# Patient Record
Sex: MALE | Race: WHITE | NOT HISPANIC OR LATINO | URBAN - METROPOLITAN AREA
[De-identification: names, ages, dates, MRNs, and addresses within clinical notes are randomized per-mention and may not be internally consistent; named-entity substitution may affect disease eponyms.]

---

## 2022-07-01 RX ADMIN — OXYCODONE HYDROCHLORIDE 10 MILLIGRAM(S): 5 TABLET ORAL at 22:20

## 2022-07-21 ENCOUNTER — INPATIENT (INPATIENT)
Facility: HOSPITAL | Age: 57
LOS: 10 days | Discharge: HOME HEALTH | DRG: 235 | End: 2022-08-01
Attending: THORACIC SURGERY (CARDIOTHORACIC VASCULAR SURGERY) | Admitting: THORACIC SURGERY (CARDIOTHORACIC VASCULAR SURGERY)
Payer: COMMERCIAL

## 2022-07-21 ENCOUNTER — TRANSCRIPTION ENCOUNTER (OUTPATIENT)
Age: 57
End: 2022-07-21

## 2022-07-21 VITALS — WEIGHT: 245.15 LBS

## 2022-07-21 LAB
A1C WITH ESTIMATED AVERAGE GLUCOSE RESULT: 5.1 % — SIGNIFICANT CHANGE UP (ref 4–5.6)
ALBUMIN SERPL ELPH-MCNC: 4.8 G/DL — SIGNIFICANT CHANGE UP (ref 3.3–5)
ALP SERPL-CCNC: 70 U/L — SIGNIFICANT CHANGE UP (ref 40–120)
ALT FLD-CCNC: 26 U/L — SIGNIFICANT CHANGE UP (ref 10–45)
ANION GAP SERPL CALC-SCNC: 10 MMOL/L — SIGNIFICANT CHANGE UP (ref 5–17)
APTT BLD: 35.8 SEC — HIGH (ref 27.5–35.5)
APTT BLD: 58.7 SEC — HIGH (ref 27.5–35.5)
AST SERPL-CCNC: 42 U/L — HIGH (ref 10–40)
BILIRUB SERPL-MCNC: 0.9 MG/DL — SIGNIFICANT CHANGE UP (ref 0.2–1.2)
BLD GP AB SCN SERPL QL: NEGATIVE — SIGNIFICANT CHANGE UP
BLD GP AB SCN SERPL QL: NEGATIVE — SIGNIFICANT CHANGE UP
BUN SERPL-MCNC: 15 MG/DL — SIGNIFICANT CHANGE UP (ref 7–23)
CALCIUM SERPL-MCNC: 9.3 MG/DL — SIGNIFICANT CHANGE UP (ref 8.4–10.5)
CHLORIDE SERPL-SCNC: 98 MMOL/L — SIGNIFICANT CHANGE UP (ref 96–108)
CHOLEST SERPL-MCNC: 155 MG/DL — SIGNIFICANT CHANGE UP
CO2 SERPL-SCNC: 29 MMOL/L — SIGNIFICANT CHANGE UP (ref 22–31)
CREAT SERPL-MCNC: 1.07 MG/DL — SIGNIFICANT CHANGE UP (ref 0.5–1.3)
EGFR: 82 ML/MIN/1.73M2 — SIGNIFICANT CHANGE UP
ESTIMATED AVERAGE GLUCOSE: 100 MG/DL — SIGNIFICANT CHANGE UP (ref 68–114)
GLUCOSE SERPL-MCNC: 118 MG/DL — HIGH (ref 70–99)
HCT VFR BLD CALC: 41.7 % — SIGNIFICANT CHANGE UP (ref 39–50)
HDLC SERPL-MCNC: 48 MG/DL — SIGNIFICANT CHANGE UP
HGB BLD-MCNC: 14.6 G/DL — SIGNIFICANT CHANGE UP (ref 13–17)
INR BLD: 1.31 — HIGH (ref 0.88–1.16)
INR BLD: 1.33 — HIGH (ref 0.88–1.16)
LIPID PNL WITH DIRECT LDL SERPL: 89 MG/DL — SIGNIFICANT CHANGE UP
MAGNESIUM SERPL-MCNC: 2.2 MG/DL — SIGNIFICANT CHANGE UP (ref 1.6–2.6)
MCHC RBC-ENTMCNC: 28.9 PG — SIGNIFICANT CHANGE UP (ref 27–34)
MCHC RBC-ENTMCNC: 35 GM/DL — SIGNIFICANT CHANGE UP (ref 32–36)
MCV RBC AUTO: 82.6 FL — SIGNIFICANT CHANGE UP (ref 80–100)
NON HDL CHOLESTEROL: 107 MG/DL — SIGNIFICANT CHANGE UP
NRBC # BLD: 0 /100 WBCS — SIGNIFICANT CHANGE UP (ref 0–0)
NT-PROBNP SERPL-SCNC: 1165 PG/ML — HIGH (ref 0–300)
PA ADP PRP-ACNC: 205 PRU — SIGNIFICANT CHANGE UP (ref 194–418)
PLATELET # BLD AUTO: 284 K/UL — SIGNIFICANT CHANGE UP (ref 150–400)
POTASSIUM SERPL-MCNC: 4.3 MMOL/L — SIGNIFICANT CHANGE UP (ref 3.5–5.3)
POTASSIUM SERPL-SCNC: 4.3 MMOL/L — SIGNIFICANT CHANGE UP (ref 3.5–5.3)
PROT SERPL-MCNC: 7.8 G/DL — SIGNIFICANT CHANGE UP (ref 6–8.3)
PROTHROM AB SERPL-ACNC: 15.6 SEC — HIGH (ref 10.5–13.4)
PROTHROM AB SERPL-ACNC: 15.9 SEC — HIGH (ref 10.5–13.4)
RBC # BLD: 5.05 M/UL — SIGNIFICANT CHANGE UP (ref 4.2–5.8)
RBC # FLD: 14.3 % — SIGNIFICANT CHANGE UP (ref 10.3–14.5)
RH IG SCN BLD-IMP: NEGATIVE — SIGNIFICANT CHANGE UP
RH IG SCN BLD-IMP: NEGATIVE — SIGNIFICANT CHANGE UP
SODIUM SERPL-SCNC: 137 MMOL/L — SIGNIFICANT CHANGE UP (ref 135–145)
TRIGL SERPL-MCNC: 91 MG/DL — SIGNIFICANT CHANGE UP
TROPONIN T SERPL-MCNC: 2.3 NG/ML — CRITICAL HIGH (ref 0–0.01)
TSH SERPL-MCNC: 3.67 UIU/ML — SIGNIFICANT CHANGE UP (ref 0.27–4.2)
WBC # BLD: 14.12 K/UL — HIGH (ref 3.8–10.5)
WBC # FLD AUTO: 14.12 K/UL — HIGH (ref 3.8–10.5)

## 2022-07-21 PROCEDURE — 71250 CT THORAX DX C-: CPT | Mod: 26

## 2022-07-21 PROCEDURE — 71045 X-RAY EXAM CHEST 1 VIEW: CPT | Mod: 26

## 2022-07-21 PROCEDURE — 93010 ELECTROCARDIOGRAM REPORT: CPT | Mod: 77

## 2022-07-21 PROCEDURE — 93306 TTE W/DOPPLER COMPLETE: CPT | Mod: 26

## 2022-07-21 PROCEDURE — 93880 EXTRACRANIAL BILAT STUDY: CPT | Mod: 26

## 2022-07-21 PROCEDURE — 93926 LOWER EXTREMITY STUDY: CPT | Mod: 26,RT

## 2022-07-21 PROCEDURE — 99222 1ST HOSP IP/OBS MODERATE 55: CPT

## 2022-07-21 RX ORDER — ATORVASTATIN CALCIUM 80 MG/1
40 TABLET, FILM COATED ORAL AT BEDTIME
Refills: 0 | Status: DISCONTINUED | OUTPATIENT
Start: 2022-07-21 | End: 2022-08-01

## 2022-07-21 RX ORDER — HEPARIN SODIUM 5000 [USP'U]/ML
1100 INJECTION INTRAVENOUS; SUBCUTANEOUS
Qty: 25000 | Refills: 0 | Status: DISCONTINUED | OUTPATIENT
Start: 2022-07-21 | End: 2022-07-21

## 2022-07-21 RX ORDER — ASPIRIN/CALCIUM CARB/MAGNESIUM 324 MG
81 TABLET ORAL DAILY
Refills: 0 | Status: DISCONTINUED | OUTPATIENT
Start: 2022-07-21 | End: 2022-08-01

## 2022-07-21 RX ORDER — POLYETHYLENE GLYCOL 3350 17 G/17G
17 POWDER, FOR SOLUTION ORAL DAILY
Refills: 0 | Status: DISCONTINUED | OUTPATIENT
Start: 2022-07-21 | End: 2022-07-25

## 2022-07-21 RX ORDER — ACETAMINOPHEN 500 MG
650 TABLET ORAL EVERY 6 HOURS
Refills: 0 | Status: DISCONTINUED | OUTPATIENT
Start: 2022-07-21 | End: 2022-07-25

## 2022-07-21 RX ORDER — HEPARIN SODIUM 5000 [USP'U]/ML
5000 INJECTION INTRAVENOUS; SUBCUTANEOUS EVERY 8 HOURS
Refills: 0 | Status: DISCONTINUED | OUTPATIENT
Start: 2022-07-21 | End: 2022-07-21

## 2022-07-21 RX ORDER — PANTOPRAZOLE SODIUM 20 MG/1
40 TABLET, DELAYED RELEASE ORAL
Refills: 0 | Status: DISCONTINUED | OUTPATIENT
Start: 2022-07-21 | End: 2022-07-25

## 2022-07-21 RX ORDER — SODIUM CHLORIDE 9 MG/ML
3 INJECTION INTRAMUSCULAR; INTRAVENOUS; SUBCUTANEOUS EVERY 8 HOURS
Refills: 0 | Status: DISCONTINUED | OUTPATIENT
Start: 2022-07-21 | End: 2022-07-25

## 2022-07-21 RX ORDER — METOPROLOL TARTRATE 50 MG
12.5 TABLET ORAL EVERY 12 HOURS
Refills: 0 | Status: DISCONTINUED | OUTPATIENT
Start: 2022-07-21 | End: 2022-07-25

## 2022-07-21 RX ORDER — ISOSORBIDE MONONITRATE 60 MG/1
30 TABLET, EXTENDED RELEASE ORAL DAILY
Refills: 0 | Status: DISCONTINUED | OUTPATIENT
Start: 2022-07-22 | End: 2022-07-25

## 2022-07-21 RX ORDER — ENOXAPARIN SODIUM 100 MG/ML
110 INJECTION SUBCUTANEOUS EVERY 12 HOURS
Refills: 0 | Status: DISCONTINUED | OUTPATIENT
Start: 2022-07-21 | End: 2022-07-21

## 2022-07-21 RX ADMIN — ENOXAPARIN SODIUM 110 MILLIGRAM(S): 100 INJECTION SUBCUTANEOUS at 17:47

## 2022-07-21 RX ADMIN — SODIUM CHLORIDE 3 MILLILITER(S): 9 INJECTION INTRAMUSCULAR; INTRAVENOUS; SUBCUTANEOUS at 21:50

## 2022-07-21 RX ADMIN — PANTOPRAZOLE SODIUM 40 MILLIGRAM(S): 20 TABLET, DELAYED RELEASE ORAL at 13:19

## 2022-07-21 RX ADMIN — POLYETHYLENE GLYCOL 3350 17 GRAM(S): 17 POWDER, FOR SOLUTION ORAL at 13:19

## 2022-07-21 RX ADMIN — Medication 81 MILLIGRAM(S): at 13:19

## 2022-07-21 RX ADMIN — Medication 12.5 MILLIGRAM(S): at 13:19

## 2022-07-21 RX ADMIN — SODIUM CHLORIDE 3 MILLILITER(S): 9 INJECTION INTRAMUSCULAR; INTRAVENOUS; SUBCUTANEOUS at 13:12

## 2022-07-21 RX ADMIN — HEPARIN SODIUM 11 UNIT(S)/HR: 5000 INJECTION INTRAVENOUS; SUBCUTANEOUS at 13:20

## 2022-07-21 RX ADMIN — ATORVASTATIN CALCIUM 40 MILLIGRAM(S): 80 TABLET, FILM COATED ORAL at 21:48

## 2022-07-21 RX ADMIN — Medication 12.5 MILLIGRAM(S): at 17:47

## 2022-07-21 NOTE — H&P ADULT - NSHPSOCIALHISTORY_GEN_ALL_CORE
Social History  Smoker:   YES / NO       Pack Years:       When Quit:  ETOH Use:   YES / NO   Frequency / Quantity:  Ilicit Drug Use:   YES / NO  Occupation:  Assistant Devices:   None / Walker / Cane  Lives with: Social History  Smoker:   NO  ETOH Use:   occasional, social   Ilicit Drug Use:  NO  Occupation: retired    Assistant Devices:   None  Lives with: wife and kids

## 2022-07-21 NOTE — CONSULT NOTE ADULT - ASSESSMENT
56 y/o M transferred for 3-vessel CABG next week after cardiac cath and IABP placement via R groin now with R groin hematoma and pseudoaneurysm    - Ultrasound guided compression performed at bedside for 30 minutes.  - The pseudoaneurysm was mainly without flow at the conclusion of compression with some small areas of residual flow  - Strict bedrest for until the am, must lay flat  - repeat U/S of R groin first thing in the am  - NPO @ [facility]  - May need thrombin injection tomorrow if residual pseudoaneurysm seen  - CT surgery ok with holding anticoagulation for now 58 y/o M transferred for 3-vessel CABG next week after cardiac cath and IABP placement via R groin now with R groin hematoma and pseudoaneurysm    - Ultrasound guided compression performed at bedside for 30 minutes.  - The pseudoaneurysm was mainly without flow at the conclusion of compression with some small areas of residual flow  - Strict bedrest for until the am, must lay flat  - f/u CT scan  - repeat U/S of R groin first thing in the am  - NPO @ [facility]  - May need thrombin injection tomorrow if residual pseudoaneurysm seen  - CT surgery ok with holding anticoagulation for now

## 2022-07-21 NOTE — PATIENT PROFILE ADULT - FALL HARM RISK - UNIVERSAL INTERVENTIONS
Bed in lowest position, wheels locked, appropriate side rails in place/Call bell, personal items and telephone in reach/Instruct patient to call for assistance before getting out of bed or chair/Non-slip footwear when patient is out of bed/[place] to call system/Physically safe environment - no spills, clutter or unnecessary equipment/Purposeful Proactive Rounding/Room/bathroom lighting operational, light cord in reach

## 2022-07-21 NOTE — H&P ADULT - HISTORY OF PRESENT ILLNESS
57 y.o M with no significant PMHx, recent COVID infection 3 weeks ago, who initially presented to [facility] ED on 7/19 with exertional chest pain X1 week, relieved with rest. In the ED patient noted to have elevated troponin and was ruled in for NSTEMI. He was given aspirin and brilinta and taken to the cath lab. Cath revealed 3VCAD: LAD 90%, D1 80%, LCx 90%, OM1 100% thrombotic occlusion with collaterals from RCA, RPDA 80%. IABP was placed and patient was brought to the ICU post cath. IABP was removed the next day and decision made to transfer to [facility] under the care of Dr. Bran for surgical evaluation.

## 2022-07-21 NOTE — H&P ADULT - NSHPREVIEWOFSYSTEMS_GEN_ALL_CORE
Review of Systems  CONSTITUTIONAL:  Denies Fevers / chills, sweats, fatigue, weight loss, weight gain                                      NEURO:  Denies paresthesias, seizures, syncope, confusion                                                                                EYES:  Denies Blurry vision, discharge, pain, loss of vision                                                                                    ENMT:  Denies Difficulty hearing, vertigo, dysphagia, epistaxis, recent dental work                                       CV:  Denies Chest pain, palpitations, BEYER, orthopnea                                                                                          RESPIRATORY:  Denies Wheezing, SOB, cough / sputum, hemoptysis                                                                GI:  Denies Nausea, vomiting, diarrhea, constipation, melena, difficulty swallowing                                               : Denies Hematuria, dysuria, urgency, incontinence                                                                                         MUSKULOSKELETAL:  Denies arthritis, joint swelling, muscle weakness                                                             SKIN/BREAST:  Denies rash, itching, hair loss, masses                                                                                            PSYCH:  Denies depression, anxiety, suicidal ideation                                                                                               HEME/LYMPH:  Denies bruises easily, enlarged lymph nodes, tender lymph nodes                                        ENDOCRINE:  Denies cold intolerance, heat intolerance, polydipsia

## 2022-07-21 NOTE — CONSULT NOTE ADULT - ATTENDING COMMENTS
Patient with right groin pseudoaneurysm following IABP.  Will proceed with thrombin injection today.  Patient agreeable to proceed.

## 2022-07-21 NOTE — H&P ADULT - NSHPPHYSICALEXAM_GEN_ALL_CORE
Physical Exam  CONSTITUTIONAL:                                                              WNL  NEURO:                                                                       WNL                      EYES:                                                                                WNL  ENMT:                                                                               WNL  CV:                                                                                   WNL  RESPIRATORY:                                                                 WNL  GI:                                                                                     WNL  : MCNAMARA + / -                                                                  WNL  MUSKULOSKELETAL:                                                       WNL  SKIN / BREAST:                                                                  WNL Physical Exam  CONSTITUTIONAL: well appearing, NAD  NEURO: A&OX3, no focal deficits noted                     EYES: PERRLA   ENMT: neck supple    CV: RRR, no m/r/g   RESPIRATORY:  CTA bilateral posterior lung fields, no wheezes, rales, rhonchi   GI: +BS, NT/ND  : No johnson   MUSKULOSKELETAL: No peripheral edema or calf tenderness   SKIN / BREAST: No rashes noted

## 2022-07-22 PROBLEM — Z00.00 ENCOUNTER FOR PREVENTIVE HEALTH EXAMINATION: Status: ACTIVE | Noted: 2022-07-22

## 2022-07-22 LAB
ANION GAP SERPL CALC-SCNC: 11 MMOL/L — SIGNIFICANT CHANGE UP (ref 5–17)
APTT BLD: 30.7 SEC — SIGNIFICANT CHANGE UP (ref 27.5–35.5)
BUN SERPL-MCNC: 15 MG/DL — SIGNIFICANT CHANGE UP (ref 7–23)
CALCIUM SERPL-MCNC: 8.8 MG/DL — SIGNIFICANT CHANGE UP (ref 8.4–10.5)
CHLORIDE SERPL-SCNC: 101 MMOL/L — SIGNIFICANT CHANGE UP (ref 96–108)
CO2 SERPL-SCNC: 26 MMOL/L — SIGNIFICANT CHANGE UP (ref 22–31)
CREAT SERPL-MCNC: 1.1 MG/DL — SIGNIFICANT CHANGE UP (ref 0.5–1.3)
EGFR: 79 ML/MIN/1.73M2 — SIGNIFICANT CHANGE UP
GLUCOSE SERPL-MCNC: 100 MG/DL — HIGH (ref 70–99)
HCT VFR BLD CALC: 39.1 % — SIGNIFICANT CHANGE UP (ref 39–50)
HCT VFR BLD CALC: 39.2 % — SIGNIFICANT CHANGE UP (ref 39–50)
HGB BLD-MCNC: 13.5 G/DL — SIGNIFICANT CHANGE UP (ref 13–17)
HGB BLD-MCNC: 13.5 G/DL — SIGNIFICANT CHANGE UP (ref 13–17)
INR BLD: 1.29 — HIGH (ref 0.88–1.16)
MAGNESIUM SERPL-MCNC: 2.1 MG/DL — SIGNIFICANT CHANGE UP (ref 1.6–2.6)
MCHC RBC-ENTMCNC: 28.5 PG — SIGNIFICANT CHANGE UP (ref 27–34)
MCHC RBC-ENTMCNC: 28.8 PG — SIGNIFICANT CHANGE UP (ref 27–34)
MCHC RBC-ENTMCNC: 34.4 GM/DL — SIGNIFICANT CHANGE UP (ref 32–36)
MCHC RBC-ENTMCNC: 34.5 GM/DL — SIGNIFICANT CHANGE UP (ref 32–36)
MCV RBC AUTO: 82.9 FL — SIGNIFICANT CHANGE UP (ref 80–100)
MCV RBC AUTO: 83.4 FL — SIGNIFICANT CHANGE UP (ref 80–100)
NRBC # BLD: 0 /100 WBCS — SIGNIFICANT CHANGE UP (ref 0–0)
NRBC # BLD: 0 /100 WBCS — SIGNIFICANT CHANGE UP (ref 0–0)
PA ADP PRP-ACNC: 222 PRU — SIGNIFICANT CHANGE UP (ref 194–418)
PLATELET # BLD AUTO: 226 K/UL — SIGNIFICANT CHANGE UP (ref 150–400)
PLATELET # BLD AUTO: 295 K/UL — SIGNIFICANT CHANGE UP (ref 150–400)
POTASSIUM SERPL-MCNC: 4.1 MMOL/L — SIGNIFICANT CHANGE UP (ref 3.5–5.3)
POTASSIUM SERPL-SCNC: 4.1 MMOL/L — SIGNIFICANT CHANGE UP (ref 3.5–5.3)
PROTHROM AB SERPL-ACNC: 15.4 SEC — HIGH (ref 10.5–13.4)
RBC # BLD: 4.69 M/UL — SIGNIFICANT CHANGE UP (ref 4.2–5.8)
RBC # BLD: 4.73 M/UL — SIGNIFICANT CHANGE UP (ref 4.2–5.8)
RBC # FLD: 14 % — SIGNIFICANT CHANGE UP (ref 10.3–14.5)
RBC # FLD: 14.1 % — SIGNIFICANT CHANGE UP (ref 10.3–14.5)
SODIUM SERPL-SCNC: 138 MMOL/L — SIGNIFICANT CHANGE UP (ref 135–145)
WBC # BLD: 11.55 K/UL — HIGH (ref 3.8–10.5)
WBC # BLD: 13.01 K/UL — HIGH (ref 3.8–10.5)
WBC # FLD AUTO: 11.55 K/UL — HIGH (ref 3.8–10.5)
WBC # FLD AUTO: 13.01 K/UL — HIGH (ref 3.8–10.5)

## 2022-07-22 PROCEDURE — 36002 PSEUDOANEURYSM INJECTION TRT: CPT | Mod: GC

## 2022-07-22 PROCEDURE — 94010 BREATHING CAPACITY TEST: CPT | Mod: 26

## 2022-07-22 RX ORDER — ENOXAPARIN SODIUM 100 MG/ML
110 INJECTION SUBCUTANEOUS EVERY 12 HOURS
Refills: 0 | Status: DISCONTINUED | OUTPATIENT
Start: 2022-07-22 | End: 2022-07-23

## 2022-07-22 RX ORDER — POTASSIUM CHLORIDE 20 MEQ
10 PACKET (EA) ORAL ONCE
Refills: 0 | Status: COMPLETED | OUTPATIENT
Start: 2022-07-22 | End: 2022-07-22

## 2022-07-22 RX ADMIN — SODIUM CHLORIDE 3 MILLILITER(S): 9 INJECTION INTRAMUSCULAR; INTRAVENOUS; SUBCUTANEOUS at 22:07

## 2022-07-22 RX ADMIN — SODIUM CHLORIDE 3 MILLILITER(S): 9 INJECTION INTRAMUSCULAR; INTRAVENOUS; SUBCUTANEOUS at 06:20

## 2022-07-22 RX ADMIN — ISOSORBIDE MONONITRATE 30 MILLIGRAM(S): 60 TABLET, EXTENDED RELEASE ORAL at 11:17

## 2022-07-22 RX ADMIN — Medication 81 MILLIGRAM(S): at 11:17

## 2022-07-22 RX ADMIN — ENOXAPARIN SODIUM 110 MILLIGRAM(S): 100 INJECTION SUBCUTANEOUS at 11:44

## 2022-07-22 RX ADMIN — ATORVASTATIN CALCIUM 40 MILLIGRAM(S): 80 TABLET, FILM COATED ORAL at 22:06

## 2022-07-22 RX ADMIN — SODIUM CHLORIDE 3 MILLILITER(S): 9 INJECTION INTRAMUSCULAR; INTRAVENOUS; SUBCUTANEOUS at 13:45

## 2022-07-22 RX ADMIN — POLYETHYLENE GLYCOL 3350 17 GRAM(S): 17 POWDER, FOR SOLUTION ORAL at 11:17

## 2022-07-22 RX ADMIN — Medication 10 MILLIEQUIVALENT(S): at 09:40

## 2022-07-22 RX ADMIN — ENOXAPARIN SODIUM 110 MILLIGRAM(S): 100 INJECTION SUBCUTANEOUS at 22:06

## 2022-07-22 RX ADMIN — PANTOPRAZOLE SODIUM 40 MILLIGRAM(S): 20 TABLET, DELAYED RELEASE ORAL at 06:18

## 2022-07-22 RX ADMIN — Medication 12.5 MILLIGRAM(S): at 06:18

## 2022-07-22 RX ADMIN — Medication 12.5 MILLIGRAM(S): at 17:05

## 2022-07-22 NOTE — PROGRESS NOTE ADULT - ASSESSMENT
57 y.o M with no significant PMHx, recent COVID infection 3 weeks ago, who initially presented to [facility] ED on 7/19 with exertional chest pain X1 week, relieved with rest. In the ED patient noted to have elevated troponin and was ruled in for NSTEMI. He was given aspirin and brilinta and taken to the cath lab. Cath revealed 3VCAD: LAD 90%, D1 80%, LCx 90%, OM1 100% thrombotic occlusion with collaterals from RCA, RPDA 80%. IABP was placed and patient was brought to the ICU post cath. IABP was removed the next day and decision made to transfer to [facility] under the care of Dr. Bran for surgical evaluation.     Plan:  Problem 1: 3VCAD  - Patient transferred for surgical evaluation, pending pre op testing: labs, carotid U/S, TTE, bedside PFTs  - F/u P2Y12, per chart given brillinta at [facility]  - Continue heparin gtt, BB, atorvastatin, ASA     Problem 2: pseudoaneurysm  - vascular surgery consulted   - injected right groin with thrombin today  - repeat formal u/s tomorrow to evaluate changes

## 2022-07-22 NOTE — PROGRESS NOTE ADULT - SUBJECTIVE AND OBJECTIVE BOX
Patient discussed on morning rounds with Dr. Bran    Operation / Date: preop CABG    SUBJECTIVE ASSESSMENT:  57y Male . NAEO. Pseudoaneurysm being injected by vascular surgery this morning. Patient to remain lying flat 4 hours post procedure and then bed rest all day. PAtient denies cp, sob, palpitaitons, n/v/d/c.    Vital Signs Last 24 Hrs  T(C): 36.2 (22 Jul 2022 09:00), Max: 36.6 (21 Jul 2022 20:33)  T(F): 97.1 (22 Jul 2022 09:00), Max: 97.8 (21 Jul 2022 20:33)  HR: 80 (22 Jul 2022 16:00) (64 - 80)  BP: 124/71 (22 Jul 2022 16:00) (111/57 - 131/66)  BP(mean): 89 (22 Jul 2022 16:00) (75 - 94)  RR: 18 (22 Jul 2022 16:00) (16 - 18)  SpO2: 96% (22 Jul 2022 16:00) (95% - 97%)    Parameters below as of 22 Jul 2022 16:00  Patient On (Oxygen Delivery Method): room air      I&O's Detail    21 Jul 2022 07:01  -  22 Jul 2022 07:00  --------------------------------------------------------  IN:    Oral Fluid: 120 mL  Total IN: 120 mL    OUT:    Voided (mL): 400 mL  Total OUT: 400 mL    Total NET: -280 mL    CHEST TUBE:   No.   ZEENAT DRAIN:  No.  EPICARDIAL WIRES: No.  TIE DOWNS: No.  MCNAMARA: No.    PHYSICAL EXAM:  GEN: NAD, looks comfortable  Psych: Mood appropriate  Neuro: A&Ox3.  No focal deficits.  Moving all extremities.   HEENT: No obvious abnormalities  CV: S1S2, regular, no murmurs appreciated.  No carotid bruits.  No JVD  Lungs: Clear B/L.  No wheezing, rales or rhonchi  ABD: Soft, non-tender, non-distended.  +Bowel sounds  EXT: Warm and well perfused.  No peripheral edema noted  Musculoskeletal: Moving all extremities with normal ROM, no joint swelling  PV: Pedal pulses palpable    LABS:                        13.5   11.55 )-----------( 226      ( 22 Jul 2022 05:30 )             39.1       COUMADIN: No.     PT/INR - ( 22 Jul 2022 05:30 )   PT: 15.4 sec;   INR: 1.29          PTT - ( 22 Jul 2022 05:30 )  PTT:30.7 sec    07-22    138  |  101  |  15  ----------------------------<  100<H>  4.1   |  26  |  1.10    Ca    8.8      22 Jul 2022 05:30  Mg     2.1     07-22    TPro  7.8  /  Alb  4.8  /  TBili  0.9  /  DBili  x   /  AST  42<H>  /  ALT  26  /  AlkPhos  70  07-21    MEDICATIONS  (STANDING):  aspirin  chewable 81 milliGRAM(s) Oral daily  atorvastatin 40 milliGRAM(s) Oral at bedtime  enoxaparin Injectable 110 milliGRAM(s) SubCutaneous every 12 hours  isosorbide   mononitrate ER Tablet (IMDUR) 30 milliGRAM(s) Oral daily  metoprolol tartrate 12.5 milliGRAM(s) Oral every 12 hours  pantoprazole    Tablet 40 milliGRAM(s) Oral before breakfast  polyethylene glycol 3350 17 Gram(s) Oral daily  sodium chloride 0.9% lock flush 3 milliLiter(s) IV Push every 8 hours    MEDICATIONS  (PRN):  acetaminophen     Tablet .. 650 milliGRAM(s) Oral every 6 hours PRN Mild Pain (1 - 3)      RADIOLOGY & ADDITIONAL TESTS:  < from: US Duplex Carotid Arteries Complete, Bilateral (07.21.22 @ 18:37) >  Mild partially calcified plaque right proximal internal carotid artery.   Moderate amount of partially calcified plaqueleft proximal internal   carotid artery.    < end of copied text >    < from: VA Duplex Low Ext Arterial, Ltd, Right (07.21.22 @ 17:58) >  FINDINGS:  There is a right common femoral artery pseudoaneurysm with swirling blood   flow measuring 2.9 x 1.7 x 3.0 cm. Surrounding hematoma measures 3.5 x   1.9 x 4.1 cm. The pseudoaneurysm neck measures 1.1 cm in length and 0.4   cm in width. No deep vein thrombosis of the right thigh.    IMPRESSION:  1.  Right groin pseudoaneurysm as detailed above.  2.  No right thigh deep vein thrombosis.    < end of copied text >

## 2022-07-22 NOTE — PROCEDURE NOTE - ADDITIONAL PROCEDURE DETAILS
Please have patient lie flat for 4 hours and remain on bedrest  Will repeat duplex ultrasound tomorrow morning to evaluate pseudoaneurysm

## 2022-07-22 NOTE — PROCEDURE NOTE - GENERAL PROCEDURE DETAILS
5000 u thrombin injected into R femoral pseudoaneurysm using ultrasound guidance. patient tolerated procedure well. 30 minutes of compression held afterwards. There was no flow in the pseudoaneurysm sac on completion of procedure and patient had a palpable dp and pt

## 2022-07-23 LAB
ALBUMIN SERPL ELPH-MCNC: 3.9 G/DL — SIGNIFICANT CHANGE UP (ref 3.3–5)
ALP SERPL-CCNC: 67 U/L — SIGNIFICANT CHANGE UP (ref 40–120)
ALT FLD-CCNC: 19 U/L — SIGNIFICANT CHANGE UP (ref 10–45)
ANION GAP SERPL CALC-SCNC: 11 MMOL/L — SIGNIFICANT CHANGE UP (ref 5–17)
APPEARANCE UR: CLEAR — SIGNIFICANT CHANGE UP
APTT BLD: 35.8 SEC — HIGH (ref 27.5–35.5)
AST SERPL-CCNC: 19 U/L — SIGNIFICANT CHANGE UP (ref 10–40)
BILIRUB SERPL-MCNC: 0.7 MG/DL — SIGNIFICANT CHANGE UP (ref 0.2–1.2)
BILIRUB UR-MCNC: NEGATIVE — SIGNIFICANT CHANGE UP
BUN SERPL-MCNC: 17 MG/DL — SIGNIFICANT CHANGE UP (ref 7–23)
CALCIUM SERPL-MCNC: 9.2 MG/DL — SIGNIFICANT CHANGE UP (ref 8.4–10.5)
CHLORIDE SERPL-SCNC: 101 MMOL/L — SIGNIFICANT CHANGE UP (ref 96–108)
CO2 SERPL-SCNC: 26 MMOL/L — SIGNIFICANT CHANGE UP (ref 22–31)
COLOR SPEC: YELLOW — SIGNIFICANT CHANGE UP
CREAT SERPL-MCNC: 1.05 MG/DL — SIGNIFICANT CHANGE UP (ref 0.5–1.3)
DIFF PNL FLD: NEGATIVE — SIGNIFICANT CHANGE UP
EGFR: 84 ML/MIN/1.73M2 — SIGNIFICANT CHANGE UP
GLUCOSE SERPL-MCNC: 109 MG/DL — HIGH (ref 70–99)
GLUCOSE UR QL: NEGATIVE — SIGNIFICANT CHANGE UP
HCT VFR BLD CALC: 37.5 % — LOW (ref 39–50)
HGB BLD-MCNC: 12.8 G/DL — LOW (ref 13–17)
KETONES UR-MCNC: NEGATIVE — SIGNIFICANT CHANGE UP
LEUKOCYTE ESTERASE UR-ACNC: NEGATIVE — SIGNIFICANT CHANGE UP
MAGNESIUM SERPL-MCNC: 2.2 MG/DL — SIGNIFICANT CHANGE UP (ref 1.6–2.6)
MCHC RBC-ENTMCNC: 28.7 PG — SIGNIFICANT CHANGE UP (ref 27–34)
MCHC RBC-ENTMCNC: 34.1 GM/DL — SIGNIFICANT CHANGE UP (ref 32–36)
MCV RBC AUTO: 84.1 FL — SIGNIFICANT CHANGE UP (ref 80–100)
NITRITE UR-MCNC: NEGATIVE — SIGNIFICANT CHANGE UP
NRBC # BLD: 0 /100 WBCS — SIGNIFICANT CHANGE UP (ref 0–0)
PH UR: 5.5 — SIGNIFICANT CHANGE UP (ref 5–8)
PLATELET # BLD AUTO: 260 K/UL — SIGNIFICANT CHANGE UP (ref 150–400)
POTASSIUM SERPL-MCNC: 4.2 MMOL/L — SIGNIFICANT CHANGE UP (ref 3.5–5.3)
POTASSIUM SERPL-SCNC: 4.2 MMOL/L — SIGNIFICANT CHANGE UP (ref 3.5–5.3)
PROT SERPL-MCNC: 6.8 G/DL — SIGNIFICANT CHANGE UP (ref 6–8.3)
PROT UR-MCNC: NEGATIVE MG/DL — SIGNIFICANT CHANGE UP
RBC # BLD: 4.46 M/UL — SIGNIFICANT CHANGE UP (ref 4.2–5.8)
RBC # FLD: 14.1 % — SIGNIFICANT CHANGE UP (ref 10.3–14.5)
SODIUM SERPL-SCNC: 138 MMOL/L — SIGNIFICANT CHANGE UP (ref 135–145)
SP GR SPEC: 1.02 — SIGNIFICANT CHANGE UP (ref 1–1.03)
UROBILINOGEN FLD QL: 0.2 E.U./DL — SIGNIFICANT CHANGE UP
WBC # BLD: 10.25 K/UL — SIGNIFICANT CHANGE UP (ref 3.8–10.5)
WBC # FLD AUTO: 10.25 K/UL — SIGNIFICANT CHANGE UP (ref 3.8–10.5)

## 2022-07-23 PROCEDURE — 99232 SBSQ HOSP IP/OBS MODERATE 35: CPT

## 2022-07-23 PROCEDURE — 93926 LOWER EXTREMITY STUDY: CPT | Mod: 26,RT

## 2022-07-23 PROCEDURE — 71045 X-RAY EXAM CHEST 1 VIEW: CPT | Mod: 26

## 2022-07-23 RX ORDER — HEPARIN SODIUM 5000 [USP'U]/ML
1200 INJECTION INTRAVENOUS; SUBCUTANEOUS
Qty: 25000 | Refills: 0 | Status: DISCONTINUED | OUTPATIENT
Start: 2022-07-23 | End: 2022-07-24

## 2022-07-23 RX ORDER — ENOXAPARIN SODIUM 100 MG/ML
110 INJECTION SUBCUTANEOUS ONCE
Refills: 0 | Status: DISCONTINUED | OUTPATIENT
Start: 2022-07-23 | End: 2022-07-23

## 2022-07-23 RX ADMIN — PANTOPRAZOLE SODIUM 40 MILLIGRAM(S): 20 TABLET, DELAYED RELEASE ORAL at 06:09

## 2022-07-23 RX ADMIN — ATORVASTATIN CALCIUM 40 MILLIGRAM(S): 80 TABLET, FILM COATED ORAL at 22:26

## 2022-07-23 RX ADMIN — HEPARIN SODIUM 12 UNIT(S)/HR: 5000 INJECTION INTRAVENOUS; SUBCUTANEOUS at 19:09

## 2022-07-23 RX ADMIN — POLYETHYLENE GLYCOL 3350 17 GRAM(S): 17 POWDER, FOR SOLUTION ORAL at 11:07

## 2022-07-23 RX ADMIN — Medication 81 MILLIGRAM(S): at 11:06

## 2022-07-23 RX ADMIN — Medication 12.5 MILLIGRAM(S): at 17:08

## 2022-07-23 RX ADMIN — SODIUM CHLORIDE 3 MILLILITER(S): 9 INJECTION INTRAMUSCULAR; INTRAVENOUS; SUBCUTANEOUS at 13:17

## 2022-07-23 RX ADMIN — SODIUM CHLORIDE 3 MILLILITER(S): 9 INJECTION INTRAMUSCULAR; INTRAVENOUS; SUBCUTANEOUS at 21:53

## 2022-07-23 RX ADMIN — ISOSORBIDE MONONITRATE 30 MILLIGRAM(S): 60 TABLET, EXTENDED RELEASE ORAL at 11:06

## 2022-07-23 RX ADMIN — Medication 12.5 MILLIGRAM(S): at 06:10

## 2022-07-23 RX ADMIN — ENOXAPARIN SODIUM 110 MILLIGRAM(S): 100 INJECTION SUBCUTANEOUS at 10:56

## 2022-07-23 NOTE — PROGRESS NOTE ADULT - ASSESSMENT
57 y.o M with no significant PMHx, recent COVID infection 3 weeks ago, who initially presented to [facility] ED on 7/19 with exertional chest pain X1 week, relieved with rest. In the ED patient noted to have elevated troponin and was ruled in for NSTEMI. He was given aspirin and brilinta and taken to the cath lab. Cath revealed 3VCAD: LAD 90%, D1 80%, LCx 90%, OM1 100% thrombotic occlusion with collaterals from RCA, RPDA 80%. IABP was placed and patient was brought to the ICU post cath. IABP was removed the next day and decision made to transfer to [facility] under the care of Dr. Bran for surgical evaluation.     Plan:  Problem 1: 3VCAD  - Patient transferred for surgical evaluation, pending pre op testing: labs, carotid U/S, TTE, bedside PFTs  - F/u P2Y12, per chart given brillinta at [facility]  - Restarted heparin gtt, BB, atorvastatin, ASA     Problem 2: pseudoaneurysm  - vascular surgery consulted   - injected right groin with thrombin 7/22  - repeat formal u/s today to evaluate changes - results above  - vascular held u/s pressure on right groin; will repeat u/s tomorrow

## 2022-07-23 NOTE — PROGRESS NOTE ADULT - SUBJECTIVE AND OBJECTIVE BOX
S: Patient perceived an increase in the size of his groin hematoma overnight after thrombin injection  yesterday. [facility] called to bedside for pressure. Planning for CABG Monday.     O:     Vital Signs Last 24 Hrs  T(C): 36.2 (23 Jul 2022 21:15), Max: 36.9 (23 Jul 2022 16:50)  T(F): 97.2 (23 Jul 2022 21:15), Max: 98.4 (23 Jul 2022 16:50)  HR: 77 (23 Jul 2022 20:36) (71 - 80)  BP: 120/77 (23 Jul 2022 20:36) (118/65 - 148/76)  BP(mean): 94 (23 Jul 2022 20:36) (84 - 105)  RR: 15 (23 Jul 2022 20:36) (13 - 19)  SpO2: 97% (23 Jul 2022 20:36) (94% - 97%)    LABS:                        12.8   10.25 )-----------( 260      ( 23 Jul 2022 06:55 )             37.5     07-23    138  |  101  |  17  ----------------------------<  109<H>  4.2   |  26  |  1.05    Ca    9.2      23 Jul 2022 06:55  Mg     2.2     07-23    TPro  6.8  /  Alb  3.9  /  TBili  0.7  /  DBili  x   /  AST  19  /  ALT  19  /  AlkPhos  67  07-23    PT/INR - ( 22 Jul 2022 05:30 )   PT: 15.4 sec;   INR: 1.29          PTT - ( 23 Jul 2022 17:26 )  PTT:35.8 sec    Radiology and additional studies    FINDINGS:  There is again a right common femoral artery pseudoaneurysm with near   complete thrombosis. It measures 4.6 x 2.1 x 3.3 cm and probably   unchanged in size from prior imaging. Continued evidence of a   vascularized neck which may be slightly smaller from prior imaging   measuring approximately 0.7 cm. No deep vein thrombosis of the right   thigh.      IMPRESSION:    1. Right groin pseudoaneurysm with near complete thrombosis as detailed   above. Imaging surveillance to confirm resolution as clinically indicated.  2. No right thigh DVT.    Dr. Albarado discussed the above findings with SHARON Tyler on 7/23/2022 at 2:10   PM.    A/P: 58 y/o M w/ CAD s/p recent MI pending CABG Monday w/ R groin PSA following right groin access for IABP, now s/p thrombin injection with near complete thrombosis of pseudoaneurysm albeit with persistence of vascularized neck.     - Pressure applied to neck of pseudoaneurysm with ultrasound image guidance today. Please obtain repeat venous duplex tomorrow to reevaluate persistence of flow in and around pseudoaneurysm following this maneuver.   - Discussed this request with CT surgery PA on call.  - Please page with any questions or concerns.

## 2022-07-23 NOTE — PROGRESS NOTE ADULT - SUBJECTIVE AND OBJECTIVE BOX
Patient discussed on morning rounds with Dr. Bonilla     Operation / Date: PST CABG    SUBJECTIVE ASSESSMENT:  57y Male. NAEO. Patient with increase in right groin hematoma site overnight, improved after pressure was held. Patient otherwise denies cp, sob, palpitaions, n/v/d/c.    Vital Signs Last 24 Hrs  T(C): 36.9 (23 Jul 2022 16:50), Max: 36.9 (23 Jul 2022 16:50)  T(F): 98.4 (23 Jul 2022 16:50), Max: 98.4 (23 Jul 2022 16:50)  HR: 80 (23 Jul 2022 17:11) (71 - 82)  BP: 130/66 (23 Jul 2022 17:11) (118/65 - 148/76)  BP(mean): 90 (23 Jul 2022 17:11) (84 - 105)  RR: 13 (23 Jul 2022 17:11) (13 - 19)  SpO2: 97% (23 Jul 2022 17:11) (94% - 97%)    Parameters below as of 23 Jul 2022 17:11  Patient On (Oxygen Delivery Method): room air      I&O's Detail    22 Jul 2022 07:01  -  23 Jul 2022 07:00  --------------------------------------------------------  IN:    Oral Fluid: 240 mL  Total IN: 240 mL    OUT:    Voided (mL): 400 mL  Total OUT: 400 mL    Total NET: -160 mL      23 Jul 2022 07:01  -  23 Jul 2022 18:33  --------------------------------------------------------  IN:    Oral Fluid: 240 mL  Total IN: 240 mL    OUT:    Voided (mL): 600 mL  Total OUT: 600 mL    Total NET: -360 mL    CHEST TUBE:  No   ZEENAT DRAIN:  No  EPICARDIAL WIRES: No  TIE DOWNS: No  MCNAMARA: No    PHYSICAL EXAM:  GEN: NAD, looks comfortable  Psych: Mood appropriate  Neuro: A&Ox3.  No focal deficits.  Moving all extremities.   HEENT: No obvious abnormalities  CV: S1S2, regular, no murmurs appreciated.  No carotid bruits.  No JVD  Lungs: Clear B/L.  No wheezing, rales or rhonchi  ABD: Soft, non-tender, non-distended.  +Bowel sounds  EXT: +significant right groin hematoma; Warm and well perfused.  No peripheral edema noted  Musculoskeletal: Moving all extremities with normal ROM, no joint swelling  PV: Pedal pulses palpable      LABS:                        12.8   10.25 )-----------( 260      ( 23 Jul 2022 06:55 )             37.5       COUMADIN: No.     PT/INR - ( 22 Jul 2022 05:30 )   PT: 15.4 sec;   INR: 1.29          PTT - ( 23 Jul 2022 17:26 )  PTT:35.8 sec    07-23    138  |  101  |  17  ----------------------------<  109<H>  4.2   |  26  |  1.05    Ca    9.2      23 Jul 2022 06:55  Mg     2.2     07-23    TPro  6.8  /  Alb  3.9  /  TBili  0.7  /  DBili  x   /  AST  19  /  ALT  19  /  AlkPhos  67  07-23    MEDICATIONS  (STANDING):  aspirin  chewable 81 milliGRAM(s) Oral daily  atorvastatin 40 milliGRAM(s) Oral at bedtime  heparin  Infusion 1200 Unit(s)/Hr (12 mL/Hr) IV Continuous <Continuous>  isosorbide   mononitrate ER Tablet (IMDUR) 30 milliGRAM(s) Oral daily  metoprolol tartrate 12.5 milliGRAM(s) Oral every 12 hours  pantoprazole    Tablet 40 milliGRAM(s) Oral before breakfast  polyethylene glycol 3350 17 Gram(s) Oral daily  sodium chloride 0.9% lock flush 3 milliLiter(s) IV Push every 8 hours    MEDICATIONS  (PRN):  acetaminophen     Tablet .. 650 milliGRAM(s) Oral every 6 hours PRN Mild Pain (1 - 3)        RADIOLOGY & ADDITIONAL TESTS:  < from: VA Duplex Low Ext Arterial, Ltd, Right (07.23.22 @ 10:27) >    IMPRESSION:    1. Right groin pseudoaneurysm with near complete thrombosis as detailed   above. Imaging surveillance to confirm resolution as clinically indicated.  2. No right thigh DVT.    < end of copied text >

## 2022-07-24 ENCOUNTER — TRANSCRIPTION ENCOUNTER (OUTPATIENT)
Age: 57
End: 2022-07-24

## 2022-07-24 LAB
ALBUMIN SERPL ELPH-MCNC: 3.9 G/DL — SIGNIFICANT CHANGE UP (ref 3.3–5)
ALP SERPL-CCNC: 71 U/L — SIGNIFICANT CHANGE UP (ref 40–120)
ALT FLD-CCNC: 17 U/L — SIGNIFICANT CHANGE UP (ref 10–45)
ANION GAP SERPL CALC-SCNC: 13 MMOL/L — SIGNIFICANT CHANGE UP (ref 5–17)
APTT BLD: 42.9 SEC — HIGH (ref 27.5–35.5)
APTT BLD: 45.4 SEC — HIGH (ref 27.5–35.5)
APTT BLD: 49.3 SEC — HIGH (ref 27.5–35.5)
APTT BLD: 49.9 SEC — HIGH (ref 27.5–35.5)
AST SERPL-CCNC: 17 U/L — SIGNIFICANT CHANGE UP (ref 10–40)
BASOPHILS # BLD AUTO: 0.03 K/UL — SIGNIFICANT CHANGE UP (ref 0–0.2)
BASOPHILS NFR BLD AUTO: 0.3 % — SIGNIFICANT CHANGE UP (ref 0–2)
BILIRUB SERPL-MCNC: 0.9 MG/DL — SIGNIFICANT CHANGE UP (ref 0.2–1.2)
BLD GP AB SCN SERPL QL: NEGATIVE — SIGNIFICANT CHANGE UP
BUN SERPL-MCNC: 16 MG/DL — SIGNIFICANT CHANGE UP (ref 7–23)
CALCIUM SERPL-MCNC: 9.2 MG/DL — SIGNIFICANT CHANGE UP (ref 8.4–10.5)
CHLORIDE SERPL-SCNC: 101 MMOL/L — SIGNIFICANT CHANGE UP (ref 96–108)
CO2 SERPL-SCNC: 24 MMOL/L — SIGNIFICANT CHANGE UP (ref 22–31)
CREAT SERPL-MCNC: 1.08 MG/DL — SIGNIFICANT CHANGE UP (ref 0.5–1.3)
EGFR: 81 ML/MIN/1.73M2 — SIGNIFICANT CHANGE UP
EOSINOPHIL # BLD AUTO: 0.24 K/UL — SIGNIFICANT CHANGE UP (ref 0–0.5)
EOSINOPHIL NFR BLD AUTO: 2.5 % — SIGNIFICANT CHANGE UP (ref 0–6)
GLUCOSE SERPL-MCNC: 104 MG/DL — HIGH (ref 70–99)
HCT VFR BLD CALC: 37.5 % — LOW (ref 39–50)
HGB BLD-MCNC: 12.8 G/DL — LOW (ref 13–17)
IMM GRANULOCYTES NFR BLD AUTO: 0.3 % — SIGNIFICANT CHANGE UP (ref 0–1.5)
INR BLD: 1.24 — HIGH (ref 0.88–1.16)
LYMPHOCYTES # BLD AUTO: 1.89 K/UL — SIGNIFICANT CHANGE UP (ref 1–3.3)
LYMPHOCYTES # BLD AUTO: 19.4 % — SIGNIFICANT CHANGE UP (ref 13–44)
MAGNESIUM SERPL-MCNC: 2.2 MG/DL — SIGNIFICANT CHANGE UP (ref 1.6–2.6)
MCHC RBC-ENTMCNC: 28.8 PG — SIGNIFICANT CHANGE UP (ref 27–34)
MCHC RBC-ENTMCNC: 34.1 GM/DL — SIGNIFICANT CHANGE UP (ref 32–36)
MCV RBC AUTO: 84.3 FL — SIGNIFICANT CHANGE UP (ref 80–100)
MONOCYTES # BLD AUTO: 0.76 K/UL — SIGNIFICANT CHANGE UP (ref 0–0.9)
MONOCYTES NFR BLD AUTO: 7.8 % — SIGNIFICANT CHANGE UP (ref 2–14)
NEUTROPHILS # BLD AUTO: 6.8 K/UL — SIGNIFICANT CHANGE UP (ref 1.8–7.4)
NEUTROPHILS NFR BLD AUTO: 69.7 % — SIGNIFICANT CHANGE UP (ref 43–77)
NRBC # BLD: 0 /100 WBCS — SIGNIFICANT CHANGE UP (ref 0–0)
PA ADP PRP-ACNC: 283 PRU — SIGNIFICANT CHANGE UP (ref 194–418)
PLATELET # BLD AUTO: 253 K/UL — SIGNIFICANT CHANGE UP (ref 150–400)
POTASSIUM SERPL-MCNC: 4.1 MMOL/L — SIGNIFICANT CHANGE UP (ref 3.5–5.3)
POTASSIUM SERPL-SCNC: 4.1 MMOL/L — SIGNIFICANT CHANGE UP (ref 3.5–5.3)
PROT SERPL-MCNC: 7 G/DL — SIGNIFICANT CHANGE UP (ref 6–8.3)
PROTHROM AB SERPL-ACNC: 14.8 SEC — HIGH (ref 10.5–13.4)
RBC # BLD: 4.45 M/UL — SIGNIFICANT CHANGE UP (ref 4.2–5.8)
RBC # FLD: 13.9 % — SIGNIFICANT CHANGE UP (ref 10.3–14.5)
RH IG SCN BLD-IMP: NEGATIVE — SIGNIFICANT CHANGE UP
SARS-COV-2 RNA SPEC QL NAA+PROBE: SIGNIFICANT CHANGE UP
SODIUM SERPL-SCNC: 138 MMOL/L — SIGNIFICANT CHANGE UP (ref 135–145)
WBC # BLD: 9.75 K/UL — SIGNIFICANT CHANGE UP (ref 3.8–10.5)
WBC # FLD AUTO: 9.75 K/UL — SIGNIFICANT CHANGE UP (ref 3.8–10.5)

## 2022-07-24 PROCEDURE — 93926 LOWER EXTREMITY STUDY: CPT | Mod: 26,RT

## 2022-07-24 PROCEDURE — 71045 X-RAY EXAM CHEST 1 VIEW: CPT | Mod: 26

## 2022-07-24 RX ORDER — CHLORHEXIDINE GLUCONATE 213 G/1000ML
1 SOLUTION TOPICAL ONCE
Refills: 0 | Status: COMPLETED | OUTPATIENT
Start: 2022-07-24 | End: 2022-07-24

## 2022-07-24 RX ORDER — HEPARIN SODIUM 5000 [USP'U]/ML
1300 INJECTION INTRAVENOUS; SUBCUTANEOUS
Qty: 25000 | Refills: 0 | Status: DISCONTINUED | OUTPATIENT
Start: 2022-07-24 | End: 2022-07-24

## 2022-07-24 RX ORDER — CHLORHEXIDINE GLUCONATE 213 G/1000ML
1 SOLUTION TOPICAL ONCE
Refills: 0 | Status: COMPLETED | OUTPATIENT
Start: 2022-07-25 | End: 2022-07-25

## 2022-07-24 RX ORDER — POTASSIUM CHLORIDE 20 MEQ
20 PACKET (EA) ORAL ONCE
Refills: 0 | Status: COMPLETED | OUTPATIENT
Start: 2022-07-24 | End: 2022-07-24

## 2022-07-24 RX ORDER — HEPARIN SODIUM 5000 [USP'U]/ML
1400 INJECTION INTRAVENOUS; SUBCUTANEOUS
Qty: 25000 | Refills: 0 | Status: DISCONTINUED | OUTPATIENT
Start: 2022-07-24 | End: 2022-07-25

## 2022-07-24 RX ORDER — CHLORHEXIDINE GLUCONATE 213 G/1000ML
15 SOLUTION TOPICAL ONCE
Refills: 0 | Status: COMPLETED | OUTPATIENT
Start: 2022-07-24 | End: 2022-07-25

## 2022-07-24 RX ORDER — DIPHENHYDRAMINE HCL 50 MG
25 CAPSULE ORAL ONCE
Refills: 0 | Status: COMPLETED | OUTPATIENT
Start: 2022-07-24 | End: 2022-07-24

## 2022-07-24 RX ADMIN — ISOSORBIDE MONONITRATE 30 MILLIGRAM(S): 60 TABLET, EXTENDED RELEASE ORAL at 11:28

## 2022-07-24 RX ADMIN — PANTOPRAZOLE SODIUM 40 MILLIGRAM(S): 20 TABLET, DELAYED RELEASE ORAL at 06:53

## 2022-07-24 RX ADMIN — POLYETHYLENE GLYCOL 3350 17 GRAM(S): 17 POWDER, FOR SOLUTION ORAL at 11:28

## 2022-07-24 RX ADMIN — CHLORHEXIDINE GLUCONATE 1 APPLICATION(S): 213 SOLUTION TOPICAL at 20:37

## 2022-07-24 RX ADMIN — SODIUM CHLORIDE 3 MILLILITER(S): 9 INJECTION INTRAMUSCULAR; INTRAVENOUS; SUBCUTANEOUS at 13:37

## 2022-07-24 RX ADMIN — Medication 25 MILLIGRAM(S): at 18:48

## 2022-07-24 RX ADMIN — SODIUM CHLORIDE 3 MILLILITER(S): 9 INJECTION INTRAMUSCULAR; INTRAVENOUS; SUBCUTANEOUS at 21:22

## 2022-07-24 RX ADMIN — SODIUM CHLORIDE 3 MILLILITER(S): 9 INJECTION INTRAMUSCULAR; INTRAVENOUS; SUBCUTANEOUS at 06:32

## 2022-07-24 RX ADMIN — ATORVASTATIN CALCIUM 40 MILLIGRAM(S): 80 TABLET, FILM COATED ORAL at 21:33

## 2022-07-24 RX ADMIN — Medication 12.5 MILLIGRAM(S): at 06:53

## 2022-07-24 RX ADMIN — Medication 20 MILLIEQUIVALENT(S): at 09:02

## 2022-07-24 RX ADMIN — Medication 12.5 MILLIGRAM(S): at 18:48

## 2022-07-24 RX ADMIN — Medication 81 MILLIGRAM(S): at 11:28

## 2022-07-24 NOTE — PROGRESS NOTE ADULT - ASSESSMENT
57 y.o M with no significant PMHx, recent COVID infection 3 weeks ago, who initially presented to [facility] ED on 7/19 with exertional chest pain for week, relieved with rest. In the ED patient noted to have elevated troponin and was ruled in for NSTEMI. He was given aspirin and brilinta and taken to the cath lab. Cath revealed 3VCAD: LAD 90%, D1 80%, LCx 90%, OM1 100% thrombotic occlusion with collaterals from RCA, RPDA 80%. IABP was placed and patient was brought to the ICU post cath. IABP was removed the next day and decision made to transfer to [facility] under the care of Dr. Bran for surgical evaluation. Patient planned for OR tomorrow for CABG with Dr. Bran.     Plan:    OR tomorrow  Preop orders in  Blood on hold  Type and screen x2  Consent in chart  NPO after midnight    Per Vascular: possible intervention during OR tomorrow

## 2022-07-24 NOTE — PROGRESS NOTE ADULT - SUBJECTIVE AND OBJECTIVE BOX
Planned Date of Surgery:  22                                                                                                                 Surgeon: Dr. Bran    Procedure: CABG    HPI:  57 y.o M with no significant PMHx, recent COVID infection 3 weeks ago, who initially presented to [facility] ED on  with exertional chest pain for week, relieved with rest. In the ED patient noted to have elevated troponin and was ruled in for NSTEMI. He was given aspirin and brilinta and taken to the cath lab. Cath revealed 3VCAD: LAD 90%, D1 80%, LCx 90%, OM1 100% thrombotic occlusion with collaterals from RCA, RPDA 80%. IABP was placed and patient was brought to the ICU post cath. IABP was removed the next day and decision made to transfer to [facility] under the care of Dr. Bran for surgical evaluation. Patient planned for OR tomorrow for CABG with Dr. Bran.    PAST MEDICAL & SURGICAL HISTORY:    No Known Allergies    Physical Exam  T(C): 36.2 (22 @ 14:00), Max: 36.3 (22 @ 01:01)  HR: 78 (22 @ 16:00) (64 - 85)  BP: 141/79 (22 @ 16:00) (120/77 - 155/72)  RR: 17 (22 @ 16:00) (15 - 18)  SpO2: 97% (22 @ 16:00) (95% - 97%)  GEN: NAD, looks comfortable  Psych: Mood appropriate  Neuro: A&Ox3.  No focal deficits.  Moving all extremities.   HEENT: No obvious abnormalities  CV: S1S2, regular, no murmurs appreciated.  No carotid bruits.  No JVD  Lungs: Clear B/L.  No wheezing, rales or rhonchi  ABD: Soft, non-tender, non-distended.  +Bowel sounds  EXT: Warm and well perfused.  No peripheral edema noted  Musculoskeletal: Moving all extremities with normal ROM, no joint swelling  PV: Pedal pulses palpable; right groin hematoma, stable      MEDICATIONS  (STANDING):  aspirin  chewable 81 milliGRAM(s) Oral daily  atorvastatin 40 milliGRAM(s) Oral at bedtime  chlorhexidine 0.12% Liquid 15 milliLiter(s) Swish and Spit once  chlorhexidine 4% Liquid 1 Application(s) Topical once  chlorhexidine 4% Liquid 1 Application(s) Topical once  heparin  Infusion 1400 Unit(s)/Hr (14.5 mL/Hr) IV Continuous <Continuous>  isosorbide   mononitrate ER Tablet (IMDUR) 30 milliGRAM(s) Oral daily  metoprolol tartrate 12.5 milliGRAM(s) Oral every 12 hours  pantoprazole    Tablet 40 milliGRAM(s) Oral before breakfast  polyethylene glycol 3350 17 Gram(s) Oral daily  sodium chloride 0.9% lock flush 3 milliLiter(s) IV Push every 8 hours    MEDICATIONS  (PRN):  acetaminophen     Tablet .. 650 milliGRAM(s) Oral every 6 hours PRN Mild Pain (1 - 3)      On Beta Blocker? YES     Labs:                        12.8   9.75  )-----------( 253      ( 2022 05:57 )             37.5         138  |  101  |  16  ----------------------------<  104<H>  4.1   |  24  |  1.08    Ca    9.2      2022 05:57  Mg     2.2         TPro  7.0  /  Alb  3.9  /  TBili  0.9  /  DBili  x   /  AST  17  /  ALT  17  /  AlkPhos  71      PT/INR - ( 2022 05:57 )   PT: 14.8 sec;   INR: 1.24          PTT - ( 2022 18:16 )  PTT:45.4 sec  Urinalysis Basic - ( 2022 20:16 )    Color: Yellow / Appearance: Clear / S.025 / pH: x  Gluc: x / Ketone: NEGATIVE  / Bili: Negative / Urobili: 0.2 E.U./dL   Blood: x / Protein: NEGATIVE mg/dL / Nitrite: NEGATIVE   Leuk Esterase: NEGATIVE / RBC: x / WBC x   Sq Epi: x / Non Sq Epi: x / Bacteria: x      ABO Interpretation: A (22 @ 06:12)      Hgb A1C:    EKG: in chart    Duplex Right Groin: < from: VA Duplex Low Ext Arterial, Ltd, Right (22 @ 14:11) >  FINDINGS:  The right common femoral, deep femoral and proximal femoral veins are   patent and free of thrombus. There is again a 4.8 x 2.2 x 3.0 cm complex   hypoechoic cystic structure arising from the right common femoral artery   consistent with pseudoaneurysm. On, color imaging there is again near   complete thrombosis of the pseudoaneurysm similar to prior exam with   persistent vascularized neck.    Compressible right common femoral, deep femoral veins.      IMPRESSION:  Since 2022, no significant change in a right groin pseudoaneurysm   with near complete thrombosis. Persistent small vascularized neck is   demonstrated. No adjacent DVT.    < end of copied text >      CXR:  < from: Xray Chest 1 View- PORTABLE-Routine (Xray Chest 1 View- PORTABLE-Routine in AM.) (22 @ 05:46) >  Frontal examination of the chest demonstrates mild cardiomegaly. No acute   infiltrates. Discoid and/or fibrotic change left lung base. Mild   dextroscoliosis thoracic spine.    IMPRESSION: Discoid and/or fibrotic change left lung base.    < end of copied text >    CT Scans:  < from: CT Chest No Cont (22 @ 16:58) >    Findings:    Lungs and large airways: Subtle 8 mm groundglass opacity nodule right   apex. Solid micronodules right middle lobe, right lower lobe and left   upper lobe. Benign calcified micronodule left lower lobe. Discoid   atelectasis both lower lobes.    Pleura:  Trace pleural thickening or pleural effusions bilaterally.    Mediastinum and hilar regions: No thoracic lymphadenopathy.    Heart and pericardium:  Heart size is normal. No pericardial effusion.    Vessels:  Heavy coronary artery calcification. No aortic aneurysm. Small   amount of calcified plaque proximal descending aorta and suprarenal   abdominal aorta.    Chest wall and lower neck:  Normal.    Upper abdomen: Normal.    Bones: Mild degenerative changes of the spine.      Impression: 1. Mild amount of calcified plaque proximal descending aorta   and suprarenal abdominal aorta.    2. Heavy coronary artery calcification.    3. There are a few bilateral lung nodules, the largest an 8 mm   groundglass opacity nodule in the right upper lobe. Recommend follow-up   chest CT in 6-12 months to ensure stability.    < end of copied text >    Cath Report: 3VCAD: LAD 90%, D1 80%, LCx 90%, OM1 100% thrombotic occlusion with collaterals from RCA, RPDA 80%.    Echo:  < from: TTE Echo Complete w/o Contrast w/ Doppler (22 @ 15:03) >  CONCLUSIONS:     1. Normal LV cavity size.   2. Moderately reduced left ventricular systolic function.   3. Regional wall motion abnormalities consistent with ischemic heart   disease.   4. Normal right ventricular size and systolic function.   5. Normal atria.   6. No significant valvular disease.   7. No evidence of pulmonary hypertension.   8. No pericardial effusion.   9. No prior echo is available for comparison.  10. Consider repeat TTE with imaging solution such as Definity to better   enhance the endocardium and evaluate the left ventricular function, if   clinically indicated.    < end of copied text >      PFT's: in chart    Carotid Duplex:  < from: US Duplex Carotid Arteries Complete, Bilateral (22 @ 18:37) >    Addendum:    Mild partially calcified plaque of the right proximal internal carotid   artery. No elevated velocities or evidence of hemodynamically significant   stenosis of the right carotid artery..    < from: US Duplex Carotid Arteries Complete, Bilateral (22 @ 18:37) >    IMPRESSION: There is 50-69% stenosis of the proximal left internal   carotid artery. Plaque burden as detailed above.    < end of copied text >      Consult in Chart?  YES   Consent in Chart? YES   Pre-op Orders Placed? YES   Blood Prodeucts Ordered? YES    NPO ordered? YES

## 2022-07-24 NOTE — PROGRESS NOTE ADULT - SUBJECTIVE AND OBJECTIVE BOX
58 y/o M w/ CAD s/p recent MI pending CABG Monday w/ R groin PSA following right groin access for IABP, now s/p thrombin injection with near complete thrombosis of pseudoaneurysm albeit with persistence of vascularized neck. Repeat US on 7/24 unchanged.    - Plan for thrombin injection in OR tomorrow vs primary repair vs repair at later date.  - Please page with any questions or concerns.

## 2022-07-25 ENCOUNTER — RESULT REVIEW (OUTPATIENT)
Age: 57
End: 2022-07-25

## 2022-07-25 ENCOUNTER — APPOINTMENT (OUTPATIENT)
Dept: CARDIOTHORACIC SURGERY | Facility: HOSPITAL | Age: 57
End: 2022-07-25

## 2022-07-25 ENCOUNTER — TRANSCRIPTION ENCOUNTER (OUTPATIENT)
Age: 57
End: 2022-07-25

## 2022-07-25 LAB
ALBUMIN SERPL ELPH-MCNC: 3.2 G/DL — LOW (ref 3.3–5)
ALBUMIN SERPL ELPH-MCNC: 3.3 G/DL — SIGNIFICANT CHANGE UP (ref 3.3–5)
ALBUMIN SERPL ELPH-MCNC: 4.3 G/DL — SIGNIFICANT CHANGE UP (ref 3.3–5)
ALP SERPL-CCNC: 46 U/L — SIGNIFICANT CHANGE UP (ref 40–120)
ALP SERPL-CCNC: 48 U/L — SIGNIFICANT CHANGE UP (ref 40–120)
ALP SERPL-CCNC: 85 U/L — SIGNIFICANT CHANGE UP (ref 40–120)
ALT FLD-CCNC: 27 U/L — SIGNIFICANT CHANGE UP (ref 10–45)
ALT FLD-CCNC: 28 U/L — SIGNIFICANT CHANGE UP (ref 10–45)
ALT FLD-CCNC: 34 U/L — SIGNIFICANT CHANGE UP (ref 10–45)
ANION GAP SERPL CALC-SCNC: 10 MMOL/L — SIGNIFICANT CHANGE UP (ref 5–17)
ANION GAP SERPL CALC-SCNC: 12 MMOL/L — SIGNIFICANT CHANGE UP (ref 5–17)
ANION GAP SERPL CALC-SCNC: 7 MMOL/L — SIGNIFICANT CHANGE UP (ref 5–17)
ANISOCYTOSIS BLD QL: SIGNIFICANT CHANGE UP
APTT BLD: 37.1 SEC — HIGH (ref 27.5–35.5)
APTT BLD: 45.9 SEC — HIGH (ref 27.5–35.5)
APTT BLD: 47.8 SEC — HIGH (ref 27.5–35.5)
APTT BLD: 56.3 SEC — HIGH (ref 27.5–35.5)
AST SERPL-CCNC: 35 U/L — SIGNIFICANT CHANGE UP (ref 10–40)
AST SERPL-CCNC: 55 U/L — HIGH (ref 10–40)
AST SERPL-CCNC: 56 U/L — HIGH (ref 10–40)
BASOPHILS # BLD AUTO: 0 K/UL — SIGNIFICANT CHANGE UP (ref 0–0.2)
BASOPHILS NFR BLD AUTO: 0 % — SIGNIFICANT CHANGE UP (ref 0–2)
BILIRUB SERPL-MCNC: 1.1 MG/DL — SIGNIFICANT CHANGE UP (ref 0.2–1.2)
BILIRUB SERPL-MCNC: 1.2 MG/DL — SIGNIFICANT CHANGE UP (ref 0.2–1.2)
BILIRUB SERPL-MCNC: 1.3 MG/DL — HIGH (ref 0.2–1.2)
BUN SERPL-MCNC: 14 MG/DL — SIGNIFICANT CHANGE UP (ref 7–23)
BUN SERPL-MCNC: 16 MG/DL — SIGNIFICANT CHANGE UP (ref 7–23)
BUN SERPL-MCNC: 17 MG/DL — SIGNIFICANT CHANGE UP (ref 7–23)
CALCIUM SERPL-MCNC: 8.2 MG/DL — LOW (ref 8.4–10.5)
CALCIUM SERPL-MCNC: 8.7 MG/DL — SIGNIFICANT CHANGE UP (ref 8.4–10.5)
CALCIUM SERPL-MCNC: 9.4 MG/DL — SIGNIFICANT CHANGE UP (ref 8.4–10.5)
CHLORIDE SERPL-SCNC: 103 MMOL/L — SIGNIFICANT CHANGE UP (ref 96–108)
CHLORIDE SERPL-SCNC: 105 MMOL/L — SIGNIFICANT CHANGE UP (ref 96–108)
CHLORIDE SERPL-SCNC: 98 MMOL/L — SIGNIFICANT CHANGE UP (ref 96–108)
CO2 SERPL-SCNC: 24 MMOL/L — SIGNIFICANT CHANGE UP (ref 22–31)
CO2 SERPL-SCNC: 26 MMOL/L — SIGNIFICANT CHANGE UP (ref 22–31)
CO2 SERPL-SCNC: 26 MMOL/L — SIGNIFICANT CHANGE UP (ref 22–31)
CREAT SERPL-MCNC: 1.05 MG/DL — SIGNIFICANT CHANGE UP (ref 0.5–1.3)
CREAT SERPL-MCNC: 1.12 MG/DL — SIGNIFICANT CHANGE UP (ref 0.5–1.3)
CREAT SERPL-MCNC: 1.13 MG/DL — SIGNIFICANT CHANGE UP (ref 0.5–1.3)
DACRYOCYTES BLD QL SMEAR: SLIGHT — SIGNIFICANT CHANGE UP
EGFR: 77 ML/MIN/1.73M2 — SIGNIFICANT CHANGE UP
EGFR: 78 ML/MIN/1.73M2 — SIGNIFICANT CHANGE UP
EGFR: 84 ML/MIN/1.73M2 — SIGNIFICANT CHANGE UP
EOSINOPHIL # BLD AUTO: 0 K/UL — SIGNIFICANT CHANGE UP (ref 0–0.5)
EOSINOPHIL NFR BLD AUTO: 0 % — SIGNIFICANT CHANGE UP (ref 0–6)
GAS PNL BLDA: SIGNIFICANT CHANGE UP
GAS PNL BLDA: SIGNIFICANT CHANGE UP
GIANT PLATELETS BLD QL SMEAR: PRESENT — SIGNIFICANT CHANGE UP
GLUCOSE BLDC GLUCOMTR-MCNC: 113 MG/DL — HIGH (ref 70–99)
GLUCOSE BLDC GLUCOMTR-MCNC: 154 MG/DL — HIGH (ref 70–99)
GLUCOSE BLDC GLUCOMTR-MCNC: 159 MG/DL — HIGH (ref 70–99)
GLUCOSE BLDC GLUCOMTR-MCNC: 166 MG/DL — HIGH (ref 70–99)
GLUCOSE BLDC GLUCOMTR-MCNC: 168 MG/DL — HIGH (ref 70–99)
GLUCOSE SERPL-MCNC: 111 MG/DL — HIGH (ref 70–99)
GLUCOSE SERPL-MCNC: 170 MG/DL — HIGH (ref 70–99)
GLUCOSE SERPL-MCNC: 177 MG/DL — HIGH (ref 70–99)
HCT VFR BLD CALC: 24.2 % — LOW (ref 39–50)
HCT VFR BLD CALC: 24.3 % — LOW (ref 39–50)
HCT VFR BLD CALC: 39.8 % — SIGNIFICANT CHANGE UP (ref 39–50)
HGB BLD-MCNC: 13.6 G/DL — SIGNIFICANT CHANGE UP (ref 13–17)
HGB BLD-MCNC: 8.4 G/DL — LOW (ref 13–17)
HGB BLD-MCNC: 8.5 G/DL — LOW (ref 13–17)
HYPOCHROMIA BLD QL: SLIGHT — SIGNIFICANT CHANGE UP
INR BLD: 1.21 — HIGH (ref 0.88–1.16)
INR BLD: 1.47 — HIGH (ref 0.88–1.16)
INR BLD: 1.53 — HIGH (ref 0.88–1.16)
ISTAT ARTERIAL BE: 3 MMOL/L — SIGNIFICANT CHANGE UP (ref -2–3)
ISTAT ARTERIAL GLUCOSE: 156 MG/DL — HIGH (ref 70–99)
ISTAT ARTERIAL HCO3: 28 MMOL/L — HIGH (ref 22–26)
ISTAT ARTERIAL HEMATOCRIT: 23 % — LOW (ref 39–50)
ISTAT ARTERIAL HEMOGLOBIN: 7.8 G/DL — LOW (ref 13–17)
ISTAT ARTERIAL IONIZED CALCIUM: 1.14 MMOL/L — SIGNIFICANT CHANGE UP (ref 1.12–1.3)
ISTAT ARTERIAL PCO2: 43 MMHG — SIGNIFICANT CHANGE UP (ref 35–45)
ISTAT ARTERIAL PH: 7.42 — SIGNIFICANT CHANGE UP (ref 7.35–7.45)
ISTAT ARTERIAL PO2: 328 MMHG — HIGH (ref 80–105)
ISTAT ARTERIAL POTASSIUM: 4.4 MMOL/L — SIGNIFICANT CHANGE UP (ref 3.5–5.3)
ISTAT ARTERIAL SO2: 100 % — HIGH (ref 95–98)
ISTAT ARTERIAL SODIUM: 138 MMOL/L — SIGNIFICANT CHANGE UP (ref 135–145)
ISTAT ARTERIAL TCO2: 29 MMOL/L — SIGNIFICANT CHANGE UP (ref 22–31)
LACTATE SERPL-SCNC: 1.6 MMOL/L — SIGNIFICANT CHANGE UP (ref 0.5–2)
LYMPHOCYTES # BLD AUTO: 0.74 K/UL — LOW (ref 1–3.3)
LYMPHOCYTES # BLD AUTO: 4.4 % — LOW (ref 13–44)
MACROCYTES BLD QL: SLIGHT — SIGNIFICANT CHANGE UP
MAGNESIUM SERPL-MCNC: 2 MG/DL — SIGNIFICANT CHANGE UP (ref 1.6–2.6)
MAGNESIUM SERPL-MCNC: 2.1 MG/DL — SIGNIFICANT CHANGE UP (ref 1.6–2.6)
MANUAL SMEAR VERIFICATION: SIGNIFICANT CHANGE UP
MCHC RBC-ENTMCNC: 28.6 PG — SIGNIFICANT CHANGE UP (ref 27–34)
MCHC RBC-ENTMCNC: 29.1 PG — SIGNIFICANT CHANGE UP (ref 27–34)
MCHC RBC-ENTMCNC: 29.4 PG — SIGNIFICANT CHANGE UP (ref 27–34)
MCHC RBC-ENTMCNC: 34.2 GM/DL — SIGNIFICANT CHANGE UP (ref 32–36)
MCHC RBC-ENTMCNC: 34.7 GM/DL — SIGNIFICANT CHANGE UP (ref 32–36)
MCHC RBC-ENTMCNC: 35 GM/DL — SIGNIFICANT CHANGE UP (ref 32–36)
MCV RBC AUTO: 83.6 FL — SIGNIFICANT CHANGE UP (ref 80–100)
MCV RBC AUTO: 83.7 FL — SIGNIFICANT CHANGE UP (ref 80–100)
MCV RBC AUTO: 84.1 FL — SIGNIFICANT CHANGE UP (ref 80–100)
MICROCYTES BLD QL: SLIGHT — SIGNIFICANT CHANGE UP
MONOCYTES # BLD AUTO: 0.3 K/UL — SIGNIFICANT CHANGE UP (ref 0–0.9)
MONOCYTES NFR BLD AUTO: 1.8 % — LOW (ref 2–14)
NEUTROPHILS # BLD AUTO: 15.52 K/UL — HIGH (ref 1.8–7.4)
NEUTROPHILS NFR BLD AUTO: 90.3 % — HIGH (ref 43–77)
NEUTS BAND # BLD: 2.6 % — SIGNIFICANT CHANGE UP (ref 0–8)
NRBC # BLD: 0 /100 WBCS — SIGNIFICANT CHANGE UP (ref 0–0)
NRBC # BLD: 0 /100 WBCS — SIGNIFICANT CHANGE UP (ref 0–0)
OVALOCYTES BLD QL SMEAR: SLIGHT — SIGNIFICANT CHANGE UP
PHOSPHATE SERPL-MCNC: 4.5 MG/DL — SIGNIFICANT CHANGE UP (ref 2.5–4.5)
PLAT MORPH BLD: ABNORMAL
PLATELET # BLD AUTO: 255 K/UL — SIGNIFICANT CHANGE UP (ref 150–400)
PLATELET # BLD AUTO: 292 K/UL — SIGNIFICANT CHANGE UP (ref 150–400)
PLATELET # BLD AUTO: 313 K/UL — SIGNIFICANT CHANGE UP (ref 150–400)
POIKILOCYTOSIS BLD QL AUTO: SLIGHT — SIGNIFICANT CHANGE UP
POLYCHROMASIA BLD QL SMEAR: SIGNIFICANT CHANGE UP
POTASSIUM SERPL-MCNC: 4.6 MMOL/L — SIGNIFICANT CHANGE UP (ref 3.5–5.3)
POTASSIUM SERPL-MCNC: 4.8 MMOL/L — SIGNIFICANT CHANGE UP (ref 3.5–5.3)
POTASSIUM SERPL-MCNC: 5 MMOL/L — SIGNIFICANT CHANGE UP (ref 3.5–5.3)
POTASSIUM SERPL-SCNC: 4.6 MMOL/L — SIGNIFICANT CHANGE UP (ref 3.5–5.3)
POTASSIUM SERPL-SCNC: 4.8 MMOL/L — SIGNIFICANT CHANGE UP (ref 3.5–5.3)
POTASSIUM SERPL-SCNC: 5 MMOL/L — SIGNIFICANT CHANGE UP (ref 3.5–5.3)
PROT SERPL-MCNC: 4.6 G/DL — LOW (ref 6–8.3)
PROT SERPL-MCNC: 5.1 G/DL — LOW (ref 6–8.3)
PROT SERPL-MCNC: 7.4 G/DL — SIGNIFICANT CHANGE UP (ref 6–8.3)
PROTHROM AB SERPL-ACNC: 14.4 SEC — HIGH (ref 10.5–13.4)
PROTHROM AB SERPL-ACNC: 17.6 SEC — HIGH (ref 10.5–13.4)
PROTHROM AB SERPL-ACNC: 18.3 SEC — HIGH (ref 10.5–13.4)
RBC # BLD: 2.89 M/UL — LOW (ref 4.2–5.8)
RBC # BLD: 2.89 M/UL — LOW (ref 4.2–5.8)
RBC # BLD: 4.76 M/UL — SIGNIFICANT CHANGE UP (ref 4.2–5.8)
RBC # FLD: 13.7 % — SIGNIFICANT CHANGE UP (ref 10.3–14.5)
RBC # FLD: 13.8 % — SIGNIFICANT CHANGE UP (ref 10.3–14.5)
RBC # FLD: 14.2 % — SIGNIFICANT CHANGE UP (ref 10.3–14.5)
RBC BLD AUTO: ABNORMAL
SODIUM SERPL-SCNC: 136 MMOL/L — SIGNIFICANT CHANGE UP (ref 135–145)
SODIUM SERPL-SCNC: 137 MMOL/L — SIGNIFICANT CHANGE UP (ref 135–145)
SODIUM SERPL-SCNC: 138 MMOL/L — SIGNIFICANT CHANGE UP (ref 135–145)
SPHEROCYTES BLD QL SMEAR: SLIGHT — SIGNIFICANT CHANGE UP
VARIANT LYMPHS # BLD: 0.9 % — SIGNIFICANT CHANGE UP (ref 0–6)
WBC # BLD: 10.68 K/UL — HIGH (ref 3.8–10.5)
WBC # BLD: 15.82 K/UL — HIGH (ref 3.8–10.5)
WBC # BLD: 16.71 K/UL — HIGH (ref 3.8–10.5)
WBC # FLD AUTO: 10.68 K/UL — HIGH (ref 3.8–10.5)
WBC # FLD AUTO: 15.82 K/UL — HIGH (ref 3.8–10.5)
WBC # FLD AUTO: 16.71 K/UL — HIGH (ref 3.8–10.5)

## 2022-07-25 PROCEDURE — 33519 CABG ARTERY-VEIN THREE: CPT

## 2022-07-25 PROCEDURE — 71045 X-RAY EXAM CHEST 1 VIEW: CPT | Mod: 26

## 2022-07-25 PROCEDURE — 33519 CABG ARTERY-VEIN THREE: CPT | Mod: AS

## 2022-07-25 PROCEDURE — 93010 ELECTROCARDIOGRAM REPORT: CPT

## 2022-07-25 PROCEDURE — 33533 CABG ARTERIAL SINGLE: CPT

## 2022-07-25 PROCEDURE — 33512 CABG VEIN THREE: CPT | Mod: AS

## 2022-07-25 PROCEDURE — 71045 X-RAY EXAM CHEST 1 VIEW: CPT | Mod: 26,77

## 2022-07-25 PROCEDURE — 76937 US GUIDE VASCULAR ACCESS: CPT | Mod: 26

## 2022-07-25 PROCEDURE — 88304 TISSUE EXAM BY PATHOLOGIST: CPT | Mod: 26

## 2022-07-25 PROCEDURE — 33533 CABG ARTERIAL SINGLE: CPT | Mod: AS

## 2022-07-25 PROCEDURE — 15738 MUSCLE-SKIN GRAFT LEG: CPT | Mod: GC

## 2022-07-25 PROCEDURE — 35286 RPR BLVSL GRF OTH/TH VN LXTR: CPT | Mod: GC,RT

## 2022-07-25 PROCEDURE — 33508 ENDOSCOPIC VEIN HARVEST: CPT | Mod: AS,59

## 2022-07-25 DEVICE — CATH VENT LEFT HEART SILICONE 16FR NON-VENTED: Type: IMPLANTABLE DEVICE | Status: FUNCTIONAL

## 2022-07-25 DEVICE — LIGATING CLIPS WECK HORIZON MEDIUM (BLUE) 24: Type: IMPLANTABLE DEVICE | Status: FUNCTIONAL

## 2022-07-25 DEVICE — CLIP APPLIER ETHICON LIGACLIP 9 3/8" SMALL: Type: IMPLANTABLE DEVICE | Status: FUNCTIONAL

## 2022-07-25 DEVICE — CANNULA VENOUS 3 STAGE STANDARD 29/37/37FR X 1/2": Type: IMPLANTABLE DEVICE | Status: FUNCTIONAL

## 2022-07-25 DEVICE — CHEST DRAIN THORACIC PVC 32FR: Type: IMPLANTABLE DEVICE | Status: FUNCTIONAL

## 2022-07-25 DEVICE — INTRO MICROPUNC 4FRX10CM SS: Type: IMPLANTABLE DEVICE | Status: FUNCTIONAL

## 2022-07-25 DEVICE — CATH VENT LEFT HEART SILICONE 20FR NON-VENTED: Type: IMPLANTABLE DEVICE | Status: FUNCTIONAL

## 2022-07-25 DEVICE — PACING WIRE STREAMLINE BIPOLAR MYOCARDIAL: Type: IMPLANTABLE DEVICE | Status: FUNCTIONAL

## 2022-07-25 DEVICE — LIGATING CLIPS WECK HORIZON SMALL (YELLOW) 24: Type: IMPLANTABLE DEVICE | Status: FUNCTIONAL

## 2022-07-25 DEVICE — PATCH PERI-GUARD RPR 6 CM X 8 CM: Type: IMPLANTABLE DEVICE | Status: FUNCTIONAL

## 2022-07-25 DEVICE — KIT CVC 3LUM SPECTRUM 9FR: Type: IMPLANTABLE DEVICE | Status: FUNCTIONAL

## 2022-07-25 DEVICE — SURGIFLO HEMOSTATIC MATRIX KIT: Type: IMPLANTABLE DEVICE | Status: FUNCTIONAL

## 2022-07-25 DEVICE — SHEATH INTRODUCER TERUMO PINNACLE PERIPHERAL 4FR X 10CM X 0.035" MINI WIRE: Type: IMPLANTABLE DEVICE | Status: FUNCTIONAL

## 2022-07-25 DEVICE — CANNULA VESSEL 3MM BLUNT TIP CLEAR 1-WAY VALVE: Type: IMPLANTABLE DEVICE | Status: FUNCTIONAL

## 2022-07-25 DEVICE — CATH SILICONE THORACIC STR 24FR 20/BX: Type: IMPLANTABLE DEVICE | Status: FUNCTIONAL

## 2022-07-25 DEVICE — LIGATING CLIPS WECK HORIZON SMALL-WIDE (RED) 24: Type: IMPLANTABLE DEVICE | Status: FUNCTIONAL

## 2022-07-25 RX ORDER — SODIUM CHLORIDE 9 MG/ML
1000 INJECTION INTRAMUSCULAR; INTRAVENOUS; SUBCUTANEOUS
Refills: 0 | Status: DISCONTINUED | OUTPATIENT
Start: 2022-07-25 | End: 2022-08-01

## 2022-07-25 RX ORDER — HEPARIN SODIUM 5000 [USP'U]/ML
1550 INJECTION INTRAVENOUS; SUBCUTANEOUS
Qty: 25000 | Refills: 0 | Status: DISCONTINUED | OUTPATIENT
Start: 2022-07-25 | End: 2022-07-25

## 2022-07-25 RX ORDER — PANTOPRAZOLE SODIUM 20 MG/1
40 TABLET, DELAYED RELEASE ORAL DAILY
Refills: 0 | Status: DISCONTINUED | OUTPATIENT
Start: 2022-07-25 | End: 2022-07-25

## 2022-07-25 RX ORDER — PHENYLEPHRINE HYDROCHLORIDE 10 MG/ML
0.1 INJECTION INTRAVENOUS
Qty: 40 | Refills: 0 | Status: DISCONTINUED | OUTPATIENT
Start: 2022-07-25 | End: 2022-07-28

## 2022-07-25 RX ORDER — ACETAMINOPHEN 500 MG
650 TABLET ORAL EVERY 6 HOURS
Refills: 0 | Status: DISCONTINUED | OUTPATIENT
Start: 2022-07-25 | End: 2022-07-27

## 2022-07-25 RX ORDER — OXYCODONE HYDROCHLORIDE 5 MG/1
10 TABLET ORAL EVERY 6 HOURS
Refills: 0 | Status: DISCONTINUED | OUTPATIENT
Start: 2022-07-25 | End: 2022-07-31

## 2022-07-25 RX ORDER — HEPARIN SODIUM 5000 [USP'U]/ML
5000 INJECTION INTRAVENOUS; SUBCUTANEOUS EVERY 8 HOURS
Refills: 0 | Status: DISCONTINUED | OUTPATIENT
Start: 2022-07-25 | End: 2022-08-01

## 2022-07-25 RX ORDER — FENTANYL CITRATE 50 UG/ML
25 INJECTION INTRAVENOUS
Refills: 0 | Status: DISCONTINUED | OUTPATIENT
Start: 2022-07-25 | End: 2022-07-27

## 2022-07-25 RX ORDER — CEFAZOLIN SODIUM 1 G
2000 VIAL (EA) INJECTION EVERY 8 HOURS
Refills: 0 | Status: COMPLETED | OUTPATIENT
Start: 2022-07-25 | End: 2022-07-27

## 2022-07-25 RX ORDER — PROPOFOL 10 MG/ML
10 INJECTION, EMULSION INTRAVENOUS
Qty: 1000 | Refills: 0 | Status: DISCONTINUED | OUTPATIENT
Start: 2022-07-25 | End: 2022-07-25

## 2022-07-25 RX ORDER — INSULIN HUMAN 100 [IU]/ML
2 INJECTION, SOLUTION SUBCUTANEOUS
Qty: 50 | Refills: 0 | Status: DISCONTINUED | OUTPATIENT
Start: 2022-07-25 | End: 2022-07-27

## 2022-07-25 RX ORDER — DEXMEDETOMIDINE HYDROCHLORIDE IN 0.9% SODIUM CHLORIDE 4 UG/ML
0.1 INJECTION INTRAVENOUS
Qty: 200 | Refills: 0 | Status: DISCONTINUED | OUTPATIENT
Start: 2022-07-25 | End: 2022-07-26

## 2022-07-25 RX ORDER — ALBUMIN HUMAN 25 %
50 VIAL (ML) INTRAVENOUS
Refills: 0 | Status: DISCONTINUED | OUTPATIENT
Start: 2022-07-25 | End: 2022-07-28

## 2022-07-25 RX ORDER — ACETAMINOPHEN 500 MG
1000 TABLET ORAL ONCE
Refills: 0 | Status: COMPLETED | OUTPATIENT
Start: 2022-07-25 | End: 2022-07-25

## 2022-07-25 RX ORDER — CALCIUM GLUCONATE 100 MG/ML
2 VIAL (ML) INTRAVENOUS ONCE
Refills: 0 | Status: COMPLETED | OUTPATIENT
Start: 2022-07-25 | End: 2022-07-25

## 2022-07-25 RX ORDER — POLYETHYLENE GLYCOL 3350 17 G/17G
17 POWDER, FOR SOLUTION ORAL DAILY
Refills: 0 | Status: DISCONTINUED | OUTPATIENT
Start: 2022-07-25 | End: 2022-08-01

## 2022-07-25 RX ORDER — DEXTROSE 50 % IN WATER 50 %
50 SYRINGE (ML) INTRAVENOUS
Refills: 0 | Status: DISCONTINUED | OUTPATIENT
Start: 2022-07-25 | End: 2022-07-27

## 2022-07-25 RX ORDER — CHLORHEXIDINE GLUCONATE 213 G/1000ML
15 SOLUTION TOPICAL EVERY 12 HOURS
Refills: 0 | Status: DISCONTINUED | OUTPATIENT
Start: 2022-07-25 | End: 2022-07-25

## 2022-07-25 RX ORDER — PANTOPRAZOLE SODIUM 20 MG/1
40 TABLET, DELAYED RELEASE ORAL
Refills: 0 | Status: DISCONTINUED | OUTPATIENT
Start: 2022-07-25 | End: 2022-08-01

## 2022-07-25 RX ORDER — DEXTROSE 50 % IN WATER 50 %
25 SYRINGE (ML) INTRAVENOUS
Refills: 0 | Status: DISCONTINUED | OUTPATIENT
Start: 2022-07-25 | End: 2022-07-27

## 2022-07-25 RX ORDER — OXYCODONE HYDROCHLORIDE 5 MG/1
5 TABLET ORAL EVERY 4 HOURS
Refills: 0 | Status: DISCONTINUED | OUTPATIENT
Start: 2022-07-25 | End: 2022-07-30

## 2022-07-25 RX ORDER — NOREPINEPHRINE BITARTRATE/D5W 8 MG/250ML
0.04 PLASTIC BAG, INJECTION (ML) INTRAVENOUS
Qty: 8 | Refills: 0 | Status: DISCONTINUED | OUTPATIENT
Start: 2022-07-25 | End: 2022-07-28

## 2022-07-25 RX ADMIN — DEXMEDETOMIDINE HYDROCHLORIDE IN 0.9% SODIUM CHLORIDE 2.75 MICROGRAM(S)/KG/HR: 4 INJECTION INTRAVENOUS at 19:19

## 2022-07-25 RX ADMIN — PHENYLEPHRINE HYDROCHLORIDE 4.12 MICROGRAM(S)/KG/MIN: 10 INJECTION INTRAVENOUS at 19:19

## 2022-07-25 RX ADMIN — SODIUM CHLORIDE 10 MILLILITER(S): 9 INJECTION INTRAMUSCULAR; INTRAVENOUS; SUBCUTANEOUS at 19:18

## 2022-07-25 RX ADMIN — Medication 1000 MILLIGRAM(S): at 22:30

## 2022-07-25 RX ADMIN — CHLORHEXIDINE GLUCONATE 15 MILLILITER(S): 213 SOLUTION TOPICAL at 05:05

## 2022-07-25 RX ADMIN — PANTOPRAZOLE SODIUM 40 MILLIGRAM(S): 20 TABLET, DELAYED RELEASE ORAL at 05:03

## 2022-07-25 RX ADMIN — CHLORHEXIDINE GLUCONATE 1 APPLICATION(S): 213 SOLUTION TOPICAL at 05:05

## 2022-07-25 RX ADMIN — Medication 200 GRAM(S): at 19:20

## 2022-07-25 RX ADMIN — INSULIN HUMAN 2 UNIT(S)/HR: 100 INJECTION, SOLUTION SUBCUTANEOUS at 19:57

## 2022-07-25 RX ADMIN — Medication 400 MILLIGRAM(S): at 22:06

## 2022-07-25 RX ADMIN — Medication 12.5 MILLIGRAM(S): at 05:03

## 2022-07-25 RX ADMIN — SODIUM CHLORIDE 3 MILLILITER(S): 9 INJECTION INTRAMUSCULAR; INTRAVENOUS; SUBCUTANEOUS at 05:01

## 2022-07-25 RX ADMIN — PHENYLEPHRINE HYDROCHLORIDE 4.12 MICROGRAM(S)/KG/MIN: 10 INJECTION INTRAVENOUS at 23:30

## 2022-07-25 RX ADMIN — Medication 50 MILLILITER(S): at 18:30

## 2022-07-25 NOTE — BRIEF OPERATIVE NOTE - COMMENTS
I first assisted for the entirety of the case, including but not limited to conduit harvest, cannulation, distal/proximal anastamoses, decannulation, and chest closure. I first assisted for the entirety of the case, including but not limited to conduit harvest, cannulation, distal/proximal anastamoses, decannulation, and chest closure.    Of note, after the cardiac surgery was finished,  Vascular team (Dr. Benavides) performed a Right groin cutdown, exploration and evacuation of hematoma, repair or right common femoral artery, and sartorius muscle flap. This portion will be dictated by the Vascular team.    Patient arrived to CTICU intubated, HD stable on Levo 0.03mcg/kg/min

## 2022-07-25 NOTE — BRIEF OPERATIVE NOTE - OPERATION/FINDINGS
Patient intubated per anesthesiology for planned 4-vessel CABG, uncomplicated. Initial bedside ultrasound showing approximately 0.9cm-1.0cm pseudoaneurysm neck that would have been unlikely to respond to thrombin injection. Decision to cutdown to femoral artery was made. Longitudinal vertical groin incision performed. Large pseudoaneurysm capsule evacuated. Site of bleeding was identified, approximately 1-2mm arteriotomy in the common femoral artery was noted. Arteriotomy primarily closed with 5-0 prolene suture. After closing arteriotomy with prolene sutures, the entirety of CFA was explored with approximately 2cm arteriotomy using Giang scissors. No dissections or thrombus noted. Bovine pericardium patch used to repair arteriotomy. Upon closing, the fascial layer lacked structural integrity and was unable to be closed with vicryl sutures, so the decision to perform a sartorious flap was made. Flap then closed over CFA with vicryl sutures. Skin closed with staples and nylon sutures. 2 10Fr ASHU drains left in place in the harvest site space and recipient site space. Sterile dressing applied. Palpable postop pedal pulses bilaterally.

## 2022-07-25 NOTE — BRIEF OPERATIVE NOTE - NSICDXBRIEFPROCEDURE_GEN_ALL_CORE_FT
PROCEDURES:  CABG, with NICK 25-Jul-2022 14:25:29 LIMA to LAD, SVG to PDA, SVG to OM, SVG to Diagonal MainSabiha wooten  
PROCEDURES:  Repair of femoral pseudoaneurysm 25-Jul-2022 18:12:14  Anita Correa

## 2022-07-25 NOTE — BRIEF OPERATIVE NOTE - NSICDXBRIEFPREOP_GEN_ALL_CORE_FT
PRE-OP DIAGNOSIS:  Pseudoaneurysm of right femoral artery 25-Jul-2022 18:12:37  Anita Correa  
PRE-OP DIAGNOSIS:  CAD (coronary artery disease) 25-Jul-2022 14:26:05  Sabiha Nguyen

## 2022-07-25 NOTE — BRIEF OPERATIVE NOTE - OPERATION/FINDINGS
Detail Level: Generalized Detail Level: Zone EF 30%    GSV harvest time: 45 min  GSV prep time: 14 min EF 45%    GSV harvest time: 45 min  GSV prep time: 14 min

## 2022-07-25 NOTE — PROGRESS NOTE ADULT - SUBJECTIVE AND OBJECTIVE BOX
Vascular Surgery Post-Op Note    Procedure: s/p repair of femoral pseudoaneurysm     Diagnosis/Indication: pseudoaneurysm of R femoral artery     Surgeon: Dr. Benavides    S: Pt is intubated on pressors. able to be aroused and moves all extremities and follow commands. Denies CP, SOB, BEYER, calf tenderness. Pain controlled with medication.    O:  T(C): 36.1 (07-25-22 @ 17:59), Max: 36.1 (07-25-22 @ 17:59)  T(F): 97 (07-25-22 @ 17:59), Max: 97 (07-25-22 @ 17:59)  HR: 82 (07-25-22 @ 20:00) (82 - 99)  BP: --  RR: 12 (07-25-22 @ 20:00) (12 - 14)  SpO2: 100% (07-25-22 @ 20:00) (100% - 100%)  Wt(kg): --                        8.5    16.71 )-----------( 255      ( 25 Jul 2022 17:29 )             24.3     07-25    138  |  105  |  16  ----------------------------<  170<H>  4.8   |  26  |  1.12    Ca    8.2<L>      25 Jul 2022 17:29  Mg     2.0     07-25    TPro  4.6<L>  /  Alb  3.3  /  TBili  1.3<H>  /  DBili  x   /  AST  55<H>  /  ALT  28  /  AlkPhos  48  07-25      Gen: NAD, resting comfortably in bed  C/V: NSR  Pulm: Nonlabored breathing, no respiratory distress  Abd: soft, NT/ND  Extrem: WWP, no calf edema, SCDs in place. R fem cutdown has ecchymosis, dressing C/D/I, ASHU are minimal output, bloody. palpable distal pulses. has good motor sensory function.        A/P: 57yMale s/p above procedure  Diet: npo  IVF: n/a   Pain/nausea control  DVT ppx: sqh   Dispo plan: [facility] [place]

## 2022-07-25 NOTE — BRIEF OPERATIVE NOTE - NSICDXBRIEFPOSTOP_GEN_ALL_CORE_FT
POST-OP DIAGNOSIS:  Pseudoaneurysm of right femoral artery 25-Jul-2022 18:12:53  Anita Correa  
POST-OP DIAGNOSIS:  CAD (coronary artery disease) 25-Jul-2022 14:26:16  Sabiha Nguyen V

## 2022-07-26 LAB
ALBUMIN SERPL ELPH-MCNC: 3 G/DL — LOW (ref 3.3–5)
ALBUMIN SERPL ELPH-MCNC: 3.3 G/DL — SIGNIFICANT CHANGE UP (ref 3.3–5)
ALBUMIN SERPL ELPH-MCNC: 3.4 G/DL — SIGNIFICANT CHANGE UP (ref 3.3–5)
ALBUMIN SERPL ELPH-MCNC: 3.5 G/DL — SIGNIFICANT CHANGE UP (ref 3.3–5)
ALBUMIN SERPL ELPH-MCNC: 3.5 G/DL — SIGNIFICANT CHANGE UP (ref 3.3–5)
ALP SERPL-CCNC: 41 U/L — SIGNIFICANT CHANGE UP (ref 40–120)
ALP SERPL-CCNC: 44 U/L — SIGNIFICANT CHANGE UP (ref 40–120)
ALP SERPL-CCNC: 45 U/L — SIGNIFICANT CHANGE UP (ref 40–120)
ALP SERPL-CCNC: 46 U/L — SIGNIFICANT CHANGE UP (ref 40–120)
ALP SERPL-CCNC: 46 U/L — SIGNIFICANT CHANGE UP (ref 40–120)
ALT FLD-CCNC: 19 U/L — SIGNIFICANT CHANGE UP (ref 10–45)
ALT FLD-CCNC: 23 U/L — SIGNIFICANT CHANGE UP (ref 10–45)
ALT FLD-CCNC: 24 U/L — SIGNIFICANT CHANGE UP (ref 10–45)
ALT FLD-CCNC: 25 U/L — SIGNIFICANT CHANGE UP (ref 10–45)
ALT FLD-CCNC: 28 U/L — SIGNIFICANT CHANGE UP (ref 10–45)
ANION GAP SERPL CALC-SCNC: 11 MMOL/L — SIGNIFICANT CHANGE UP (ref 5–17)
ANION GAP SERPL CALC-SCNC: 12 MMOL/L — SIGNIFICANT CHANGE UP (ref 5–17)
ANION GAP SERPL CALC-SCNC: 12 MMOL/L — SIGNIFICANT CHANGE UP (ref 5–17)
ANION GAP SERPL CALC-SCNC: 8 MMOL/L — SIGNIFICANT CHANGE UP (ref 5–17)
ANION GAP SERPL CALC-SCNC: 9 MMOL/L — SIGNIFICANT CHANGE UP (ref 5–17)
APTT BLD: 26.5 SEC — LOW (ref 27.5–35.5)
APTT BLD: 27.8 SEC — SIGNIFICANT CHANGE UP (ref 27.5–35.5)
APTT BLD: 27.9 SEC — SIGNIFICANT CHANGE UP (ref 27.5–35.5)
APTT BLD: 28.3 SEC — SIGNIFICANT CHANGE UP (ref 27.5–35.5)
AST SERPL-CCNC: 44 U/L — HIGH (ref 10–40)
AST SERPL-CCNC: 49 U/L — HIGH (ref 10–40)
AST SERPL-CCNC: 50 U/L — HIGH (ref 10–40)
AST SERPL-CCNC: 50 U/L — HIGH (ref 10–40)
AST SERPL-CCNC: 58 U/L — HIGH (ref 10–40)
BILIRUB SERPL-MCNC: 0.7 MG/DL — SIGNIFICANT CHANGE UP (ref 0.2–1.2)
BILIRUB SERPL-MCNC: 0.9 MG/DL — SIGNIFICANT CHANGE UP (ref 0.2–1.2)
BILIRUB SERPL-MCNC: 0.9 MG/DL — SIGNIFICANT CHANGE UP (ref 0.2–1.2)
BILIRUB SERPL-MCNC: 1.5 MG/DL — HIGH (ref 0.2–1.2)
BILIRUB SERPL-MCNC: 1.6 MG/DL — HIGH (ref 0.2–1.2)
BUN SERPL-MCNC: 15 MG/DL — SIGNIFICANT CHANGE UP (ref 7–23)
BUN SERPL-MCNC: 15 MG/DL — SIGNIFICANT CHANGE UP (ref 7–23)
BUN SERPL-MCNC: 17 MG/DL — SIGNIFICANT CHANGE UP (ref 7–23)
BUN SERPL-MCNC: 17 MG/DL — SIGNIFICANT CHANGE UP (ref 7–23)
BUN SERPL-MCNC: 18 MG/DL — SIGNIFICANT CHANGE UP (ref 7–23)
CALCIUM SERPL-MCNC: 8.1 MG/DL — LOW (ref 8.4–10.5)
CALCIUM SERPL-MCNC: 8.2 MG/DL — LOW (ref 8.4–10.5)
CALCIUM SERPL-MCNC: 8.3 MG/DL — LOW (ref 8.4–10.5)
CALCIUM SERPL-MCNC: 8.3 MG/DL — LOW (ref 8.4–10.5)
CALCIUM SERPL-MCNC: 8.4 MG/DL — SIGNIFICANT CHANGE UP (ref 8.4–10.5)
CHLORIDE SERPL-SCNC: 103 MMOL/L — SIGNIFICANT CHANGE UP (ref 96–108)
CHLORIDE SERPL-SCNC: 103 MMOL/L — SIGNIFICANT CHANGE UP (ref 96–108)
CHLORIDE SERPL-SCNC: 104 MMOL/L — SIGNIFICANT CHANGE UP (ref 96–108)
CO2 SERPL-SCNC: 21 MMOL/L — LOW (ref 22–31)
CO2 SERPL-SCNC: 22 MMOL/L — SIGNIFICANT CHANGE UP (ref 22–31)
CO2 SERPL-SCNC: 23 MMOL/L — SIGNIFICANT CHANGE UP (ref 22–31)
CO2 SERPL-SCNC: 24 MMOL/L — SIGNIFICANT CHANGE UP (ref 22–31)
CO2 SERPL-SCNC: 25 MMOL/L — SIGNIFICANT CHANGE UP (ref 22–31)
CREAT SERPL-MCNC: 1.13 MG/DL — SIGNIFICANT CHANGE UP (ref 0.5–1.3)
CREAT SERPL-MCNC: 1.15 MG/DL — SIGNIFICANT CHANGE UP (ref 0.5–1.3)
CREAT SERPL-MCNC: 1.18 MG/DL — SIGNIFICANT CHANGE UP (ref 0.5–1.3)
CREAT SERPL-MCNC: 1.19 MG/DL — SIGNIFICANT CHANGE UP (ref 0.5–1.3)
CREAT SERPL-MCNC: 1.21 MG/DL — SIGNIFICANT CHANGE UP (ref 0.5–1.3)
EGFR: 71 ML/MIN/1.73M2 — SIGNIFICANT CHANGE UP
EGFR: 72 ML/MIN/1.73M2 — SIGNIFICANT CHANGE UP
EGFR: 73 ML/MIN/1.73M2 — SIGNIFICANT CHANGE UP
EGFR: 75 ML/MIN/1.73M2 — SIGNIFICANT CHANGE UP
EGFR: 77 ML/MIN/1.73M2 — SIGNIFICANT CHANGE UP
GAS PNL BLDA: SIGNIFICANT CHANGE UP
GLUCOSE BLDC GLUCOMTR-MCNC: 103 MG/DL — HIGH (ref 70–99)
GLUCOSE BLDC GLUCOMTR-MCNC: 113 MG/DL — HIGH (ref 70–99)
GLUCOSE BLDC GLUCOMTR-MCNC: 114 MG/DL — HIGH (ref 70–99)
GLUCOSE BLDC GLUCOMTR-MCNC: 118 MG/DL — HIGH (ref 70–99)
GLUCOSE BLDC GLUCOMTR-MCNC: 120 MG/DL — HIGH (ref 70–99)
GLUCOSE BLDC GLUCOMTR-MCNC: 122 MG/DL — HIGH (ref 70–99)
GLUCOSE BLDC GLUCOMTR-MCNC: 122 MG/DL — HIGH (ref 70–99)
GLUCOSE BLDC GLUCOMTR-MCNC: 123 MG/DL — HIGH (ref 70–99)
GLUCOSE BLDC GLUCOMTR-MCNC: 123 MG/DL — HIGH (ref 70–99)
GLUCOSE BLDC GLUCOMTR-MCNC: 128 MG/DL — HIGH (ref 70–99)
GLUCOSE BLDC GLUCOMTR-MCNC: 129 MG/DL — HIGH (ref 70–99)
GLUCOSE BLDC GLUCOMTR-MCNC: 135 MG/DL — HIGH (ref 70–99)
GLUCOSE BLDC GLUCOMTR-MCNC: 138 MG/DL — HIGH (ref 70–99)
GLUCOSE BLDC GLUCOMTR-MCNC: 139 MG/DL — HIGH (ref 70–99)
GLUCOSE BLDC GLUCOMTR-MCNC: 143 MG/DL — HIGH (ref 70–99)
GLUCOSE BLDC GLUCOMTR-MCNC: 96 MG/DL — SIGNIFICANT CHANGE UP (ref 70–99)
GLUCOSE SERPL-MCNC: 129 MG/DL — HIGH (ref 70–99)
GLUCOSE SERPL-MCNC: 132 MG/DL — HIGH (ref 70–99)
GLUCOSE SERPL-MCNC: 137 MG/DL — HIGH (ref 70–99)
GLUCOSE SERPL-MCNC: 141 MG/DL — HIGH (ref 70–99)
GLUCOSE SERPL-MCNC: 146 MG/DL — HIGH (ref 70–99)
HCT VFR BLD CALC: 23.5 % — LOW (ref 39–50)
HCT VFR BLD CALC: 23.7 % — LOW (ref 39–50)
HCT VFR BLD CALC: 24.6 % — LOW (ref 39–50)
HCT VFR BLD CALC: 25.5 % — LOW (ref 39–50)
HCT VFR BLD CALC: 25.9 % — LOW (ref 39–50)
HGB BLD-MCNC: 8.2 G/DL — LOW (ref 13–17)
HGB BLD-MCNC: 8.2 G/DL — LOW (ref 13–17)
HGB BLD-MCNC: 8.7 G/DL — LOW (ref 13–17)
HGB BLD-MCNC: 8.8 G/DL — LOW (ref 13–17)
HGB BLD-MCNC: 8.9 G/DL — LOW (ref 13–17)
INR BLD: 1.51 — HIGH (ref 0.88–1.16)
INR BLD: 1.63 — HIGH (ref 0.88–1.16)
INR BLD: 1.66 — HIGH (ref 0.88–1.16)
INR BLD: 1.66 — HIGH (ref 0.88–1.16)
ISTAT ACTK (ACTIVATED CLOTTING TIME KAOLIN): 132 SEC — SIGNIFICANT CHANGE UP (ref 74–137)
ISTAT ACTK (ACTIVATED CLOTTING TIME KAOLIN): 132 SEC — SIGNIFICANT CHANGE UP (ref 74–137)
ISTAT ACTK (ACTIVATED CLOTTING TIME KAOLIN): 144 SEC — HIGH (ref 74–137)
ISTAT ACTK (ACTIVATED CLOTTING TIME KAOLIN): 144 SEC — HIGH (ref 74–137)
ISTAT ACTK (ACTIVATED CLOTTING TIME KAOLIN): 161 SEC — HIGH (ref 74–137)
ISTAT ACTK (ACTIVATED CLOTTING TIME KAOLIN): 173 SEC — HIGH (ref 74–137)
ISTAT ACTK (ACTIVATED CLOTTING TIME KAOLIN): 184 SEC — HIGH (ref 74–137)
ISTAT ACTK (ACTIVATED CLOTTING TIME KAOLIN): 184 SEC — HIGH (ref 74–137)
ISTAT ACTK (ACTIVATED CLOTTING TIME KAOLIN): 190 SEC — HIGH (ref 74–137)
ISTAT ACTK (ACTIVATED CLOTTING TIME KAOLIN): 202 SEC — HIGH (ref 74–137)
ISTAT ACTK (ACTIVATED CLOTTING TIME KAOLIN): 213 SEC — HIGH (ref 74–137)
ISTAT ACTK (ACTIVATED CLOTTING TIME KAOLIN): 225 SEC — HIGH (ref 74–137)
ISTAT ACTK (ACTIVATED CLOTTING TIME KAOLIN): 237 SEC — HIGH (ref 74–137)
ISTAT ACTK (ACTIVATED CLOTTING TIME KAOLIN): 265 SEC — HIGH (ref 74–137)
ISTAT ACTK (ACTIVATED CLOTTING TIME KAOLIN): 341 SEC — HIGH (ref 74–137)
ISTAT ACTK (ACTIVATED CLOTTING TIME KAOLIN): 370 SEC — HIGH (ref 74–137)
ISTAT ACTK (ACTIVATED CLOTTING TIME KAOLIN): 370 SEC — HIGH (ref 74–137)
ISTAT ACTK (ACTIVATED CLOTTING TIME KAOLIN): 387 SEC — HIGH (ref 74–137)
ISTAT ACTK (ACTIVATED CLOTTING TIME KAOLIN): 410 SEC — HIGH (ref 74–137)
ISTAT ACTK (ACTIVATED CLOTTING TIME KAOLIN): 416 SEC — HIGH (ref 74–137)
ISTAT ACTK (ACTIVATED CLOTTING TIME KAOLIN): 451 SEC — HIGH (ref 74–137)
ISTAT ACTK (ACTIVATED CLOTTING TIME KAOLIN): 520 SEC — HIGH (ref 74–137)
LACTATE SERPL-SCNC: 1.2 MMOL/L — SIGNIFICANT CHANGE UP (ref 0.5–2)
MAGNESIUM SERPL-MCNC: 2 MG/DL — SIGNIFICANT CHANGE UP (ref 1.6–2.6)
MAGNESIUM SERPL-MCNC: 2 MG/DL — SIGNIFICANT CHANGE UP (ref 1.6–2.6)
MAGNESIUM SERPL-MCNC: 2.1 MG/DL — SIGNIFICANT CHANGE UP (ref 1.6–2.6)
MAGNESIUM SERPL-MCNC: 2.2 MG/DL — SIGNIFICANT CHANGE UP (ref 1.6–2.6)
MCHC RBC-ENTMCNC: 29.1 PG — SIGNIFICANT CHANGE UP (ref 27–34)
MCHC RBC-ENTMCNC: 29.7 PG — SIGNIFICANT CHANGE UP (ref 27–34)
MCHC RBC-ENTMCNC: 29.7 PG — SIGNIFICANT CHANGE UP (ref 27–34)
MCHC RBC-ENTMCNC: 29.8 PG — SIGNIFICANT CHANGE UP (ref 27–34)
MCHC RBC-ENTMCNC: 29.9 PG — SIGNIFICANT CHANGE UP (ref 27–34)
MCHC RBC-ENTMCNC: 34 GM/DL — SIGNIFICANT CHANGE UP (ref 32–36)
MCHC RBC-ENTMCNC: 34.6 GM/DL — SIGNIFICANT CHANGE UP (ref 32–36)
MCHC RBC-ENTMCNC: 34.9 GM/DL — SIGNIFICANT CHANGE UP (ref 32–36)
MCHC RBC-ENTMCNC: 34.9 GM/DL — SIGNIFICANT CHANGE UP (ref 32–36)
MCHC RBC-ENTMCNC: 35.4 GM/DL — SIGNIFICANT CHANGE UP (ref 32–36)
MCV RBC AUTO: 84.5 FL — SIGNIFICANT CHANGE UP (ref 80–100)
MCV RBC AUTO: 85 FL — SIGNIFICANT CHANGE UP (ref 80–100)
MCV RBC AUTO: 85.1 FL — SIGNIFICANT CHANGE UP (ref 80–100)
MCV RBC AUTO: 85.8 FL — SIGNIFICANT CHANGE UP (ref 80–100)
MCV RBC AUTO: 86.2 FL — SIGNIFICANT CHANGE UP (ref 80–100)
NRBC # BLD: 0 /100 WBCS — SIGNIFICANT CHANGE UP (ref 0–0)
PHOSPHATE SERPL-MCNC: 2.3 MG/DL — LOW (ref 2.5–4.5)
PHOSPHATE SERPL-MCNC: 2.9 MG/DL — SIGNIFICANT CHANGE UP (ref 2.5–4.5)
PHOSPHATE SERPL-MCNC: 3.5 MG/DL — SIGNIFICANT CHANGE UP (ref 2.5–4.5)
PHOSPHATE SERPL-MCNC: 4.3 MG/DL — SIGNIFICANT CHANGE UP (ref 2.5–4.5)
PLATELET # BLD AUTO: 212 K/UL — SIGNIFICANT CHANGE UP (ref 150–400)
PLATELET # BLD AUTO: 221 K/UL — SIGNIFICANT CHANGE UP (ref 150–400)
PLATELET # BLD AUTO: 240 K/UL — SIGNIFICANT CHANGE UP (ref 150–400)
PLATELET # BLD AUTO: 268 K/UL — SIGNIFICANT CHANGE UP (ref 150–400)
PLATELET # BLD AUTO: 317 K/UL — SIGNIFICANT CHANGE UP (ref 150–400)
POTASSIUM SERPL-MCNC: 4.2 MMOL/L — SIGNIFICANT CHANGE UP (ref 3.5–5.3)
POTASSIUM SERPL-MCNC: 4.2 MMOL/L — SIGNIFICANT CHANGE UP (ref 3.5–5.3)
POTASSIUM SERPL-MCNC: 4.3 MMOL/L — SIGNIFICANT CHANGE UP (ref 3.5–5.3)
POTASSIUM SERPL-MCNC: 4.3 MMOL/L — SIGNIFICANT CHANGE UP (ref 3.5–5.3)
POTASSIUM SERPL-MCNC: 4.8 MMOL/L — SIGNIFICANT CHANGE UP (ref 3.5–5.3)
POTASSIUM SERPL-SCNC: 4.2 MMOL/L — SIGNIFICANT CHANGE UP (ref 3.5–5.3)
POTASSIUM SERPL-SCNC: 4.2 MMOL/L — SIGNIFICANT CHANGE UP (ref 3.5–5.3)
POTASSIUM SERPL-SCNC: 4.3 MMOL/L — SIGNIFICANT CHANGE UP (ref 3.5–5.3)
POTASSIUM SERPL-SCNC: 4.3 MMOL/L — SIGNIFICANT CHANGE UP (ref 3.5–5.3)
POTASSIUM SERPL-SCNC: 4.8 MMOL/L — SIGNIFICANT CHANGE UP (ref 3.5–5.3)
PROT SERPL-MCNC: 5.1 G/DL — LOW (ref 6–8.3)
PROT SERPL-MCNC: 5.3 G/DL — LOW (ref 6–8.3)
PROT SERPL-MCNC: 5.6 G/DL — LOW (ref 6–8.3)
PROTHROM AB SERPL-ACNC: 18.1 SEC — HIGH (ref 10.5–13.4)
PROTHROM AB SERPL-ACNC: 19.5 SEC — HIGH (ref 10.5–13.4)
PROTHROM AB SERPL-ACNC: 19.8 SEC — HIGH (ref 10.5–13.4)
PROTHROM AB SERPL-ACNC: 19.8 SEC — HIGH (ref 10.5–13.4)
RBC # BLD: 2.75 M/UL — LOW (ref 4.2–5.8)
RBC # BLD: 2.76 M/UL — LOW (ref 4.2–5.8)
RBC # BLD: 2.91 M/UL — LOW (ref 4.2–5.8)
RBC # BLD: 3 M/UL — LOW (ref 4.2–5.8)
RBC # BLD: 3.02 M/UL — LOW (ref 4.2–5.8)
RBC # FLD: 14.6 % — HIGH (ref 10.3–14.5)
RBC # FLD: 14.6 % — HIGH (ref 10.3–14.5)
RBC # FLD: 14.7 % — HIGH (ref 10.3–14.5)
RBC # FLD: 14.8 % — HIGH (ref 10.3–14.5)
RBC # FLD: 14.9 % — HIGH (ref 10.3–14.5)
SODIUM SERPL-SCNC: 136 MMOL/L — SIGNIFICANT CHANGE UP (ref 135–145)
SODIUM SERPL-SCNC: 136 MMOL/L — SIGNIFICANT CHANGE UP (ref 135–145)
SODIUM SERPL-SCNC: 137 MMOL/L — SIGNIFICANT CHANGE UP (ref 135–145)
SODIUM SERPL-SCNC: 138 MMOL/L — SIGNIFICANT CHANGE UP (ref 135–145)
SODIUM SERPL-SCNC: 138 MMOL/L — SIGNIFICANT CHANGE UP (ref 135–145)
WBC # BLD: 13.79 K/UL — HIGH (ref 3.8–10.5)
WBC # BLD: 13.98 K/UL — HIGH (ref 3.8–10.5)
WBC # BLD: 14.86 K/UL — HIGH (ref 3.8–10.5)
WBC # BLD: 15.04 K/UL — HIGH (ref 3.8–10.5)
WBC # BLD: 16.48 K/UL — HIGH (ref 3.8–10.5)
WBC # FLD AUTO: 13.79 K/UL — HIGH (ref 3.8–10.5)
WBC # FLD AUTO: 13.98 K/UL — HIGH (ref 3.8–10.5)
WBC # FLD AUTO: 14.86 K/UL — HIGH (ref 3.8–10.5)
WBC # FLD AUTO: 15.04 K/UL — HIGH (ref 3.8–10.5)
WBC # FLD AUTO: 16.48 K/UL — HIGH (ref 3.8–10.5)

## 2022-07-26 PROCEDURE — 99292 CRITICAL CARE ADDL 30 MIN: CPT

## 2022-07-26 PROCEDURE — 93308 TTE F-UP OR LMTD: CPT | Mod: 26

## 2022-07-26 PROCEDURE — 99291 CRITICAL CARE FIRST HOUR: CPT

## 2022-07-26 PROCEDURE — 71045 X-RAY EXAM CHEST 1 VIEW: CPT | Mod: 26

## 2022-07-26 RX ORDER — ACETAMINOPHEN 500 MG
1000 TABLET ORAL ONCE
Refills: 0 | Status: COMPLETED | OUTPATIENT
Start: 2022-07-26 | End: 2022-07-26

## 2022-07-26 RX ORDER — LANOLIN ALCOHOL/MO/W.PET/CERES
5 CREAM (GRAM) TOPICAL AT BEDTIME
Refills: 0 | Status: DISCONTINUED | OUTPATIENT
Start: 2022-07-26 | End: 2022-08-01

## 2022-07-26 RX ORDER — ALPRAZOLAM 0.25 MG
0.25 TABLET ORAL ONCE
Refills: 0 | Status: DISCONTINUED | OUTPATIENT
Start: 2022-07-26 | End: 2022-07-26

## 2022-07-26 RX ORDER — KETOROLAC TROMETHAMINE 30 MG/ML
30 SYRINGE (ML) INJECTION ONCE
Refills: 0 | Status: DISCONTINUED | OUTPATIENT
Start: 2022-07-26 | End: 2022-07-26

## 2022-07-26 RX ORDER — CALCIUM GLUCONATE 100 MG/ML
2 VIAL (ML) INTRAVENOUS ONCE
Refills: 0 | Status: COMPLETED | OUTPATIENT
Start: 2022-07-26 | End: 2022-07-26

## 2022-07-26 RX ORDER — FENTANYL CITRATE 50 UG/ML
25 INJECTION INTRAVENOUS ONCE
Refills: 0 | Status: DISCONTINUED | OUTPATIENT
Start: 2022-07-26 | End: 2022-07-26

## 2022-07-26 RX ORDER — ALBUMIN HUMAN 25 %
250 VIAL (ML) INTRAVENOUS ONCE
Refills: 0 | Status: COMPLETED | OUTPATIENT
Start: 2022-07-26 | End: 2022-07-26

## 2022-07-26 RX ADMIN — Medication 1000 MILLIGRAM(S): at 05:45

## 2022-07-26 RX ADMIN — FENTANYL CITRATE 25 MICROGRAM(S): 50 INJECTION INTRAVENOUS at 14:08

## 2022-07-26 RX ADMIN — FENTANYL CITRATE 25 MICROGRAM(S): 50 INJECTION INTRAVENOUS at 04:54

## 2022-07-26 RX ADMIN — Medication 1000 MILLIGRAM(S): at 14:12

## 2022-07-26 RX ADMIN — POLYETHYLENE GLYCOL 3350 17 GRAM(S): 17 POWDER, FOR SOLUTION ORAL at 12:01

## 2022-07-26 RX ADMIN — OXYCODONE HYDROCHLORIDE 10 MILLIGRAM(S): 5 TABLET ORAL at 03:40

## 2022-07-26 RX ADMIN — FENTANYL CITRATE 25 MICROGRAM(S): 50 INJECTION INTRAVENOUS at 22:30

## 2022-07-26 RX ADMIN — Medication 100 MILLIGRAM(S): at 16:53

## 2022-07-26 RX ADMIN — FENTANYL CITRATE 25 MICROGRAM(S): 50 INJECTION INTRAVENOUS at 05:15

## 2022-07-26 RX ADMIN — Medication 30 MILLIGRAM(S): at 07:00

## 2022-07-26 RX ADMIN — HEPARIN SODIUM 5000 UNIT(S): 5000 INJECTION INTRAVENOUS; SUBCUTANEOUS at 16:54

## 2022-07-26 RX ADMIN — PHENYLEPHRINE HYDROCHLORIDE 4.12 MICROGRAM(S)/KG/MIN: 10 INJECTION INTRAVENOUS at 22:07

## 2022-07-26 RX ADMIN — FENTANYL CITRATE 25 MICROGRAM(S): 50 INJECTION INTRAVENOUS at 15:44

## 2022-07-26 RX ADMIN — Medication 8.24 MICROGRAM(S)/KG/MIN: at 22:07

## 2022-07-26 RX ADMIN — Medication 30 MILLIGRAM(S): at 07:20

## 2022-07-26 RX ADMIN — PANTOPRAZOLE SODIUM 40 MILLIGRAM(S): 20 TABLET, DELAYED RELEASE ORAL at 07:28

## 2022-07-26 RX ADMIN — FENTANYL CITRATE 25 MICROGRAM(S): 50 INJECTION INTRAVENOUS at 00:48

## 2022-07-26 RX ADMIN — OXYCODONE HYDROCHLORIDE 10 MILLIGRAM(S): 5 TABLET ORAL at 16:42

## 2022-07-26 RX ADMIN — FENTANYL CITRATE 25 MICROGRAM(S): 50 INJECTION INTRAVENOUS at 00:32

## 2022-07-26 RX ADMIN — OXYCODONE HYDROCHLORIDE 10 MILLIGRAM(S): 5 TABLET ORAL at 04:40

## 2022-07-26 RX ADMIN — Medication 125 MILLILITER(S): at 20:49

## 2022-07-26 RX ADMIN — FENTANYL CITRATE 25 MICROGRAM(S): 50 INJECTION INTRAVENOUS at 15:09

## 2022-07-26 RX ADMIN — FENTANYL CITRATE 25 MICROGRAM(S): 50 INJECTION INTRAVENOUS at 15:15

## 2022-07-26 RX ADMIN — Medication 100 MILLIGRAM(S): at 00:27

## 2022-07-26 RX ADMIN — HEPARIN SODIUM 5000 UNIT(S): 5000 INJECTION INTRAVENOUS; SUBCUTANEOUS at 22:07

## 2022-07-26 RX ADMIN — FENTANYL CITRATE 25 MICROGRAM(S): 50 INJECTION INTRAVENOUS at 20:47

## 2022-07-26 RX ADMIN — OXYCODONE HYDROCHLORIDE 10 MILLIGRAM(S): 5 TABLET ORAL at 18:59

## 2022-07-26 RX ADMIN — ATORVASTATIN CALCIUM 40 MILLIGRAM(S): 80 TABLET, FILM COATED ORAL at 22:08

## 2022-07-26 RX ADMIN — Medication 100 MILLIGRAM(S): at 07:32

## 2022-07-26 RX ADMIN — OXYCODONE HYDROCHLORIDE 10 MILLIGRAM(S): 5 TABLET ORAL at 22:36

## 2022-07-26 RX ADMIN — Medication 81 MILLIGRAM(S): at 12:01

## 2022-07-26 RX ADMIN — Medication 0.25 MILLIGRAM(S): at 14:08

## 2022-07-26 RX ADMIN — Medication 5 MILLIGRAM(S): at 23:14

## 2022-07-26 RX ADMIN — Medication 400 MILLIGRAM(S): at 05:20

## 2022-07-26 RX ADMIN — Medication 100 GRAM(S): at 08:39

## 2022-07-26 RX ADMIN — Medication 650 MILLIGRAM(S): at 22:08

## 2022-07-26 RX ADMIN — Medication 400 MILLIGRAM(S): at 12:01

## 2022-07-26 RX ADMIN — HEPARIN SODIUM 5000 UNIT(S): 5000 INJECTION INTRAVENOUS; SUBCUTANEOUS at 06:39

## 2022-07-26 NOTE — PROGRESS NOTE ADULT - SUBJECTIVE AND OBJECTIVE BOX
CTICU  CRITICAL  CARE  attending     Hand off received 					   Pertinent clinical, laboratory, radiographic, hemodynamic, echocardiographic, respiratory data, microbiologic data and chart were reviewed and analyzed frequently throughout the course of the day  Patient seen and examined with CTS/ SH attending at bedside  57 yr old male with no sig PMH tx for CABG eval.  Sp 2 units pRBCs today. Remains on levophed. Pericardial effusion found today on TTE.     FAMILY HISTORY:  PAST MEDICAL & SURGICAL HISTORY:    57 yr old male with no sig PMH tx for CABG eval      14 system review was unremarkable    Vital signs, hemodynamic and respiratory parameters were reviewed from the bedside nursing flowsheet.  ICU Vital Signs Last 24 Hrs  T(C): 37.4 (26 Jul 2022 17:07), Max: 37.4 (26 Jul 2022 17:07)  T(F): 99.4 (26 Jul 2022 17:07), Max: 99.4 (26 Jul 2022 17:07)  HR: 98 (26 Jul 2022 20:00) (79 - 98)  BP: --  BP(mean): --  ABP: 112/52 (26 Jul 2022 20:00) (93/54 - 131/30)  ABP(mean): 68 (26 Jul 2022 20:00) (61 - 82)  RR: 19 (26 Jul 2022 09:00) (14 - 19)  SpO2: 100% (26 Jul 2022 20:00) (98% - 100%)    O2 Parameters below as of 26 Jul 2022 20:00  Patient On (Oxygen Delivery Method): nasal cannula        Intake and output was reviewed and the fluid balance was calculated  Daily     Daily   I&O's Summary    25 Jul 2022 07:01  -  26 Jul 2022 07:00  --------------------------------------------------------  IN: 2464.4 mL / OUT: 2240 mL / NET: 224.4 mL    26 Jul 2022 07:01  -  26 Jul 2022 20:30  --------------------------------------------------------  IN: 2443.2 mL / OUT: 1265 mL / NET: 1178.2 mL        All lines and drain sites were assessed  Glycemic trend was reviewed      POCT Blood Glucose.: 96 mg/dL (26 Jul 2022 19:37)      Neuro: sitting up in nad  HEENT: NC   Heart: s1, s2  Lungs: clear bl  Abdomen: soft, nt/nd  Extremities: R groin with bruising noted extending to inner thigh, marked out, soft, POCUS with no identifiable hematoma      labs  CBC Full  -  ( 26 Jul 2022 17:07 )  WBC Count : 13.98 K/uL  RBC Count : 2.91 M/uL  Hemoglobin : 8.7 g/dL  Hematocrit : 24.6 %  Platelet Count - Automated : 212 K/uL  Mean Cell Volume : 84.5 fl  Mean Cell Hemoglobin : 29.9 pg  Mean Cell Hemoglobin Concentration : 35.4 gm/dL  Auto Neutrophil # : x  Auto Lymphocyte # : x  Auto Monocyte # : x  Auto Eosinophil # : x  Auto Basophil # : x  Auto Neutrophil % : x  Auto Lymphocyte % : x  Auto Monocyte % : x  Auto Eosinophil % : x  Auto Basophil % : x    07-26    137  |  104  |  18  ----------------------------<  129<H>  4.2   |  25  |  1.19    Ca    8.3<L>      26 Jul 2022 17:07  Phos  2.9     07-26  Mg     2.1     07-26    TPro  5.1<L>  /  Alb  3.5  /  TBili  1.6<H>  /  DBili  x   /  AST  50<H>  /  ALT  23  /  AlkPhos  46  07-26    PT/INR - ( 26 Jul 2022 17:07 )   PT: 19.5 sec;   INR: 1.63          PTT - ( 26 Jul 2022 17:07 )  PTT:27.9 sec  The current medications were reviewed   MEDICATIONS  (STANDING):  albumin human  5% IVPB 250 milliLiter(s) IV Intermittent once  albumin human 25% IVPB 50 milliLiter(s) IV Intermittent every 10 minutes  aspirin  chewable 81 milliGRAM(s) Oral daily  atorvastatin 40 milliGRAM(s) Oral at bedtime  ceFAZolin   IVPB 2000 milliGRAM(s) IV Intermittent every 8 hours  dextrose 50% Injectable 50 milliLiter(s) IV Push every 15 minutes  dextrose 50% Injectable 25 milliLiter(s) IV Push every 15 minutes  heparin   Injectable 5000 Unit(s) SubCutaneous every 8 hours  insulin regular Infusion 2 Unit(s)/Hr (2 mL/Hr) IV Continuous <Continuous>  norepinephrine Infusion 0.04 MICROgram(s)/kG/Min (8.24 mL/Hr) IV Continuous <Continuous>  pantoprazole    Tablet 40 milliGRAM(s) Oral before breakfast  phenylephrine    Infusion 0.1 MICROgram(s)/kG/Min (4.12 mL/Hr) IV Continuous <Continuous>  polyethylene glycol 3350 17 Gram(s) Oral daily  sodium chloride 0.9%. 1000 milliLiter(s) (10 mL/Hr) IV Continuous <Continuous>    MEDICATIONS  (PRN):  acetaminophen     Tablet .. 650 milliGRAM(s) Oral every 6 hours PRN Mild Pain (1 - 3)  fentaNYL    Injectable 25 MICROGram(s) IV Push every 3 hours PRN Severe Pain (7 - 10)  oxyCODONE    IR 5 milliGRAM(s) Oral every 4 hours PRN Moderate Pain (4 - 6)  oxyCODONE    IR 10 milliGRAM(s) Oral every 6 hours PRN Severe Pain (7 - 10)      Assessment/Plan:  57 yr old male with no sig PMH tx for CABG eval.    Sp CABG x 4 (LIMA-LAD, SVG-PDA, SVG-OM, SVG-Dg; EF 45%, Scheinerman 7/25) with repair of femoral pseudoaneurysm likely post IABP  Vasogenic shock on levophed, wean as tolerated  Acute post operative anemia sp 2 units pRBCs, trend H/H  Pericardial effusion, loculated-monitor  RLE bruising-monitor  Aspirin  Statin  Diet as tolerated  Replete lytes prn  Monitor CT output  GI/DVT PPX  Bowel Regimen  Pain control  OOB with PT  Close hemodynamic, ventilatory and drain monitoring and management per post op routine  Monitor for arrhythmias and monitor parameters for organ perfusion  Beta blockade not administered due to hemodynamic instability and bradycardia  Monitor neurologic status  Head of the bed should remain elevated to 45 deg   Chest PT and IS will be encouraged  Monitor adequacy of oxygenation and ventilation and attempt to wean oxygen  Antibiotic regimen will be tailored to the clinical, laboratory and microbiologic data  Nutritional goals will be met using po eventually, ensure adequate caloric intake and monitor the same  Stress ulcer and VTE prophylaxis will be achieved    Glycemic control is satisfactory  Electrolytes have been repleted as necessary and wound care has been carried out   Pain control has been achieved.   Aggressive physical therapy and early mobility and ambulation goals will be met   The family was updated about the course and plan.    CRITICAL CARE TIME personally provided by me  in evaluation and management, reassessments, review and interpretation of labs and x-rays, ventilator and hemodynamic management, formulating a plan and coordinating care: ___30____ MIN.  Time does not include procedural time.    CTICU ATTENDING     					  Brea Parnell MD

## 2022-07-26 NOTE — DIETITIAN INITIAL EVALUATION ADULT - PERTINENT LABORATORY DATA
07-26    138  |  104  |  15  ----------------------------<  132<H>  4.3   |  23  |  1.13    Ca    8.1<L>      26 Jul 2022 06:14  Phos  4.3     07-26  Mg     2.2     07-26    TPro  5.1<L>  /  Alb  3.0<L>  /  TBili  0.7  /  DBili  x   /  AST  50<H>  /  ALT  25  /  AlkPhos  45  07-26  POCT Blood Glucose.: 139 mg/dL (07-26-22 @ 10:49)  A1C with Estimated Average Glucose Result: 5.1 % (07-21-22 @ 11:35)

## 2022-07-26 NOTE — PROGRESS NOTE ADULT - SUBJECTIVE AND OBJECTIVE BOX
SUBJECTIVE: Patient seen at bedside in no distress, complaining of some pain from chest incision. No SOB, fevers or chills.    Vital Signs Last 24 Hrs  T(C): 36.4 (26 Jul 2022 04:57), Max: 36.4 (26 Jul 2022 04:57)  T(F): 97.5 (26 Jul 2022 04:57), Max: 97.5 (26 Jul 2022 04:57)  HR: 87 (26 Jul 2022 06:00) (82 - 99)  BP: --  BP(mean): --  RR: 16 (26 Jul 2022 06:00) (12 - 18)  SpO2: 100% (26 Jul 2022 06:00) (98% - 100%)    Parameters below as of 26 Jul 2022 06:00  Patient On (Oxygen Delivery Method): nasal cannula w/ humidification  O2 Flow (L/min): 2      Gen: NAD, resting comfortably in bed  C/V: NSR, chest incision clean dry and intact  Pulm: Nonlabored breathing, no respiratory distress  Abd: soft, NT/ND  Extrem: WWP, no calf edema, SCDs in place. R fem cutdown soft with some ecchymosis, dressing C/D/I, ASHU x2 are minimal serosanguinous output  Vascular: palpable DP + PT bilaterally   Neuro: Sensory and motor function intact        Lines/drains/tubes:    I&O's Summary    24 Jul 2022 07:01  -  25 Jul 2022 07:00  --------------------------------------------------------  IN: 165.5 mL / OUT: 975 mL / NET: -809.5 mL    25 Jul 2022 07:01  -  26 Jul 2022 06:53  --------------------------------------------------------  IN: 1829.9 mL / OUT: 2025 mL / NET: -195.1 mL        LABS:                        8.2    15.04 )-----------( 268      ( 26 Jul 2022 05:16 )             23.7     07-26    138  |  104  |  15  ----------------------------<  146<H>  4.8   |  22  |  1.15    Ca    8.4      26 Jul 2022 01:14  Phos  4.3     07-26  Mg     2.2     07-26    TPro  5.3<L>  /  Alb  3.4  /  TBili  0.9  /  DBili  x   /  AST  58<H>  /  ALT  28  /  AlkPhos  46  07-26    PT/INR - ( 26 Jul 2022 01:14 )   PT: 18.1 sec;   INR: 1.51          PTT - ( 26 Jul 2022 01:14 )  PTT:27.8 sec    CAPILLARY BLOOD GLUCOSE      POCT Blood Glucose.: 122 mg/dL (26 Jul 2022 06:06)  POCT Blood Glucose.: 123 mg/dL (26 Jul 2022 05:07)  POCT Blood Glucose.: 114 mg/dL (26 Jul 2022 04:07)  POCT Blood Glucose.: 123 mg/dL (26 Jul 2022 03:01)  POCT Blood Glucose.: 138 mg/dL (26 Jul 2022 02:03)  POCT Blood Glucose.: 135 mg/dL (26 Jul 2022 01:06)  POCT Blood Glucose.: 128 mg/dL (26 Jul 2022 00:29)  POCT Blood Glucose.: 159 mg/dL (25 Jul 2022 23:04)  POCT Blood Glucose.: 154 mg/dL (25 Jul 2022 22:30)  POCT Blood Glucose.: 168 mg/dL (25 Jul 2022 21:04)  POCT Blood Glucose.: 166 mg/dL (25 Jul 2022 20:22)    LIVER FUNCTIONS - ( 26 Jul 2022 01:14 )  Alb: 3.4 g/dL / Pro: 5.3 g/dL / ALK PHOS: 46 U/L / ALT: 28 U/L / AST: 58 U/L / GGT: x             RADIOLOGY & ADDITIONAL STUDIES:

## 2022-07-26 NOTE — PHYSICAL THERAPY INITIAL EVALUATION ADULT - GENERAL OBSERVATIONS, REHAB EVAL
patient received seated out of bed with no acute distress. +EKG +ivania +central line +hammad to wall suction +chest tube to wall suction +johnson  +temporary pacemaker +SCDs patient received seated out of bed with no acute distress. +EKG +ivania +central line +hammad to self suction right LE x 2 +chest tube to wall suction +johnson  +temporary pacemaker +SCDs +left LE ace wrap clean/dry/intact +right groin dressing clean/dry/intact +NC

## 2022-07-26 NOTE — DIETITIAN INITIAL EVALUATION ADULT - OTHER INFO
58 y/o M w/ CAD s/p recent MI s/p CABG and R groin cutdown for pseudoaneurysm repair.      Pt seen at bedside for initial assessment-on NC w/ humidification and pressor support (Levo, phenylephrine)- MAP 63. On insulin drip. Last documented bowel movement 7/21. Confirms NKFA. Denies change in appetite PTA. Reports current body weight 242 pounds (consistent with dosing weight 242 pounds). Reports  pounds; notes 15 pound weight loss (reports intentional weight loss). No edema documented at this time. No pressure ulcers documented at this time. Surgical incisions per chart. Amor score=20. Labs reviewed 7/26; electrolytes within normal limits at this time. Fingersticks 7/25-7/25: 113-138 mg/dL; Insulin drip ordered. Observed pt with no overt signs of muscle or fat wasting. Based on ASPEN guidelines, pt does not meet criteria for malnutrition at this time. Provided pt with diet education regarding importance of meeting nutritional needs post operatively ; amenable to education. Discussed current diet order low sodium CTSCHO; provided diet education, discussed sources of high versus low fat, CHO and low sodium foods.  Pt reports not feeling hungry during hospital stay, able to complete >50% of meals. No cultural, ethnic, Cheondoism food preferences noted. Made aware RD remains available. RD to follow up per protocol. See nutrition recommendations below.  Xerosis Normal Treatment: I recommended application of Cetaphil or CeraVe numerous times a day going to bed to all dry areas.

## 2022-07-26 NOTE — DIETITIAN INITIAL EVALUATION ADULT - PERTINENT MEDS FT
MEDICATIONS  (STANDING):  acetaminophen   IVPB .. 1000 milliGRAM(s) IV Intermittent once  albumin human 25% IVPB 50 milliLiter(s) IV Intermittent every 10 minutes  aspirin  chewable 81 milliGRAM(s) Oral daily  atorvastatin 40 milliGRAM(s) Oral at bedtime  ceFAZolin   IVPB 2000 milliGRAM(s) IV Intermittent every 8 hours  dextrose 50% Injectable 50 milliLiter(s) IV Push every 15 minutes  dextrose 50% Injectable 25 milliLiter(s) IV Push every 15 minutes  heparin   Injectable 5000 Unit(s) SubCutaneous every 8 hours  insulin regular Infusion 2 Unit(s)/Hr (2 mL/Hr) IV Continuous <Continuous>  norepinephrine Infusion 0.04 MICROgram(s)/kG/Min (8.24 mL/Hr) IV Continuous <Continuous>  pantoprazole    Tablet 40 milliGRAM(s) Oral before breakfast  phenylephrine    Infusion 0.1 MICROgram(s)/kG/Min (4.12 mL/Hr) IV Continuous <Continuous>  polyethylene glycol 3350 17 Gram(s) Oral daily  sodium chloride 0.9%. 1000 milliLiter(s) (10 mL/Hr) IV Continuous <Continuous>    MEDICATIONS  (PRN):  acetaminophen     Tablet .. 650 milliGRAM(s) Oral every 6 hours PRN Mild Pain (1 - 3)  fentaNYL    Injectable 25 MICROGram(s) IV Push every 3 hours PRN Severe Pain (7 - 10)  oxyCODONE    IR 5 milliGRAM(s) Oral every 4 hours PRN Moderate Pain (4 - 6)  oxyCODONE    IR 10 milliGRAM(s) Oral every 6 hours PRN Severe Pain (7 - 10)

## 2022-07-26 NOTE — PROGRESS NOTE ADULT - SUBJECTIVE AND OBJECTIVE BOX
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  ICU Vital Signs Last 24 Hrs  T(C): 36.3 (26 Jul 2022 14:34), Max: 36.4 (26 Jul 2022 04:57)  T(F): 97.3 (26 Jul 2022 14:34), Max: 97.5 (26 Jul 2022 04:57)  HR: 84 (26 Jul 2022 16:00) (79 - 99)  BP: --  BP(mean): --  ABP: 127/49 (26 Jul 2022 16:00) (93/54 - 131/30)  ABP(mean): 68 (26 Jul 2022 16:00) (63 - 82)  RR: 19 (26 Jul 2022 09:00) (12 - 19)  SpO2: 100% (26 Jul 2022 16:00) (98% - 100%)    O2 Parameters below as of 26 Jul 2022 16:00  Patient On (Oxygen Delivery Method): nasal cannula  O2 Flow (L/min): 2        Adult Advanced Hemodynamics Last 24 Hrs  CVP(mm Hg): 9 (26 Jul 2022 16:00) (5 - 337)  CVP(cm H2O): --  CO: --  CI: --  PA: --  PA(mean): --  PCWP: --  SVR: --  SVRI: --  PVR: --  PVRI: --, ABG - ( 26 Jul 2022 11:52 )  pH, Arterial: 7.48  pH, Blood: x     /  pCO2: 33    /  pO2: 97    / HCO3: 25    / Base Excess: 1.6   /  SaO2: 99.2              Mode: AC/ CMV (Assist Control/ Continuous Mandatory Ventilation)  RR (machine): 12  TV (machine): 700  FiO2: 40  PEEP: 5  ITime: 1  MAP: 11  PIP: 23    Intake and output was reviewed and the fluid balance was calculated  Daily     Daily   I&O's Summary    25 Jul 2022 07:01  -  26 Jul 2022 07:00  --------------------------------------------------------  IN: 2464.4 mL / OUT: 2240 mL / NET: 224.4 mL    26 Jul 2022 07:01  -  26 Jul 2022 16:35  --------------------------------------------------------  IN: 1575.5 mL / OUT: 740 mL / NET: 835.5 mL        All lines and drain sites were assessed  [facility]      POCT Blood Glucose.: 118 mg/dL (26 Jul 2022 16:12)    No acute change in mental status  Auscultation of the chest reveals equal bs  Abdomen is soft  Extremities are warm and well perfused  Wounds appear clean and unremarkable  Antibiotics are periop    labs  CBC Full  -  ( 26 Jul 2022 11:51 )  WBC Count : 14.86 K/uL  RBC Count : 2.76 M/uL  Hemoglobin : 8.2 g/dL  Hematocrit : 23.5 %  Platelet Count - Automated : 240 K/uL  Mean Cell Volume : 85.1 fl  Mean Cell Hemoglobin : 29.7 pg  Mean Cell Hemoglobin Concentration : 34.9 gm/dL  Auto Neutrophil # : x  Auto Lymphocyte # : x  Auto Monocyte # : x  Auto Eosinophil # : x  Auto Basophil # : x  Auto Neutrophil % : x  Auto Lymphocyte % : x  Auto Monocyte % : x  Auto Eosinophil % : x  Auto Basophil % : x    07-26    136  |  103  |  17  ----------------------------<  141<H>  4.2   |  24  |  1.21    Ca    8.2<L>      26 Jul 2022 11:51  Phos  3.5     07-26  Mg     2.0     07-26    TPro  5.1<L>  /  Alb  3.5  /  TBili  0.9  /  DBili  x   /  AST  44<H>  /  ALT  24  /  AlkPhos  41  07-26    PT/INR - ( 26 Jul 2022 11:51 )   PT: 19.8 sec;   INR: 1.66          PTT - ( 26 Jul 2022 11:51 )  PTT:26.5 sec  The current medications were reviewed   MEDICATIONS  (STANDING):  albumin human 25% IVPB 50 milliLiter(s) IV Intermittent every 10 minutes  aspirin  chewable 81 milliGRAM(s) Oral daily  atorvastatin 40 milliGRAM(s) Oral at bedtime  ceFAZolin   IVPB 2000 milliGRAM(s) IV Intermittent every 8 hours  dextrose 50% Injectable 50 milliLiter(s) IV Push every 15 minutes  dextrose 50% Injectable 25 milliLiter(s) IV Push every 15 minutes  heparin   Injectable 5000 Unit(s) SubCutaneous every 8 hours  insulin regular Infusion 2 Unit(s)/Hr (2 mL/Hr) IV Continuous <Continuous>  norepinephrine Infusion 0.04 MICROgram(s)/kG/Min (8.24 mL/Hr) IV Continuous <Continuous>  pantoprazole    Tablet 40 milliGRAM(s) Oral before breakfast  phenylephrine    Infusion 0.1 MICROgram(s)/kG/Min (4.12 mL/Hr) IV Continuous <Continuous>  polyethylene glycol 3350 17 Gram(s) Oral daily  sodium chloride 0.9%. 1000 milliLiter(s) (10 mL/Hr) IV Continuous <Continuous>    MEDICATIONS  (PRN):  acetaminophen     Tablet .. 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   chest PT and IS will be encouraged  monitor adequacy of oxygenation and ventilation and attempt to wean oxygen  Nutritional goals will be met using po eventually , ensure adequate caloric intake and montior the same  Stress ulcer and VTE prophylaxis will be achieved    Glycemic control is satisfactory  Electrolytes have been repleted as necessary and wound care has been carried out. Pain control has been achieved.   agressive physical therapy and early mobility and ambulation goals will be met   The family was updated about the course and plan  CRITICAL CARE TIME SPENT in evaluation and management, reassessments, review and interpretation of labs and x-rays, ventilator and hemodynamic management, formulating a plan and coordinating care: ___90____ MIN.  Time does not include procedural time.  CTICU ATTENDING     					    Taqueria Quintero MD

## 2022-07-26 NOTE — DIETITIAN INITIAL EVALUATION ADULT - NSFNSGIIOFT_GEN_A_CORE
07-25-22 @ 07:01  -  07-26-22 @ 07:00  --------------------------------------------------------  OUT:    Chest Tube (mL): 225 mL    Chest Tube (mL): 440 mL    Nasogastric/Oral tube (mL): 0 mL  Total OUT: 665 mL    Total NET: -665 mL      07-26-22 @ 07:01  -  07-26-22 @ 12:01  --------------------------------------------------------  OUT:    Chest Tube (mL): 25 mL  Total OUT: 25 mL    Total NET: -25 mL

## 2022-07-26 NOTE — PHYSICAL THERAPY INITIAL EVALUATION ADULT - PERTINENT HX OF CURRENT PROBLEM, REHAB EVAL
57 year old male  initially presented to [facility] ED on 7/19 with exertional chest pain X1 week, relieved with rest. In the ED patient noted to have elevated troponin and was ruled in for NSTEMI. Cath revealed 3VCAD: LAD 90%, D1 80%, LCx 90%, OM1 100% thrombotic occlusion with collaterals from RCA, RPDA 80%. IABP was placed and patient was brought to the ICU post cath. IABP was removed the next day and decision made to transfer to [facility] under the care of Dr. Bran for surgical evaluation.

## 2022-07-26 NOTE — DIETITIAN INITIAL EVALUATION ADULT - ADD RECOMMEND
1. Continue with current diet order (monitor need for ONS) 2. Encourage pt to meet nutritional needs as able 3. Encourage adherence to diet education (reinforce as able) 4. Pain and bowel regimen per team 5. Will continue to assess/honor preferences as able

## 2022-07-26 NOTE — PROGRESS NOTE ADULT - ASSESSMENT
58 y/o M w/ CAD s/p recent MI s/p CABG and R groin cutdown for pseudoaneurysm repair.      Plan:   Patient may get out of bed and ambulate as tolerated  Monitor R groin wound/ASHU drains for signs of hematoma  Rest of care per CTICU team  Vascular will continue to follow.

## 2022-07-27 LAB
ALBUMIN SERPL ELPH-MCNC: 3 G/DL — LOW (ref 3.3–5)
ALBUMIN SERPL ELPH-MCNC: 3.2 G/DL — LOW (ref 3.3–5)
ALBUMIN SERPL ELPH-MCNC: 3.2 G/DL — LOW (ref 3.3–5)
ALBUMIN SERPL ELPH-MCNC: 3.3 G/DL — SIGNIFICANT CHANGE UP (ref 3.3–5)
ALP SERPL-CCNC: 44 U/L — SIGNIFICANT CHANGE UP (ref 40–120)
ALP SERPL-CCNC: 48 U/L — SIGNIFICANT CHANGE UP (ref 40–120)
ALP SERPL-CCNC: 50 U/L — SIGNIFICANT CHANGE UP (ref 40–120)
ALP SERPL-CCNC: 51 U/L — SIGNIFICANT CHANGE UP (ref 40–120)
ALT FLD-CCNC: 17 U/L — SIGNIFICANT CHANGE UP (ref 10–45)
ALT FLD-CCNC: 19 U/L — SIGNIFICANT CHANGE UP (ref 10–45)
ANION GAP SERPL CALC-SCNC: 11 MMOL/L — SIGNIFICANT CHANGE UP (ref 5–17)
ANION GAP SERPL CALC-SCNC: 13 MMOL/L — SIGNIFICANT CHANGE UP (ref 5–17)
ANION GAP SERPL CALC-SCNC: 13 MMOL/L — SIGNIFICANT CHANGE UP (ref 5–17)
ANION GAP SERPL CALC-SCNC: 14 MMOL/L — SIGNIFICANT CHANGE UP (ref 5–17)
APTT BLD: 27.2 SEC — LOW (ref 27.5–35.5)
APTT BLD: 27.3 SEC — LOW (ref 27.5–35.5)
APTT BLD: 28.9 SEC — SIGNIFICANT CHANGE UP (ref 27.5–35.5)
APTT BLD: 29.1 SEC — SIGNIFICANT CHANGE UP (ref 27.5–35.5)
AST SERPL-CCNC: 41 U/L — HIGH (ref 10–40)
AST SERPL-CCNC: 44 U/L — HIGH (ref 10–40)
AST SERPL-CCNC: 46 U/L — HIGH (ref 10–40)
AST SERPL-CCNC: 47 U/L — HIGH (ref 10–40)
BASE EXCESS BLDV CALC-SCNC: 3.2 MMOL/L — HIGH (ref -2–3)
BILIRUB SERPL-MCNC: 0.9 MG/DL — SIGNIFICANT CHANGE UP (ref 0.2–1.2)
BILIRUB SERPL-MCNC: 0.9 MG/DL — SIGNIFICANT CHANGE UP (ref 0.2–1.2)
BILIRUB SERPL-MCNC: 1.3 MG/DL — HIGH (ref 0.2–1.2)
BILIRUB SERPL-MCNC: 1.5 MG/DL — HIGH (ref 0.2–1.2)
BUN SERPL-MCNC: 18 MG/DL — SIGNIFICANT CHANGE UP (ref 7–23)
BUN SERPL-MCNC: 19 MG/DL — SIGNIFICANT CHANGE UP (ref 7–23)
BUN SERPL-MCNC: 20 MG/DL — SIGNIFICANT CHANGE UP (ref 7–23)
BUN SERPL-MCNC: 20 MG/DL — SIGNIFICANT CHANGE UP (ref 7–23)
CA-I SERPL-SCNC: 1.14 MMOL/L — LOW (ref 1.15–1.33)
CALCIUM SERPL-MCNC: 8 MG/DL — LOW (ref 8.4–10.5)
CALCIUM SERPL-MCNC: 8.4 MG/DL — SIGNIFICANT CHANGE UP (ref 8.4–10.5)
CALCIUM SERPL-MCNC: 8.4 MG/DL — SIGNIFICANT CHANGE UP (ref 8.4–10.5)
CALCIUM SERPL-MCNC: 8.7 MG/DL — SIGNIFICANT CHANGE UP (ref 8.4–10.5)
CHLORIDE SERPL-SCNC: 103 MMOL/L — SIGNIFICANT CHANGE UP (ref 96–108)
CHLORIDE SERPL-SCNC: 97 MMOL/L — SIGNIFICANT CHANGE UP (ref 96–108)
CHLORIDE SERPL-SCNC: 98 MMOL/L — SIGNIFICANT CHANGE UP (ref 96–108)
CHLORIDE SERPL-SCNC: 99 MMOL/L — SIGNIFICANT CHANGE UP (ref 96–108)
CO2 BLDV-SCNC: 29.2 MMOL/L — HIGH (ref 22–26)
CO2 SERPL-SCNC: 20 MMOL/L — LOW (ref 22–31)
CO2 SERPL-SCNC: 22 MMOL/L — SIGNIFICANT CHANGE UP (ref 22–31)
CO2 SERPL-SCNC: 23 MMOL/L — SIGNIFICANT CHANGE UP (ref 22–31)
CO2 SERPL-SCNC: 24 MMOL/L — SIGNIFICANT CHANGE UP (ref 22–31)
CORTIS AM PEAK SERPL-MCNC: 25.19 UG/DL — HIGH (ref 6.02–18.4)
CREAT SERPL-MCNC: 1.18 MG/DL — SIGNIFICANT CHANGE UP (ref 0.5–1.3)
CREAT SERPL-MCNC: 1.2 MG/DL — SIGNIFICANT CHANGE UP (ref 0.5–1.3)
CREAT SERPL-MCNC: 1.29 MG/DL — SIGNIFICANT CHANGE UP (ref 0.5–1.3)
CREAT SERPL-MCNC: 1.29 MG/DL — SIGNIFICANT CHANGE UP (ref 0.5–1.3)
EGFR: 65 ML/MIN/1.73M2 — SIGNIFICANT CHANGE UP
EGFR: 65 ML/MIN/1.73M2 — SIGNIFICANT CHANGE UP
EGFR: 71 ML/MIN/1.73M2 — SIGNIFICANT CHANGE UP
EGFR: 73 ML/MIN/1.73M2 — SIGNIFICANT CHANGE UP
GAS PNL BLDA: SIGNIFICANT CHANGE UP
GAS PNL BLDV: 133 MMOL/L — LOW (ref 136–145)
GAS PNL BLDV: SIGNIFICANT CHANGE UP
GLUCOSE BLDC GLUCOMTR-MCNC: 136 MG/DL — HIGH (ref 70–99)
GLUCOSE BLDC GLUCOMTR-MCNC: 146 MG/DL — HIGH (ref 70–99)
GLUCOSE BLDC GLUCOMTR-MCNC: 148 MG/DL — HIGH (ref 70–99)
GLUCOSE BLDC GLUCOMTR-MCNC: 162 MG/DL — HIGH (ref 70–99)
GLUCOSE BLDC GLUCOMTR-MCNC: 95 MG/DL — SIGNIFICANT CHANGE UP (ref 70–99)
GLUCOSE SERPL-MCNC: 139 MG/DL — HIGH (ref 70–99)
GLUCOSE SERPL-MCNC: 150 MG/DL — HIGH (ref 70–99)
GLUCOSE SERPL-MCNC: 162 MG/DL — HIGH (ref 70–99)
GLUCOSE SERPL-MCNC: 195 MG/DL — HIGH (ref 70–99)
HCO3 BLDV-SCNC: 28 MMOL/L — SIGNIFICANT CHANGE UP (ref 22–29)
HCT VFR BLD CALC: 23.8 % — LOW (ref 39–50)
HCT VFR BLD CALC: 26.7 % — LOW (ref 39–50)
HCT VFR BLD CALC: 27.2 % — LOW (ref 39–50)
HCT VFR BLD CALC: 28 % — LOW (ref 39–50)
HGB BLD-MCNC: 8.3 G/DL — LOW (ref 13–17)
HGB BLD-MCNC: 9.1 G/DL — LOW (ref 13–17)
HGB BLD-MCNC: 9.3 G/DL — LOW (ref 13–17)
HGB BLD-MCNC: 9.6 G/DL — LOW (ref 13–17)
INR BLD: 1.62 — HIGH (ref 0.88–1.16)
INR BLD: 1.64 — HIGH (ref 0.88–1.16)
INR BLD: 1.67 — HIGH (ref 0.88–1.16)
INR BLD: 1.72 — HIGH (ref 0.88–1.16)
LACTATE SERPL-SCNC: 1.3 MMOL/L — SIGNIFICANT CHANGE UP (ref 0.5–2)
MAGNESIUM SERPL-MCNC: 2 MG/DL — SIGNIFICANT CHANGE UP (ref 1.6–2.6)
MAGNESIUM SERPL-MCNC: 2 MG/DL — SIGNIFICANT CHANGE UP (ref 1.6–2.6)
MAGNESIUM SERPL-MCNC: 2.1 MG/DL — SIGNIFICANT CHANGE UP (ref 1.6–2.6)
MAGNESIUM SERPL-MCNC: 2.2 MG/DL — SIGNIFICANT CHANGE UP (ref 1.6–2.6)
MCHC RBC-ENTMCNC: 28.9 PG — SIGNIFICANT CHANGE UP (ref 27–34)
MCHC RBC-ENTMCNC: 29.1 PG — SIGNIFICANT CHANGE UP (ref 27–34)
MCHC RBC-ENTMCNC: 29.3 PG — SIGNIFICANT CHANGE UP (ref 27–34)
MCHC RBC-ENTMCNC: 29.5 PG — SIGNIFICANT CHANGE UP (ref 27–34)
MCHC RBC-ENTMCNC: 34.1 GM/DL — SIGNIFICANT CHANGE UP (ref 32–36)
MCHC RBC-ENTMCNC: 34.2 GM/DL — SIGNIFICANT CHANGE UP (ref 32–36)
MCHC RBC-ENTMCNC: 34.3 GM/DL — SIGNIFICANT CHANGE UP (ref 32–36)
MCHC RBC-ENTMCNC: 34.9 GM/DL — SIGNIFICANT CHANGE UP (ref 32–36)
MCV RBC AUTO: 84.5 FL — SIGNIFICANT CHANGE UP (ref 80–100)
MCV RBC AUTO: 84.7 FL — SIGNIFICANT CHANGE UP (ref 80–100)
MCV RBC AUTO: 84.8 FL — SIGNIFICANT CHANGE UP (ref 80–100)
MCV RBC AUTO: 85.9 FL — SIGNIFICANT CHANGE UP (ref 80–100)
NRBC # BLD: 0 /100 WBCS — SIGNIFICANT CHANGE UP (ref 0–0)
PCO2 BLDV: 42 MMHG — SIGNIFICANT CHANGE UP (ref 42–55)
PH BLDV: 7.43 — SIGNIFICANT CHANGE UP (ref 7.32–7.43)
PHOSPHATE SERPL-MCNC: 2.4 MG/DL — LOW (ref 2.5–4.5)
PHOSPHATE SERPL-MCNC: 2.5 MG/DL — SIGNIFICANT CHANGE UP (ref 2.5–4.5)
PHOSPHATE SERPL-MCNC: 3.2 MG/DL — SIGNIFICANT CHANGE UP (ref 2.5–4.5)
PHOSPHATE SERPL-MCNC: 3.3 MG/DL — SIGNIFICANT CHANGE UP (ref 2.5–4.5)
PLATELET # BLD AUTO: 187 K/UL — SIGNIFICANT CHANGE UP (ref 150–400)
PLATELET # BLD AUTO: 205 K/UL — SIGNIFICANT CHANGE UP (ref 150–400)
PLATELET # BLD AUTO: 210 K/UL — SIGNIFICANT CHANGE UP (ref 150–400)
PLATELET # BLD AUTO: 212 K/UL — SIGNIFICANT CHANGE UP (ref 150–400)
PO2 BLDV: 37 MMHG — SIGNIFICANT CHANGE UP (ref 25–45)
POTASSIUM BLDV-SCNC: 4.1 MMOL/L — SIGNIFICANT CHANGE UP (ref 3.5–5.1)
POTASSIUM SERPL-MCNC: 4 MMOL/L — SIGNIFICANT CHANGE UP (ref 3.5–5.3)
POTASSIUM SERPL-MCNC: 4.1 MMOL/L — SIGNIFICANT CHANGE UP (ref 3.5–5.3)
POTASSIUM SERPL-MCNC: 4.3 MMOL/L — SIGNIFICANT CHANGE UP (ref 3.5–5.3)
POTASSIUM SERPL-MCNC: 4.6 MMOL/L — SIGNIFICANT CHANGE UP (ref 3.5–5.3)
POTASSIUM SERPL-SCNC: 4 MMOL/L — SIGNIFICANT CHANGE UP (ref 3.5–5.3)
POTASSIUM SERPL-SCNC: 4.1 MMOL/L — SIGNIFICANT CHANGE UP (ref 3.5–5.3)
POTASSIUM SERPL-SCNC: 4.3 MMOL/L — SIGNIFICANT CHANGE UP (ref 3.5–5.3)
POTASSIUM SERPL-SCNC: 4.6 MMOL/L — SIGNIFICANT CHANGE UP (ref 3.5–5.3)
PROT SERPL-MCNC: 5.3 G/DL — LOW (ref 6–8.3)
PROT SERPL-MCNC: 5.6 G/DL — LOW (ref 6–8.3)
PROT SERPL-MCNC: 5.8 G/DL — LOW (ref 6–8.3)
PROT SERPL-MCNC: 5.9 G/DL — LOW (ref 6–8.3)
PROTHROM AB SERPL-ACNC: 19.4 SEC — HIGH (ref 10.5–13.4)
PROTHROM AB SERPL-ACNC: 19.6 SEC — HIGH (ref 10.5–13.4)
PROTHROM AB SERPL-ACNC: 20 SEC — HIGH (ref 10.5–13.4)
PROTHROM AB SERPL-ACNC: 20.6 SEC — HIGH (ref 10.5–13.4)
RBC # BLD: 2.81 M/UL — LOW (ref 4.2–5.8)
RBC # BLD: 3.11 M/UL — LOW (ref 4.2–5.8)
RBC # BLD: 3.22 M/UL — LOW (ref 4.2–5.8)
RBC # BLD: 3.3 M/UL — LOW (ref 4.2–5.8)
RBC # FLD: 15.1 % — HIGH (ref 10.3–14.5)
RBC # FLD: 15.1 % — HIGH (ref 10.3–14.5)
RBC # FLD: 15.5 % — HIGH (ref 10.3–14.5)
RBC # FLD: 15.5 % — HIGH (ref 10.3–14.5)
SAO2 % BLDV: 66.9 % — LOW (ref 67–88)
SODIUM SERPL-SCNC: 131 MMOL/L — LOW (ref 135–145)
SODIUM SERPL-SCNC: 134 MMOL/L — LOW (ref 135–145)
SODIUM SERPL-SCNC: 134 MMOL/L — LOW (ref 135–145)
SODIUM SERPL-SCNC: 138 MMOL/L — SIGNIFICANT CHANGE UP (ref 135–145)
WBC # BLD: 13.24 K/UL — HIGH (ref 3.8–10.5)
WBC # BLD: 13.37 K/UL — HIGH (ref 3.8–10.5)
WBC # BLD: 14.87 K/UL — HIGH (ref 3.8–10.5)
WBC # BLD: 14.99 K/UL — HIGH (ref 3.8–10.5)
WBC # FLD AUTO: 13.24 K/UL — HIGH (ref 3.8–10.5)
WBC # FLD AUTO: 13.37 K/UL — HIGH (ref 3.8–10.5)
WBC # FLD AUTO: 14.87 K/UL — HIGH (ref 3.8–10.5)
WBC # FLD AUTO: 14.99 K/UL — HIGH (ref 3.8–10.5)

## 2022-07-27 PROCEDURE — 99292 CRITICAL CARE ADDL 30 MIN: CPT

## 2022-07-27 PROCEDURE — 71045 X-RAY EXAM CHEST 1 VIEW: CPT | Mod: 26

## 2022-07-27 PROCEDURE — 93308 TTE F-UP OR LMTD: CPT | Mod: 26

## 2022-07-27 PROCEDURE — 99232 SBSQ HOSP IP/OBS MODERATE 35: CPT

## 2022-07-27 PROCEDURE — 99291 CRITICAL CARE FIRST HOUR: CPT

## 2022-07-27 RX ORDER — SODIUM CHLORIDE 9 MG/ML
1000 INJECTION, SOLUTION INTRAVENOUS
Refills: 0 | Status: DISCONTINUED | OUTPATIENT
Start: 2022-07-27 | End: 2022-08-01

## 2022-07-27 RX ORDER — DEXTROSE 50 % IN WATER 50 %
25 SYRINGE (ML) INTRAVENOUS ONCE
Refills: 0 | Status: DISCONTINUED | OUTPATIENT
Start: 2022-07-27 | End: 2022-08-01

## 2022-07-27 RX ORDER — POTASSIUM CHLORIDE 20 MEQ
20 PACKET (EA) ORAL
Refills: 0 | Status: COMPLETED | OUTPATIENT
Start: 2022-07-27 | End: 2022-07-27

## 2022-07-27 RX ORDER — CALCIUM GLUCONATE 100 MG/ML
2 VIAL (ML) INTRAVENOUS ONCE
Refills: 0 | Status: COMPLETED | OUTPATIENT
Start: 2022-07-27 | End: 2022-07-27

## 2022-07-27 RX ORDER — GLUCAGON INJECTION, SOLUTION 0.5 MG/.1ML
1 INJECTION, SOLUTION SUBCUTANEOUS ONCE
Refills: 0 | Status: DISCONTINUED | OUTPATIENT
Start: 2022-07-27 | End: 2022-08-01

## 2022-07-27 RX ORDER — METOPROLOL TARTRATE 50 MG
12.5 TABLET ORAL EVERY 6 HOURS
Refills: 0 | Status: DISCONTINUED | OUTPATIENT
Start: 2022-07-27 | End: 2022-07-29

## 2022-07-27 RX ORDER — INSULIN LISPRO 100/ML
VIAL (ML) SUBCUTANEOUS
Refills: 0 | Status: DISCONTINUED | OUTPATIENT
Start: 2022-07-27 | End: 2022-08-01

## 2022-07-27 RX ORDER — FUROSEMIDE 40 MG
20 TABLET ORAL ONCE
Refills: 0 | Status: COMPLETED | OUTPATIENT
Start: 2022-07-27 | End: 2022-07-27

## 2022-07-27 RX ORDER — GABAPENTIN 400 MG/1
300 CAPSULE ORAL EVERY 8 HOURS
Refills: 0 | Status: DISCONTINUED | OUTPATIENT
Start: 2022-07-27 | End: 2022-08-01

## 2022-07-27 RX ORDER — DEXTROSE 50 % IN WATER 50 %
15 SYRINGE (ML) INTRAVENOUS ONCE
Refills: 0 | Status: DISCONTINUED | OUTPATIENT
Start: 2022-07-27 | End: 2022-08-01

## 2022-07-27 RX ORDER — METOPROLOL TARTRATE 50 MG
12.5 TABLET ORAL EVERY 12 HOURS
Refills: 0 | Status: DISCONTINUED | OUTPATIENT
Start: 2022-07-27 | End: 2022-07-27

## 2022-07-27 RX ORDER — ACETAMINOPHEN 500 MG
1000 TABLET ORAL ONCE
Refills: 0 | Status: COMPLETED | OUTPATIENT
Start: 2022-07-27 | End: 2022-07-27

## 2022-07-27 RX ORDER — DEXTROSE 50 % IN WATER 50 %
12.5 SYRINGE (ML) INTRAVENOUS ONCE
Refills: 0 | Status: DISCONTINUED | OUTPATIENT
Start: 2022-07-27 | End: 2022-08-01

## 2022-07-27 RX ADMIN — Medication 12.5 MILLIGRAM(S): at 16:39

## 2022-07-27 RX ADMIN — Medication 12.5 MILLIGRAM(S): at 21:40

## 2022-07-27 RX ADMIN — Medication 100 MILLIGRAM(S): at 00:00

## 2022-07-27 RX ADMIN — Medication 62.5 MILLIMOLE(S): at 00:30

## 2022-07-27 RX ADMIN — POLYETHYLENE GLYCOL 3350 17 GRAM(S): 17 POWDER, FOR SOLUTION ORAL at 11:21

## 2022-07-27 RX ADMIN — HEPARIN SODIUM 5000 UNIT(S): 5000 INJECTION INTRAVENOUS; SUBCUTANEOUS at 15:49

## 2022-07-27 RX ADMIN — OXYCODONE HYDROCHLORIDE 10 MILLIGRAM(S): 5 TABLET ORAL at 07:45

## 2022-07-27 RX ADMIN — HEPARIN SODIUM 5000 UNIT(S): 5000 INJECTION INTRAVENOUS; SUBCUTANEOUS at 21:40

## 2022-07-27 RX ADMIN — FENTANYL CITRATE 25 MICROGRAM(S): 50 INJECTION INTRAVENOUS at 21:41

## 2022-07-27 RX ADMIN — Medication 100 MILLIEQUIVALENT(S): at 13:49

## 2022-07-27 RX ADMIN — Medication 62.5 MILLIMOLE(S): at 22:32

## 2022-07-27 RX ADMIN — OXYCODONE HYDROCHLORIDE 10 MILLIGRAM(S): 5 TABLET ORAL at 07:02

## 2022-07-27 RX ADMIN — HEPARIN SODIUM 5000 UNIT(S): 5000 INJECTION INTRAVENOUS; SUBCUTANEOUS at 07:02

## 2022-07-27 RX ADMIN — Medication 5 MILLIGRAM(S): at 21:41

## 2022-07-27 RX ADMIN — OXYCODONE HYDROCHLORIDE 5 MILLIGRAM(S): 5 TABLET ORAL at 05:30

## 2022-07-27 RX ADMIN — Medication 650 MILLIGRAM(S): at 00:00

## 2022-07-27 RX ADMIN — OXYCODONE HYDROCHLORIDE 5 MILLIGRAM(S): 5 TABLET ORAL at 04:52

## 2022-07-27 RX ADMIN — OXYCODONE HYDROCHLORIDE 10 MILLIGRAM(S): 5 TABLET ORAL at 00:00

## 2022-07-27 RX ADMIN — Medication 400 MILLIGRAM(S): at 13:29

## 2022-07-27 RX ADMIN — Medication 100 MILLIEQUIVALENT(S): at 11:21

## 2022-07-27 RX ADMIN — OXYCODONE HYDROCHLORIDE 10 MILLIGRAM(S): 5 TABLET ORAL at 18:25

## 2022-07-27 RX ADMIN — Medication 650 MILLIGRAM(S): at 07:45

## 2022-07-27 RX ADMIN — Medication 20 MILLIGRAM(S): at 07:03

## 2022-07-27 RX ADMIN — Medication 650 MILLIGRAM(S): at 07:03

## 2022-07-27 RX ADMIN — PANTOPRAZOLE SODIUM 40 MILLIGRAM(S): 20 TABLET, DELAYED RELEASE ORAL at 07:02

## 2022-07-27 RX ADMIN — GABAPENTIN 300 MILLIGRAM(S): 400 CAPSULE ORAL at 10:42

## 2022-07-27 RX ADMIN — FENTANYL CITRATE 25 MICROGRAM(S): 50 INJECTION INTRAVENOUS at 22:30

## 2022-07-27 RX ADMIN — Medication 81 MILLIGRAM(S): at 11:21

## 2022-07-27 RX ADMIN — OXYCODONE HYDROCHLORIDE 10 MILLIGRAM(S): 5 TABLET ORAL at 18:22

## 2022-07-27 RX ADMIN — ATORVASTATIN CALCIUM 40 MILLIGRAM(S): 80 TABLET, FILM COATED ORAL at 21:40

## 2022-07-27 RX ADMIN — Medication 200 GRAM(S): at 12:21

## 2022-07-27 RX ADMIN — Medication 1000 MILLIGRAM(S): at 13:39

## 2022-07-27 NOTE — PROGRESS NOTE ADULT - SUBJECTIVE AND OBJECTIVE BOX
CTICU  CRITICAL  CARE  attending     Hand off received 					   Pertinent clinical, laboratory, radiographic, hemodynamic, echocardiographic, respiratory data, microbiologic data and chart were reviewed and analyzed frequently throughout the course of the day and night  Patient seen and examined with CTS/ SH attending at bedside      Pt is a 57y , Male, post op day # 2 s/p CABG x 4; repair of Right femoral pseudo aneurysm    post op:    pressor requirement  multiple transfusions    currently:    weaned off drips  started on beta blockers  H/H stable    TTE: today ( f/u from yesterday) small pericardial effusion; no tamponade physiology      , FAMILY HISTORY:  PAST MEDICAL & SURGICAL HISTORY:    Patient is a 57y old  Male who presents with a chief complaint of CAD (27 Jul 2022 10:45)      14 system review limited 2/2 post op morbidity    Vital signs, hemodynamic and respiratory parameters were reviewed from the bedside nursing flowsheet.  ICU Vital Signs Last 24 Hrs  T(C): 36.6 (27 Jul 2022 22:37), Max: 37.6 (27 Jul 2022 05:01)  T(F): 97.8 (27 Jul 2022 22:37), Max: 99.6 (27 Jul 2022 05:01)  HR: 108 (27 Jul 2022 22:00) (92 - 113)  BP: 109/67 (27 Jul 2022 22:00) (105/78 - 131/67)  BP(mean): 82 (27 Jul 2022 22:00) (78 - 88)  ABP: 91/62 (27 Jul 2022 16:00) (91/62 - 152/90)  ABP(mean): 69 (27 Jul 2022 16:00) (65 - 80)  RR: 18 (27 Jul 2022 22:00) (15 - 20)  SpO2: 98% (27 Jul 2022 22:00) (93% - 100%)    O2 Parameters below as of 27 Jul 2022 22:00  Patient On (Oxygen Delivery Method): nasal cannula w/ humidification  O2 Flow (L/min): 4        Adult Advanced Hemodynamics Last 24 Hrs  CVP(mm Hg): 14 (27 Jul 2022 22:00) (3 - 53)  CVP(cm H2O): --  CO: --  CI: --  PA: --  PA(mean): --  PCWP: --  SVR: --  SVRI: --  PVR: --  PVRI: --, ABG - ( 27 Jul 2022 13:47 )  pH, Arterial: 7.49  pH, Blood: x     /  pCO2: 28    /  pO2: 105   / HCO3: 21    / Base Excess: -0.9  /  SaO2: 99.1                Intake and output was reviewed and the fluid balance was calculated  Daily     Daily   I&O's Summary    26 Jul 2022 07:01  -  27 Jul 2022 07:00  --------------------------------------------------------  IN: 4150.8 mL / OUT: 2940 mL / NET: 1210.8 mL    27 Jul 2022 07:01  -  27 Jul 2022 23:20  --------------------------------------------------------  IN: 1910 mL / OUT: 2855 mL / NET: -945 mL        All lines and drain sites were assessed  Glycemic trend [facility]      POCT Blood Glucose.: 148 mg/dL (27 Jul 2022 21:45)    No acute change in mental status  Auscultation of the chest reveals equal bs  Abdomen is soft  Extremities are warm and well perfused  Wounds appear clean and unremarkable  Antibiotics are periop    labs  CBC Full  -  ( 27 Jul 2022 21:01 )  WBC Count : 14.87 K/uL  RBC Count : 3.11 M/uL  Hemoglobin : 9.1 g/dL  Hematocrit : 26.7 %  Platelet Count - Automated : 210 K/uL  Mean Cell Volume : 85.9 fl  Mean Cell Hemoglobin : 29.3 pg  Mean Cell Hemoglobin Concentration : 34.1 gm/dL  Auto Neutrophil # : x  Auto Lymphocyte # : x  Auto Monocyte # : x  Auto Eosinophil # : x  Auto Basophil # : x  Auto Neutrophil % : x  Auto Lymphocyte % : x  Auto Monocyte % : x  Auto Eosinophil % : x  Auto Basophil % : x    07-27    134<L>  |  98  |  20  ----------------------------<  139<H>  4.6   |  23  |  1.18    Ca    8.4      27 Jul 2022 21:01  Phos  2.4     07-27  Mg     2.2     07-27    TPro  5.8<L>  /  Alb  3.2<L>  /  TBili  0.9  /  DBili  x   /  AST  41<H>  /  ALT  17  /  AlkPhos  51  07-27    PT/INR - ( 27 Jul 2022 21:01 )   PT: 20.6 sec;   INR: 1.72          PTT - ( 27 Jul 2022 21:01 )  PTT:27.3 sec  The current medications were reviewed   MEDICATIONS  (STANDING):  albumin human 25% IVPB 50 milliLiter(s) IV Intermittent every 10 minutes  aspirin  chewable 81 milliGRAM(s) Oral daily  atorvastatin 40 milliGRAM(s) Oral at bedtime  dextrose 5%. 1000 milliLiter(s) (50 mL/Hr) IV Continuous <Continuous>  dextrose 5%. 1000 milliLiter(s) (100 mL/Hr) IV Continuous <Continuous>  dextrose 50% Injectable 25 Gram(s) IV Push once  dextrose 50% Injectable 12.5 Gram(s) IV Push once  dextrose 50% Injectable 25 Gram(s) IV Push once  gabapentin 300 milliGRAM(s) Oral every 8 hours  glucagon  Injectable 1 milliGRAM(s) IntraMuscular once  heparin   Injectable 5000 Unit(s) SubCutaneous every 8 hours  insulin lispro (ADMELOG) corrective regimen sliding scale   SubCutaneous Before meals and at bedtime  metoprolol tartrate 12.5 milliGRAM(s) Oral every 6 hours  norepinephrine Infusion 0.04 MICROgram(s)/kG/Min (8.24 mL/Hr) IV Continuous <Continuous>  pantoprazole    Tablet 40 milliGRAM(s) Oral before breakfast  phenylephrine    Infusion 0.1 MICROgram(s)/kG/Min (4.12 mL/Hr) IV Continuous <Continuous>  polyethylene glycol 3350 17 Gram(s) Oral daily  sodium chloride 0.9%. 1000 milliLiter(s) (10 mL/Hr) IV Continuous <Continuous>    MEDICATIONS  (PRN):  dextrose Oral Gel 15 Gram(s) Oral once PRN Blood Glucose LESS THAN 70 milliGRAM(s)/deciliter  melatonin 5 milliGRAM(s) Oral at bedtime PRN Insomnia  oxyCODONE    IR 10 milliGRAM(s) Oral every 6 hours PRN Severe Pain (7 - 10)  oxyCODONE    IR 5 milliGRAM(s) Oral every 4 hours PRN Moderate Pain (4 - 6)       PROBLEM LIST/ ASSESSMENT:  HEALTH ISSUES - PROBLEM Dx:    Active:    acute post H'gic anemia  s/p CABG    Stable/Established:    s/p recent COVID  obesity  CAD    ,   Patient is a 57y old  Male who presents with a chief complaint of CAD (27 Jul 2022 10:45)     s/p CABG x 4V        My plan includes :    Maintain MAP >65-70  uptitrate beta blockers as tolerated  trend H/H    close hemodynamic, ventilatory and drain monitoring and management per post op routine    Monitor for arrhythmias and monitor parameters for organ perfusion  monitor neurologic status  Head of the bed should remain elevated to 45 deg .   chest PT and IS will be encouraged  monitor adequacy of oxygenation and ventilation and attempt to wean oxygen  Nutritional goals will be met using po eventually , ensure adequate caloric intake and montior the same  Stress ulcer and VTE prophylaxis will be achieved    Glycemic control is satisfactory  Electrolytes have been repleted as necessary and wound care has been carried out. Pain control has been achieved.   agressive physical therapy and early mobility and ambulation goals will be met   The family was updated about the course and plan  CRITICAL CARE TIME SPENT in evaluation and management, reassessments, review and interpretation of labs and x-rays, ventilator and hemodynamic management, formulating a plan and coordinating care: ___25___ MIN.  Time does not include procedural time.  CTICU ATTENDING     					    Taqueria Quintero MD

## 2022-07-27 NOTE — PROGRESS NOTE ADULT - SUBJECTIVE AND OBJECTIVE BOX
CTICU  CRITICAL  CARE  attending     Hand off received 					   Pertinent clinical, laboratory, radiographic, hemodynamic, echocardiographic, respiratory data, microbiologic data and chart were reviewed and analyzed frequently throughout the course of the day and night  Patient seen and examined with CTS/ SH attending at bedside  Pt is a 57y , Male, HEALTH ISSUES - PROBLEM Dx:      , FAMILY HISTORY:  PAST MEDICAL & SURGICAL HISTORY:    Patient is a 57y old  Male who presents with a chief complaint of CAD (26 Jul 2022 20:30)      14 system review was unremarkable    Vital signs, hemodynamic and respiratory parameters were reviewed from the bedside nursing flowsheet.  ICU Vital Signs Last 24 Hrs  T(C): 36.8 (27 Jul 2022 08:48), Max: 37.6 (27 Jul 2022 05:01)  T(F): 98.3 (27 Jul 2022 08:48), Max: 99.6 (27 Jul 2022 05:01)  HR: 92 (27 Jul 2022 09:10) (79 - 110)  BP: 112/59 (27 Jul 2022 09:00) (112/59 - 112/59)  BP(mean): 78 (27 Jul 2022 09:00) (78 - 78)  ABP: 108/55 (27 Jul 2022 09:00) (101/61 - 138/62)  ABP(mean): 69 (27 Jul 2022 09:00) (61 - 80)  RR: 18 (27 Jul 2022 09:10) (18 - 20)  SpO2: 97% (27 Jul 2022 09:10) (93% - 100%)    O2 Parameters below as of 27 Jul 2022 10:00  Patient On (Oxygen Delivery Method): nasal cannula, high flow  O2 Flow (L/min): 40  O2 Concentration (%): 40      Adult Advanced Hemodynamics Last 24 Hrs  CVP(mm Hg): 11 (27 Jul 2022 09:00) (5 - 337)  CVP(cm H2O): --  CO: --  CI: --  PA: --  PA(mean): --  PCWP: --  SVR: --  SVRI: --  PVR: --  PVRI: --, ABG - ( 27 Jul 2022 10:10 )  pH, Arterial: 7.49  pH, Blood: x     /  pCO2: 33    /  pO2: 86    / HCO3: 25    / Base Excess: 2.2   /  SaO2: 98.2                Intake and output was reviewed and the fluid balance was calculated  Daily     Daily   I&O's Summary    26 Jul 2022 07:01  -  27 Jul 2022 07:00  --------------------------------------------------------  IN: 4150.8 mL / OUT: 2940 mL / NET: 1210.8 mL    27 Jul 2022 07:01  -  27 Jul 2022 10:47  --------------------------------------------------------  IN: 320 mL / OUT: 1130 mL / NET: -810 mL        All lines and drain sites were assessed  Gly[facility] trend [facility]      POCT Blood Glucose.: 95 mg/dL (27 Jul 2022 06:39)    No acute change in mental status  Auscultation of the chest reveals equal bs  Abdomen is soft  Extremities are warm and well perfused  Wounds appear clean and unremarkable  Antibiotics are periop    labs  CBC Full  -  ( 27 Jul 2022 10:18 )  WBC Count : 13.24 K/uL  RBC Count : 3.22 M/uL  Hemoglobin : 9.3 g/dL  Hematocrit : 27.2 %  Platelet Count - Automated : 187 K/uL  Mean Cell Volume : 84.5 fl  Mean Cell Hemoglobin : 28.9 pg  Mean Cell Hemoglobin Concentration : 34.2 gm/dL  Auto Neutrophil # : x  Auto Lymphocyte # : x  Auto Monocyte # : x  Auto Eosinophil # : x  Auto Basophil # : x  Auto Neutrophil % : x  Auto Lymphocyte % : x  Auto Monocyte % : x  Auto Eosinophil % : x  Auto Basophil % : x    07-27    138  |  103  |  18  ----------------------------<  162<H>  4.1   |  22  |  1.29    Ca    8.4      27 Jul 2022 03:24  Phos  3.3     07-27  Mg     2.0     07-27    TPro  5.3<L>  /  Alb  3.2<L>  /  TBili  0.9  /  DBili  x   /  AST  47<H>  /  ALT  19  /  AlkPhos  44  07-27    PT/INR - ( 27 Jul 2022 10:18 )   PT: 19.4 sec;   INR: 1.62          PTT - ( 27 Jul 2022 10:18 )  PTT:28.9 sec  The current medications were reviewed   MEDICATIONS  (STANDING):  acetaminophen   IVPB .. 1000 milliGRAM(s) IV Intermittent once  albumin human 25% IVPB 50 milliLiter(s) IV Intermittent every 10 minutes  aspirin  chewable 81 milliGRAM(s) Oral daily  atorvastatin 40 milliGRAM(s) Oral at bedtime  dextrose 50% Injectable 50 milliLiter(s) IV Push every 15 minutes  dextrose 50% Injectable 25 milliLiter(s) IV Push every 15 minutes  gabapentin 300 milliGRAM(s) Oral every 8 hours  heparin   Injectable 5000 Unit(s) SubCutaneous every 8 hours  insulin regular Infusion 2 Unit(s)/Hr (2 mL/Hr) IV Continuous <Continuous>  norepinephrine Infusion 0.04 MICROgram(s)/kG/Min (8.24 mL/Hr) IV Continuous <Continuous>  pantoprazole    Tablet 40 milliGRAM(s) Oral before breakfast  phenylephrine    Infusion 0.1 MICROgram(s)/kG/Min (4.12 mL/Hr) IV Continuous <Continuous>  polyethylene glycol 3350 17 Gram(s) Oral daily  potassium chloride  20 mEq/100 mL IVPB 20 milliEquivalent(s) IV Intermittent every 1 hour  sodium chloride 0.9%. 1000 milliLiter(s) (10 mL/Hr) IV Continuous <Continuous>    MEDICATIONS  (PRN):  fentaNYL    Injectable 25 MICROGram(s) IV Push every 3 hours PRN Severe Pain (7 - 10)  melatonin 5 milliGRAM(s) Oral at bedtime PRN Insomnia  oxyCODONE    IR 5 milliGRAM(s) Oral every 4 hours PRN Moderate Pain (4 - 6)  oxyCODONE    IR 10 milliGRAM(s) Oral every 6 hours PRN Severe Pain (7 - 10)       PROBLEM LIST/ ASSESSMENT:  HEALTH ISSUES - PROBLEM Dx:      ,   Patient is a 57y old  Male who presents with a chief complaint of CAD (26 Jul 2022 20:30)     s/p cardiac surgery      Vasogenic shock due to hypotension in the cticu , will keep on pressors    Hypovolemic shock - > 20% intravascular depletion will replete volume          Acidosis evidenced by anion gap and negative base excess          My plan includes :    pc repletion  diuresis   close hemodynamic, ventilatory and drain monitoring and management per post op routine    Monitor for arrhythmias and monitor parameters for organ perfusion  beta blockade not administered due to hemodynamic instability and bradycardia  monitor neurologic status  Head of the bed should remain elevated to 45 deg .   chest PT and IS will be encouraged  monitor adequacy of oxygenation and ventilation and attempt to wean oxygen  antibiotic regimen will be tailored to the clinical, laboratory and microbiologic data  Nutritional goals will be met using po eventually , ensure adequate caloric intake and montior the same  Stress ulcer and VTE prophylaxis will be achieved    Glycemic control is satisfactory  Electrolytes have been repleted as necessary and wound care has been carried out. Pain control has been achieved.   agressive physical therapy and early mobility and ambulation goals will be met   The family was updated about the course and plan  CRITICAL CARE TIME personally provided by me  in evaluation and management, reassessments, review and interpretation of labs and x-rays, ventilator and hemodynamic management, formulating a plan and coordinating care: ___90____ MIN.  Time does not include procedural time.  CTICU ATTENDING     					    Jas Britt MD

## 2022-07-28 LAB
ALBUMIN FLD-MCNC: 2.8 G/DL — SIGNIFICANT CHANGE UP
ALBUMIN SERPL ELPH-MCNC: 3 G/DL — LOW (ref 3.3–5)
ALBUMIN SERPL ELPH-MCNC: 3.4 G/DL — SIGNIFICANT CHANGE UP (ref 3.3–5)
ALP SERPL-CCNC: 50 U/L — SIGNIFICANT CHANGE UP (ref 40–120)
ALP SERPL-CCNC: 65 U/L — SIGNIFICANT CHANGE UP (ref 40–120)
ALT FLD-CCNC: 16 U/L — SIGNIFICANT CHANGE UP (ref 10–45)
ALT FLD-CCNC: 20 U/L — SIGNIFICANT CHANGE UP (ref 10–45)
ANION GAP SERPL CALC-SCNC: 9 MMOL/L — SIGNIFICANT CHANGE UP (ref 5–17)
ANION GAP SERPL CALC-SCNC: 9 MMOL/L — SIGNIFICANT CHANGE UP (ref 5–17)
APTT BLD: 28.7 SEC — SIGNIFICANT CHANGE UP (ref 27.5–35.5)
APTT BLD: 29.5 SEC — SIGNIFICANT CHANGE UP (ref 27.5–35.5)
AST SERPL-CCNC: 34 U/L — SIGNIFICANT CHANGE UP (ref 10–40)
AST SERPL-CCNC: 42 U/L — HIGH (ref 10–40)
B PERT IGG+IGM PNL SER: SIGNIFICANT CHANGE UP
BILIRUB SERPL-MCNC: 0.8 MG/DL — SIGNIFICANT CHANGE UP (ref 0.2–1.2)
BILIRUB SERPL-MCNC: 0.8 MG/DL — SIGNIFICANT CHANGE UP (ref 0.2–1.2)
BLD GP AB SCN SERPL QL: NEGATIVE — SIGNIFICANT CHANGE UP
BUN SERPL-MCNC: 18 MG/DL — SIGNIFICANT CHANGE UP (ref 7–23)
BUN SERPL-MCNC: 23 MG/DL — SIGNIFICANT CHANGE UP (ref 7–23)
CALCIUM SERPL-MCNC: 8.2 MG/DL — LOW (ref 8.4–10.5)
CALCIUM SERPL-MCNC: 8.5 MG/DL — SIGNIFICANT CHANGE UP (ref 8.4–10.5)
CHLORIDE SERPL-SCNC: 98 MMOL/L — SIGNIFICANT CHANGE UP (ref 96–108)
CHLORIDE SERPL-SCNC: 99 MMOL/L — SIGNIFICANT CHANGE UP (ref 96–108)
CO2 SERPL-SCNC: 26 MMOL/L — SIGNIFICANT CHANGE UP (ref 22–31)
CO2 SERPL-SCNC: 27 MMOL/L — SIGNIFICANT CHANGE UP (ref 22–31)
COLOR FLD: SIGNIFICANT CHANGE UP
CREAT SERPL-MCNC: 1.08 MG/DL — SIGNIFICANT CHANGE UP (ref 0.5–1.3)
CREAT SERPL-MCNC: 1.12 MG/DL — SIGNIFICANT CHANGE UP (ref 0.5–1.3)
EGFR: 78 ML/MIN/1.73M2 — SIGNIFICANT CHANGE UP
EGFR: 81 ML/MIN/1.73M2 — SIGNIFICANT CHANGE UP
FLUID INTAKE SUBSTANCE CLASS: SIGNIFICANT CHANGE UP
GLUCOSE BLDC GLUCOMTR-MCNC: 114 MG/DL — HIGH (ref 70–99)
GLUCOSE BLDC GLUCOMTR-MCNC: 129 MG/DL — HIGH (ref 70–99)
GLUCOSE BLDC GLUCOMTR-MCNC: 130 MG/DL — HIGH (ref 70–99)
GLUCOSE BLDC GLUCOMTR-MCNC: 155 MG/DL — HIGH (ref 70–99)
GLUCOSE FLD-MCNC: 33 MG/DL — SIGNIFICANT CHANGE UP
GLUCOSE SERPL-MCNC: 136 MG/DL — HIGH (ref 70–99)
GLUCOSE SERPL-MCNC: 138 MG/DL — HIGH (ref 70–99)
GRAM STN FLD: SIGNIFICANT CHANGE UP
HCT VFR BLD CALC: 25 % — LOW (ref 39–50)
HCT VFR BLD CALC: 26.3 % — LOW (ref 39–50)
HGB BLD-MCNC: 8.5 G/DL — LOW (ref 13–17)
HGB BLD-MCNC: 8.9 G/DL — LOW (ref 13–17)
INR BLD: 1.66 — HIGH (ref 0.88–1.16)
INR BLD: 1.7 — HIGH (ref 0.88–1.16)
LDH SERPL L TO P-CCNC: 1256 U/L — SIGNIFICANT CHANGE UP
LYMPHOCYTES # FLD: 18 % — SIGNIFICANT CHANGE UP
MAGNESIUM SERPL-MCNC: 2.2 MG/DL — SIGNIFICANT CHANGE UP (ref 1.6–2.6)
MAGNESIUM SERPL-MCNC: 2.3 MG/DL — SIGNIFICANT CHANGE UP (ref 1.6–2.6)
MCHC RBC-ENTMCNC: 29.3 PG — SIGNIFICANT CHANGE UP (ref 27–34)
MCHC RBC-ENTMCNC: 29.3 PG — SIGNIFICANT CHANGE UP (ref 27–34)
MCHC RBC-ENTMCNC: 33.8 GM/DL — SIGNIFICANT CHANGE UP (ref 32–36)
MCHC RBC-ENTMCNC: 34 GM/DL — SIGNIFICANT CHANGE UP (ref 32–36)
MCV RBC AUTO: 86.2 FL — SIGNIFICANT CHANGE UP (ref 80–100)
MCV RBC AUTO: 86.5 FL — SIGNIFICANT CHANGE UP (ref 80–100)
MONOS+MACROS # FLD: 2 % — SIGNIFICANT CHANGE UP
NEUTROPHILS-BODY FLUID: 80 % — SIGNIFICANT CHANGE UP
NRBC # BLD: 0 /100 WBCS — SIGNIFICANT CHANGE UP (ref 0–0)
NRBC # BLD: 0 /100 WBCS — SIGNIFICANT CHANGE UP (ref 0–0)
PHOSPHATE SERPL-MCNC: 2.6 MG/DL — SIGNIFICANT CHANGE UP (ref 2.5–4.5)
PHOSPHATE SERPL-MCNC: 3.1 MG/DL — SIGNIFICANT CHANGE UP (ref 2.5–4.5)
PLATELET # BLD AUTO: 199 K/UL — SIGNIFICANT CHANGE UP (ref 150–400)
PLATELET # BLD AUTO: 248 K/UL — SIGNIFICANT CHANGE UP (ref 150–400)
POTASSIUM SERPL-MCNC: 4.3 MMOL/L — SIGNIFICANT CHANGE UP (ref 3.5–5.3)
POTASSIUM SERPL-MCNC: 4.4 MMOL/L — SIGNIFICANT CHANGE UP (ref 3.5–5.3)
POTASSIUM SERPL-SCNC: 4.3 MMOL/L — SIGNIFICANT CHANGE UP (ref 3.5–5.3)
POTASSIUM SERPL-SCNC: 4.4 MMOL/L — SIGNIFICANT CHANGE UP (ref 3.5–5.3)
PROT FLD-MCNC: 4.1 G/DL — SIGNIFICANT CHANGE UP
PROT SERPL-MCNC: 5.6 G/DL — LOW (ref 6–8.3)
PROT SERPL-MCNC: 6.1 G/DL — SIGNIFICANT CHANGE UP (ref 6–8.3)
PROTHROM AB SERPL-ACNC: 19.8 SEC — HIGH (ref 10.5–13.4)
PROTHROM AB SERPL-ACNC: 20.3 SEC — HIGH (ref 10.5–13.4)
RBC # BLD: 2.9 M/UL — LOW (ref 4.2–5.8)
RBC # BLD: 3.04 M/UL — LOW (ref 4.2–5.8)
RBC # FLD: 15.5 % — HIGH (ref 10.3–14.5)
RBC # FLD: 15.7 % — HIGH (ref 10.3–14.5)
RCV VOL RI: HIGH /UL (ref 0–0)
RH IG SCN BLD-IMP: NEGATIVE — SIGNIFICANT CHANGE UP
SARS-COV-2 RNA SPEC QL NAA+PROBE: DETECTED
SODIUM SERPL-SCNC: 134 MMOL/L — LOW (ref 135–145)
SODIUM SERPL-SCNC: 134 MMOL/L — LOW (ref 135–145)
SPECIMEN SOURCE FLD: SIGNIFICANT CHANGE UP
SPECIMEN SOURCE: SIGNIFICANT CHANGE UP
TOTAL NUCLEATED CELL COUNT, BODY FLUID: SIGNIFICANT CHANGE UP /UL
TUBE TYPE: SIGNIFICANT CHANGE UP
WBC # BLD: 14.28 K/UL — HIGH (ref 3.8–10.5)
WBC # BLD: 16.58 K/UL — HIGH (ref 3.8–10.5)
WBC # FLD AUTO: 14.28 K/UL — HIGH (ref 3.8–10.5)
WBC # FLD AUTO: 16.58 K/UL — HIGH (ref 3.8–10.5)

## 2022-07-28 PROCEDURE — 99447 NTRPROF PH1/NTRNET/EHR 11-20: CPT

## 2022-07-28 PROCEDURE — 71250 CT THORAX DX C-: CPT | Mod: 26

## 2022-07-28 PROCEDURE — 99292 CRITICAL CARE ADDL 30 MIN: CPT

## 2022-07-28 PROCEDURE — 74176 CT ABD & PELVIS W/O CONTRAST: CPT | Mod: 26

## 2022-07-28 PROCEDURE — 99291 CRITICAL CARE FIRST HOUR: CPT

## 2022-07-28 PROCEDURE — 71045 X-RAY EXAM CHEST 1 VIEW: CPT | Mod: 26

## 2022-07-28 PROCEDURE — 99233 SBSQ HOSP IP/OBS HIGH 50: CPT

## 2022-07-28 PROCEDURE — 33017 PRCRD DRG 6YR+ W/O CGEN CAR: CPT

## 2022-07-28 RX ORDER — CHLORHEXIDINE GLUCONATE 213 G/1000ML
1 SOLUTION TOPICAL DAILY
Refills: 0 | Status: DISCONTINUED | OUTPATIENT
Start: 2022-07-28 | End: 2022-08-01

## 2022-07-28 RX ORDER — ACETAMINOPHEN 500 MG
650 TABLET ORAL EVERY 6 HOURS
Refills: 0 | Status: DISCONTINUED | OUTPATIENT
Start: 2022-07-28 | End: 2022-08-01

## 2022-07-28 RX ADMIN — HEPARIN SODIUM 5000 UNIT(S): 5000 INJECTION INTRAVENOUS; SUBCUTANEOUS at 22:03

## 2022-07-28 RX ADMIN — GABAPENTIN 300 MILLIGRAM(S): 400 CAPSULE ORAL at 01:10

## 2022-07-28 RX ADMIN — Medication 1 MILLIGRAM(S): at 13:07

## 2022-07-28 RX ADMIN — Medication 81 MILLIGRAM(S): at 11:41

## 2022-07-28 RX ADMIN — PANTOPRAZOLE SODIUM 40 MILLIGRAM(S): 20 TABLET, DELAYED RELEASE ORAL at 05:56

## 2022-07-28 RX ADMIN — Medication 12.5 MILLIGRAM(S): at 05:56

## 2022-07-28 RX ADMIN — HEPARIN SODIUM 5000 UNIT(S): 5000 INJECTION INTRAVENOUS; SUBCUTANEOUS at 05:55

## 2022-07-28 RX ADMIN — OXYCODONE HYDROCHLORIDE 10 MILLIGRAM(S): 5 TABLET ORAL at 01:58

## 2022-07-28 RX ADMIN — GABAPENTIN 300 MILLIGRAM(S): 400 CAPSULE ORAL at 20:01

## 2022-07-28 RX ADMIN — Medication 12.5 MILLIGRAM(S): at 17:35

## 2022-07-28 RX ADMIN — OXYCODONE HYDROCHLORIDE 10 MILLIGRAM(S): 5 TABLET ORAL at 15:53

## 2022-07-28 RX ADMIN — OXYCODONE HYDROCHLORIDE 10 MILLIGRAM(S): 5 TABLET ORAL at 16:45

## 2022-07-28 RX ADMIN — OXYCODONE HYDROCHLORIDE 5 MILLIGRAM(S): 5 TABLET ORAL at 07:18

## 2022-07-28 RX ADMIN — OXYCODONE HYDROCHLORIDE 10 MILLIGRAM(S): 5 TABLET ORAL at 02:30

## 2022-07-28 RX ADMIN — Medication 12.5 MILLIGRAM(S): at 11:41

## 2022-07-28 RX ADMIN — Medication 2: at 17:37

## 2022-07-28 RX ADMIN — OXYCODONE HYDROCHLORIDE 5 MILLIGRAM(S): 5 TABLET ORAL at 08:00

## 2022-07-28 RX ADMIN — POLYETHYLENE GLYCOL 3350 17 GRAM(S): 17 POWDER, FOR SOLUTION ORAL at 17:05

## 2022-07-28 RX ADMIN — GABAPENTIN 300 MILLIGRAM(S): 400 CAPSULE ORAL at 11:41

## 2022-07-28 RX ADMIN — Medication 5 MILLIGRAM(S): at 22:02

## 2022-07-28 RX ADMIN — ATORVASTATIN CALCIUM 40 MILLIGRAM(S): 80 TABLET, FILM COATED ORAL at 22:02

## 2022-07-28 RX ADMIN — CHLORHEXIDINE GLUCONATE 1 APPLICATION(S): 213 SOLUTION TOPICAL at 11:36

## 2022-07-28 NOTE — CONSULT NOTE ADULT - ASSESSMENT
Assessment: 57M with no significant PMHx, recent COVID infection 3 weeks ago, who initially presented to [facility] ED on 7/19 with exertional chest pain X1 week, relieved with rest. In the ED patient noted to have elevated troponin and was ruled in for NSTEMI. He was given aspirin and Brilinta and taken to the cath lab. Cath revealed 3VCAD: LAD 90%, D1 80%, LCx 90%, OM1 100% thrombotic occlusion with collaterals from RCA, RPDA 80%. IABP was placed and patient was brought to the ICU post cath. IABP was removed the next day and decision made to transfer to [facility] under the care of Dr. Bran for surgical evaluation. S/p CABG x4 7/25, and repair of right common femoral artery pseudoaneurysm. 7/26 TTE Pericardial effusion found. CT 7/28 showing New moderate hemopericardium. IR consulted for pericardial drain placement. Case reviewed with Dr. Forrest, plan for procedure with local anesthesia as pt ate breakfast.    Communicated with: primary team

## 2022-07-28 NOTE — CONSULT NOTE ADULT - SUBJECTIVE AND OBJECTIVE BOX
Vascular Attending:  Dr. Benavides      HPI:  57 y.o M with no significant PMHx, recent COVID infection 3 weeks ago, who initially presented to [facility] ED on 7/19 with exertional chest pain X1 week, relieved with rest. In the ED patient noted to have elevated troponin and was ruled in for NSTEMI. He was given aspirin and brilinta and taken to the cath lab. Cath revealed 3VCAD: LAD 90%, D1 80%, LCx 90%, OM1 100% thrombotic occlusion with collaterals from RCA, RPDA 80%. IABP was placed and patient was brought to the ICU post cath. IABP was removed the next day and decision made to transfer to [facility] under the care of Dr. Bran for surgical evaluation.  (21 Jul 2022 11:09)    Vascular Addendum:  IABP was placed via R groin. Patient began having swelling and pain over the groin last night and was transferred this am stating "no-one looked at my groin". The pain and swelling have not gotten worse since last night. He denies any c/p, SOB, pain or numbness in either lower extremity    PAST MEDICAL & SURGICAL HISTORY:    MEDICATIONS  (STANDING):  aspirin  chewable 81 milliGRAM(s) Oral daily  atorvastatin 40 milliGRAM(s) Oral at bedtime  metoprolol tartrate 12.5 milliGRAM(s) Oral every 12 hours  pantoprazole    Tablet 40 milliGRAM(s) Oral before breakfast  polyethylene glycol 3350 17 Gram(s) Oral daily  sodium chloride 0.9% lock flush 3 milliLiter(s) IV Push every 8 hours    MEDICATIONS  (PRN):  acetaminophen     Tablet .. 650 milliGRAM(s) Oral every 6 hours PRN Mild Pain (1 - 3)      Allergies    No Known Allergies    Intolerances        SOCIAL HISTORY:    FAMILY HISTORY:      Vital Signs Last 24 Hrs  T(C): 36.6 (21 Jul 2022 20:33), Max: 36.6 (21 Jul 2022 20:33)  T(F): 97.8 (21 Jul 2022 20:33), Max: 97.8 (21 Jul 2022 20:33)  HR: 72 (21 Jul 2022 21:00) (72 - 76)  BP: 131/66 (21 Jul 2022 21:00) (124/70 - 131/66)  BP(mean): 91 (21 Jul 2022 21:00) (91 - 92)  RR: 16 (21 Jul 2022 21:00) (16 - 18)  SpO2: 97% (21 Jul 2022 21:00) (96% - 97%)    Parameters below as of 21 Jul 2022 21:00  Patient On (Oxygen Delivery Method): room air        PHYSICAL EXAM:  Gen: NAD, resting in bed  Resp: normal respiratory effort on RA  CV: RRR  Abd: Soft, NT, ND, R groin with swelling and ecchymosis. tender to palpation. Pulsatile palpable mass felt without overlying skin changes.  Ext: warm, well perfused, 5/5 strength in all extremities, motor sensory intact. palpable dp/pt pulses b/l      LABS:                        14.6   14.12 )-----------( 284      ( 21 Jul 2022 19:33 )             41.7     07-21    137  |  98  |  15  ----------------------------<  118<H>  4.3   |  29  |  1.07    Ca    9.3      21 Jul 2022 11:35  Mg     2.2     07-21    TPro  7.8  /  Alb  4.8  /  TBili  0.9  /  DBili  x   /  AST  42<H>  /  ALT  26  /  AlkPhos  70  07-21    PT/INR - ( 21 Jul 2022 19:33 )   PT: 15.9 sec;   INR: 1.33          PTT - ( 21 Jul 2022 19:33 )  PTT:35.8 sec      RADIOLOGY & ADDITIONAL STUDIES
57M with no significant PMHx, recent COVID infection 3 weeks ago, who initially presented to [facility] ED on 7/19 with exertional chest pain X1 week, relieved with rest. In the ED patient noted to have elevated troponin and was ruled in for NSTEMI. He was given aspirin and Brilinta and taken to the cath lab. Cath revealed 3VCAD: LAD 90%, D1 80%, LCx 90%, OM1 100% thrombotic occlusion with collaterals from RCA, RPDA 80%. IABP was placed and patient was brought to the ICU post cath. IABP was removed the next day and decision made to transfer to [facility] under the care of Dr. Bran for surgical evaluation. S/p CABG x4 7/25, and repair of right common femoral artery pseudoaneurysm. 7/26 TTE Pericardial effusion found. CT 7/28 showing New moderate hemopericardium. IR consulted for pericardial drain placement.     Clinical History: CAD    Handoff    MEWS Score    CAD (coronary artery disease)    Pseudoaneurysm of right femoral artery    CAD (coronary artery disease)    Pseudoaneurysm of right femoral artery    CABG, with NICK    Repair of femoral pseudoaneurysm    SysAdmin_VstLnk        Meds:albumin human 25% IVPB 50 milliLiter(s) IV Intermittent every 10 minutes  aspirin  chewable 81 milliGRAM(s) Oral daily  atorvastatin 40 milliGRAM(s) Oral at bedtime  chlorhexidine 2% Cloths 1 Application(s) Topical daily  dextrose 5%. 1000 milliLiter(s) IV Continuous <Continuous>  dextrose 5%. 1000 milliLiter(s) IV Continuous <Continuous>  dextrose 50% Injectable 25 Gram(s) IV Push once  dextrose 50% Injectable 12.5 Gram(s) IV Push once  dextrose 50% Injectable 25 Gram(s) IV Push once  dextrose Oral Gel 15 Gram(s) Oral once PRN  gabapentin 300 milliGRAM(s) Oral every 8 hours  glucagon  Injectable 1 milliGRAM(s) IntraMuscular once  heparin   Injectable 5000 Unit(s) SubCutaneous every 8 hours  insulin lispro (ADMELOG) corrective regimen sliding scale   SubCutaneous Before meals and at bedtime  melatonin 5 milliGRAM(s) Oral at bedtime PRN  metoprolol tartrate 12.5 milliGRAM(s) Oral every 6 hours  norepinephrine Infusion 0.04 MICROgram(s)/kG/Min IV Continuous <Continuous>  oxyCODONE    IR 5 milliGRAM(s) Oral every 4 hours PRN  oxyCODONE    IR 10 milliGRAM(s) Oral every 6 hours PRN  pantoprazole    Tablet 40 milliGRAM(s) Oral before breakfast  phenylephrine    Infusion 0.1 MICROgram(s)/kG/Min IV Continuous <Continuous>  polyethylene glycol 3350 17 Gram(s) Oral daily  sodium chloride 0.9%. 1000 milliLiter(s) IV Continuous <Continuous>      Allergies:No Known Allergies        Labs:                           8.5    14.28 )-----------( 199      ( 28 Jul 2022 03:19 )             25.0     PT/INR - ( 28 Jul 2022 03:19 )   PT: 20.3 sec;   INR: 1.70          PTT - ( 28 Jul 2022 03:19 )  PTT:29.5 sec  07-28    134<L>  |  98  |  18  ----------------------------<  136<H>  4.3   |  27  |  1.12    Ca    8.2<L>      28 Jul 2022 03:19  Phos  3.1     07-28  Mg     2.2     07-28    TPro  5.6<L>  /  Alb  3.0<L>  /  TBili  0.8  /  DBili  x   /  AST  34  /  ALT  16  /  AlkPhos  50  07-28          Imaging Findings:  CT 7/28 showing New moderate hemopericardium.

## 2022-07-28 NOTE — PROGRESS NOTE ADULT - SUBJECTIVE AND OBJECTIVE BOX
CTICU  CRITICAL  CARE  attending     Hand off received 					   Pertinent clinical, laboratory, radiographic, hemodynamic, echocardiographic, respiratory data, microbiologic data and chart were reviewed and analyzed frequently throughout the course of the day and night  Patient seen and examined with CTS/ SH attending at bedside  Pt is a 57y , Male, HEALTH ISSUES - PROBLEM Dx:      , FAMILY HISTORY:  PAST MEDICAL & SURGICAL HISTORY:    Patient is a 57y old  Male who presents with a chief complaint of CAD (28 Jul 2022 11:36)      14 system review limited by mentation and multiorgan morbidity     Vital signs, hemodynamic and respiratory parameters were reviewed from the bedside nursing flowsheet.  ICU Vital Signs Last 24 Hrs  T(C): 37.3 (28 Jul 2022 17:13), Max: 37.3 (28 Jul 2022 17:13)  T(F): 99.1 (28 Jul 2022 17:13), Max: 99.1 (28 Jul 2022 17:13)  HR: 96 (28 Jul 2022 20:00) (88 - 108)  BP: 120/56 (28 Jul 2022 20:00) (102/61 - 134/75)  BP(mean): 80 (28 Jul 2022 20:00) (74 - 98)  ABP: --  ABP(mean): --  RR: 18 (28 Jul 2022 20:00) (15 - 20)  SpO2: 97% (28 Jul 2022 20:00) (91% - 100%)    O2 Parameters below as of 28 Jul 2022 21:00  Patient On (Oxygen Delivery Method): nasal cannula w/ humidification  O2 Flow (L/min): 2        Adult Advanced Hemodynamics Last 24 Hrs  CVP(mm Hg): 4 (28 Jul 2022 09:00) (4 - 17)  CVP(cm H2O): --  CO: --  CI: --  PA: --  PA(mean): --  PCWP: --  SVR: --  SVRI: --  PVR: --  PVRI: --, ABG - ( 27 Jul 2022 13:47 )  pH, Arterial: 7.49  pH, Blood: x     /  pCO2: 28    /  pO2: 105   / HCO3: 21    / Base Excess: -0.9  /  SaO2: 99.1                Intake and output was reviewed and the fluid balance was calculated  Daily     Daily   I&O's Summary    27 Jul 2022 07:01  -  28 Jul 2022 07:00  --------------------------------------------------------  IN: 2240 mL / OUT: 3720 mL / NET: -1480 mL    28 Jul 2022 07:01  -  28 Jul 2022 20:53  --------------------------------------------------------  IN: 580 mL / OUT: 985 mL / NET: -405 mL        All lines and drain sites were assessed  [facility]      POCT Blood Glucose.: 155 mg/dL (28 Jul 2022 17:22)    No acute change in focality  Auscultation of the chest reveals equal bs  Abdomen is soft  Extremities are warm and well perfused  Wounds appear clean and unremarkable  Antibiotics are periop    labs  CBC Full  -  ( 28 Jul 2022 12:00 )  WBC Count : 16.58 K/uL  RBC Count : 3.04 M/uL  Hemoglobin : 8.9 g/dL  Hematocrit : 26.3 %  Platelet Count - Automated : 248 K/uL  Mean Cell Volume : 86.5 fl  Mean Cell Hemoglobin : 29.3 pg  Mean Cell Hemoglobin Concentration : 33.8 gm/dL  Auto Neutrophil # : x  Auto Lymphocyte # : x  Auto Monocyte # : x  Auto Eosinophil # : x  Auto Basophil # : x  Auto Neutrophil % : x  Auto Lymphocyte % : x  Auto Monocyte % : x  Auto Eosinophil % : x  Auto Basophil % : x    07-28    134<L>  |  99  |  23  ----------------------------<  138<H>  4.4   |  26  |  1.08    Ca    8.5      28 Jul 2022 12:00  Phos  2.6     07-28  Mg     2.3     07-28    TPro  6.1  /  Alb  3.4  /  TBili  0.8  /  DBili  x   /  AST  42<H>  /  ALT  20  /  AlkPhos  65  07-28    PT/INR - ( 28 Jul 2022 12:00 )   PT: 19.8 sec;   INR: 1.66          PTT - ( 28 Jul 2022 12:00 )  PTT:28.7 sec  The current medications were reviewed   MEDICATIONS  (STANDING):  albumin human 25% IVPB 50 milliLiter(s) IV Intermittent every 10 minutes  aspirin  chewable 81 milliGRAM(s) Oral daily  atorvastatin 40 milliGRAM(s) Oral at bedtime  chlorhexidine 2% Cloths 1 Application(s) Topical daily  dextrose 5%. 1000 milliLiter(s) (100 mL/Hr) IV Continuous <Continuous>  dextrose 5%. 1000 milliLiter(s) (50 mL/Hr) IV Continuous <Continuous>  dextrose 50% Injectable 25 Gram(s) IV Push once  dextrose 50% Injectable 12.5 Gram(s) IV Push once  dextrose 50% Injectable 25 Gram(s) IV Push once  gabapentin 300 milliGRAM(s) Oral every 8 hours  glucagon  Injectable 1 milliGRAM(s) IntraMuscular once  heparin   Injectable 5000 Unit(s) SubCutaneous every 8 hours  insulin lispro (ADMELOG) corrective regimen sliding scale   SubCutaneous Before meals and at bedtime  LORazepam   Injectable 1 milliGRAM(s) IV Push once  metoprolol tartrate 12.5 milliGRAM(s) Oral every 6 hours  pantoprazole    Tablet 40 milliGRAM(s) Oral before breakfast  polyethylene glycol 3350 17 Gram(s) Oral daily  sodium chloride 0.9%. 1000 milliLiter(s) (10 mL/Hr) IV Continuous <Continuous>    MEDICATIONS  (PRN):  dextrose Oral Gel 15 Gram(s) Oral once PRN Blood Glucose LESS THAN 70 milliGRAM(s)/deciliter  melatonin 5 milliGRAM(s) Oral at bedtime PRN Insomnia  oxyCODONE    IR 5 milliGRAM(s) Oral every 4 hours PRN Moderate Pain (4 - 6)  oxyCODONE    IR 10 milliGRAM(s) Oral every 6 hours PRN Severe Pain (7 - 10)       PROBLEM LIST/ ASSESSMENT:  HEALTH ISSUES - PROBLEM Dx:      ,   Patient is a 57y old  Male who presents with a chief complaint of CAD (28 Jul 2022 11:36)     s/p cardiac surgery                My plan includes :  close hemodynamic, ventilatory and drain monitoring and management per post op routine    Monitor for arrhythmias and monitor parameters for organ perfusion  beta blockade not administered due to hemodynamic instability and bradycardia  monitor neurologic status  Head of the bed should remain elevated to 45 deg .   chest PT and IS will be encouraged  monitor adequacy of oxygenation and ventilation and attempt to wean oxygen  antibiotic regimen will be tailored to the clinical, laboratory and microbiologic data  Nutritional goals will be met using po eventually , ensure adequate caloric intake and montior the same  Stress ulcer and VTE prophylaxis will be achieved    Glycemic control is satisfactory  Electrolytes have been repleted as necessary and wound care has been carried out. Pain control has been achieved.   agressive physical therapy and early mobility and ambulation goals will be met   The family was updated about the course and plan  CRITICAL CARE TIME personally provided by me  in evaluation and management, reassessments, review and interpretation of labs and x-rays, ventilator and hemodynamic management, formulating a plan and coordinating care: ___90____ MIN.  Time does not include procedural time. Time spent was non routine post-operarive caRE and included multiple and repeated evaluations at the bedside  CTICU ATTENDING     					    Jas Britt MD

## 2022-07-28 NOTE — PROGRESS NOTE ADULT - SUBJECTIVE AND OBJECTIVE BOX
CTICU  CRITICAL  CARE  attending     Hand off received 					   Pertinent clinical, laboratory, radiographic, hemodynamic, echocardiographic, respiratory data, microbiologic data and chart were reviewed and analyzed frequently throughout the course of the day and night      57 years old male with recent COVID infection 3 weeks ago.  He initially presented to [facility] ED on 7/19 with exertional chest pain X1 week, relieved with rest.   In the ED patient noted to have elevated troponin and was ruled in for NSTEMI.   He was given aspirin and brilinta and taken to the cath lab. Cath revealed 3VCAD: LAD 90%, D1 80%, LCx 90%, OM1 100% thrombotic occlusion with collaterals from RCA, RPDA 80%.   IABP was placed and patient was brought to the ICU post cath. IABP was removed the next day and decision made to transfer to [facility] under the care of Dr. Bran for CABG.  S/P CABG x 4.       FAMILY HISTORY:  PAST MEDICAL & SURGICAL HISTORY:        14 system review was unremarkable    Vital signs, hemodynamic and respiratory parameters were reviewed from the bedside nursing flow sheet.  ICU Vital Signs Last 24 Hrs  T(C): 36.4 (28 Jul 2022 22:28), Max: 37.3 (28 Jul 2022 17:13)  T(F): 97.5 (28 Jul 2022 22:28), Max: 99.1 (28 Jul 2022 17:13)  HR: 97 (28 Jul 2022 23:00) (88 - 108)  BP: 105/69 (28 Jul 2022 23:00) (102/61 - 134/75)  BP(mean): 82 (28 Jul 2022 23:00) (74 - 98)  ABP: --  ABP(mean): --  RR: 20 (28 Jul 2022 23:00) (15 - 25)  SpO2: 95% (28 Jul 2022 23:00) (91% - 100%)    O2 Parameters below as of 28 Jul 2022 23:00  Patient On (Oxygen Delivery Method): nasal cannula w/ humidification  O2 Flow (L/min): 2        Adult Advanced Hemodynamics Last 24 Hrs  CVP(mm Hg): 4 (28 Jul 2022 09:00) (4 - 17)  CVP(cm H2O): --  CO: --  CI: --  PA: --  PA(mean): --  PCWP: --  SVR: --  SVRI: --  PVR: --  PVRI: --, ABG - ( 27 Jul 2022 13:47 )  pH, Arterial: 7.49  pH, Blood: x     /  pCO2: 28    /  pO2: 105   / HCO3: 21    / Base Excess: -0.9  /  SaO2: 99.1                Intake and output was reviewed and the fluid balance was calculated  Daily     Daily   I&O's Summary    27 Jul 2022 07:01  -  28 Jul 2022 07:00  --------------------------------------------------------  IN: 2240 mL / OUT: 3720 mL / NET: -1480 mL    28 Jul 2022 07:01  -  28 Jul 2022 23:14  --------------------------------------------------------  IN: 680 mL / OUT: 1000 mL / NET: -320 mL        All lines and drain sites were assessed    Neuro: No change in the mental status from the baseline. Follows commands. Moves all 4 extremities.  Neck: No JVD.  CVS: S1, S2, No S3.  Lungs: Good air entry bilaterally.  Abd: Soft. No tenderness. + Bowel sounds.  Vascular: + DP/PT.  Extremities: No edema.  Lymphatic: Normal.  Skin: No abnormalities.      labs  CBC Full  -  ( 28 Jul 2022 12:00 )  WBC Count : 16.58 K/uL  RBC Count : 3.04 M/uL  Hemoglobin : 8.9 g/dL  Hematocrit : 26.3 %  Platelet Count - Automated : 248 K/uL  Mean Cell Volume : 86.5 fl  Mean Cell Hemoglobin : 29.3 pg  Mean Cell Hemoglobin Concentration : 33.8 gm/dL  Auto Neutrophil # : x  Auto Lymphocyte # : x  Auto Monocyte # : x  Auto Eosinophil # : x  Auto Basophil # : x  Auto Neutrophil % : x  Auto Lymphocyte % : x  Auto Monocyte % : x  Auto Eosinophil % : x  Auto Basophil % : x    07-28    134<L>  |  99  |  23  ----------------------------<  138<H>  4.4   |  26  |  1.08    Ca    8.5      28 Jul 2022 12:00  Phos  2.6     07-28  Mg     2.3     07-28    TPro  6.1  /  Alb  3.4  /  TBili  0.8  /  DBili  x   /  AST  42<H>  /  ALT  20  /  AlkPhos  65  07-28    PT/INR - ( 28 Jul 2022 12:00 )   PT: 19.8 sec;   INR: 1.66          PTT - ( 28 Jul 2022 12:00 )  PTT:28.7 sec  The current medications were reviewed   MEDICATIONS  (STANDING):  aspirin  chewable 81 milliGRAM(s) Oral daily  atorvastatin 40 milliGRAM(s) Oral at bedtime  chlorhexidine 2% Cloths 1 Application(s) Topical daily  dextrose 5%. 1000 milliLiter(s) (100 mL/Hr) IV Continuous <Continuous>  dextrose 5%. 1000 milliLiter(s) (50 mL/Hr) IV Continuous <Continuous>  dextrose 50% Injectable 25 Gram(s) IV Push once  dextrose 50% Injectable 12.5 Gram(s) IV Push once  dextrose 50% Injectable 25 Gram(s) IV Push once  gabapentin 300 milliGRAM(s) Oral every 8 hours  glucagon  Injectable 1 milliGRAM(s) IntraMuscular once  heparin   Injectable 5000 Unit(s) SubCutaneous every 8 hours  insulin lispro (ADMELOG) corrective regimen sliding scale   SubCutaneous Before meals and at bedtime  metoprolol tartrate 12.5 milliGRAM(s) Oral every 6 hours  pantoprazole    Tablet 40 milliGRAM(s) Oral before breakfast  polyethylene glycol 3350 17 Gram(s) Oral daily  sodium chloride 0.9%. 1000 milliLiter(s) (10 mL/Hr) IV Continuous <Continuous>    MEDICATIONS  (PRN):  acetaminophen     Tablet .. 650 milliGRAM(s) Oral every 6 hours PRN Mild Pain (1 - 3)  dextrose Oral Gel 15 Gram(s) Oral once PRN Blood Glucose LESS THAN 70 milliGRAM(s)/deciliter  melatonin 5 milliGRAM(s) Oral at bedtime PRN Insomnia  oxyCODONE    IR 5 milliGRAM(s) Oral every 4 hours PRN Moderate Pain (4 - 6)  oxyCODONE    IR 10 milliGRAM(s) Oral every 6 hours PRN Severe Pain (7 - 10)        57y old  Male admitted with triple vessel CAD  S/P CABG x 4.   Postoperative course complicated by pericardial effusion.  S/P Drainage of pericardial effusion.   Hemodynamically stable.  Good oxygenation.  Fair urine out put.  Overall doing well.      My plan includes :  Statin and Betablocker.  Close hemodynamic, ventilatory and drain monitoring and management  Monitor for arrhythmias and monitor parameters for organ perfusion  Monitor neurologic status  Monitor renal function.  Head of the bed should remain elevated to 45 deg .   Chest PT and IS will be encouraged  Monitor adequacy of oxygenation and ventilation and attempt to wean oxygen  Nutritional goals will be met using po eventually , ensure adequate caloric intake and monitor the same  Stress ulcer and VTE prophylaxis will be achieved    Glycemic control is satisfactory  Electrolytes have been repleted as necessary and wound care has been carried out. Pain control has been achieved.   Aggressive physical therapy and early mobility and ambulation goals will be met   The family was updated about the course and plan  CRITICAL CARE TIME SPENT in evaluation and management, reassessments, review and interpretation of labs and x-rays, ventilator and hemodynamic management, formulating a plan and coordinating care: ___30____ MIN.  Time does not include procedural time.  CTICU ATTENDING     					    Domingo Gates MD

## 2022-07-29 LAB
ALBUMIN SERPL ELPH-MCNC: 2.9 G/DL — LOW (ref 3.3–5)
ALP SERPL-CCNC: 62 U/L — SIGNIFICANT CHANGE UP (ref 40–120)
ALT FLD-CCNC: 25 U/L — SIGNIFICANT CHANGE UP (ref 10–45)
ANION GAP SERPL CALC-SCNC: 9 MMOL/L — SIGNIFICANT CHANGE UP (ref 5–17)
APTT BLD: 29.8 SEC — SIGNIFICANT CHANGE UP (ref 27.5–35.5)
AST SERPL-CCNC: 38 U/L — SIGNIFICANT CHANGE UP (ref 10–40)
BILIRUB SERPL-MCNC: 0.8 MG/DL — SIGNIFICANT CHANGE UP (ref 0.2–1.2)
BUN SERPL-MCNC: 21 MG/DL — SIGNIFICANT CHANGE UP (ref 7–23)
CALCIUM SERPL-MCNC: 8.2 MG/DL — LOW (ref 8.4–10.5)
CHLORIDE SERPL-SCNC: 98 MMOL/L — SIGNIFICANT CHANGE UP (ref 96–108)
CO2 SERPL-SCNC: 26 MMOL/L — SIGNIFICANT CHANGE UP (ref 22–31)
CREAT SERPL-MCNC: 1.01 MG/DL — SIGNIFICANT CHANGE UP (ref 0.5–1.3)
EGFR: 88 ML/MIN/1.73M2 — SIGNIFICANT CHANGE UP
GLUCOSE BLDC GLUCOMTR-MCNC: 101 MG/DL — HIGH (ref 70–99)
GLUCOSE BLDC GLUCOMTR-MCNC: 109 MG/DL — HIGH (ref 70–99)
GLUCOSE BLDC GLUCOMTR-MCNC: 127 MG/DL — HIGH (ref 70–99)
GLUCOSE SERPL-MCNC: 109 MG/DL — HIGH (ref 70–99)
HCT VFR BLD CALC: 26.4 % — LOW (ref 39–50)
HGB BLD-MCNC: 8.8 G/DL — LOW (ref 13–17)
INR BLD: 1.51 — HIGH (ref 0.88–1.16)
MAGNESIUM SERPL-MCNC: 2.2 MG/DL — SIGNIFICANT CHANGE UP (ref 1.6–2.6)
MCHC RBC-ENTMCNC: 29.4 PG — SIGNIFICANT CHANGE UP (ref 27–34)
MCHC RBC-ENTMCNC: 33.3 GM/DL — SIGNIFICANT CHANGE UP (ref 32–36)
MCV RBC AUTO: 88.3 FL — SIGNIFICANT CHANGE UP (ref 80–100)
NRBC # BLD: 0 /100 WBCS — SIGNIFICANT CHANGE UP (ref 0–0)
PHOSPHATE SERPL-MCNC: 2.5 MG/DL — SIGNIFICANT CHANGE UP (ref 2.5–4.5)
PLATELET # BLD AUTO: 235 K/UL — SIGNIFICANT CHANGE UP (ref 150–400)
POTASSIUM SERPL-MCNC: 4.1 MMOL/L — SIGNIFICANT CHANGE UP (ref 3.5–5.3)
POTASSIUM SERPL-SCNC: 4.1 MMOL/L — SIGNIFICANT CHANGE UP (ref 3.5–5.3)
PROT SERPL-MCNC: 5.6 G/DL — LOW (ref 6–8.3)
PROTHROM AB SERPL-ACNC: 18 SEC — HIGH (ref 10.5–13.4)
RBC # BLD: 2.99 M/UL — LOW (ref 4.2–5.8)
RBC # FLD: 15.8 % — HIGH (ref 10.3–14.5)
SODIUM SERPL-SCNC: 133 MMOL/L — LOW (ref 135–145)
SPECIMEN SOURCE FLD: SIGNIFICANT CHANGE UP
WBC # BLD: 12.39 K/UL — HIGH (ref 3.8–10.5)
WBC # FLD AUTO: 12.39 K/UL — HIGH (ref 3.8–10.5)

## 2022-07-29 PROCEDURE — 71045 X-RAY EXAM CHEST 1 VIEW: CPT | Mod: 26,77

## 2022-07-29 PROCEDURE — 99233 SBSQ HOSP IP/OBS HIGH 50: CPT

## 2022-07-29 PROCEDURE — 71045 X-RAY EXAM CHEST 1 VIEW: CPT | Mod: 26

## 2022-07-29 RX ORDER — SENNA PLUS 8.6 MG/1
2 TABLET ORAL AT BEDTIME
Refills: 0 | Status: DISCONTINUED | OUTPATIENT
Start: 2022-07-29 | End: 2022-08-01

## 2022-07-29 RX ORDER — METOPROLOL TARTRATE 50 MG
12.5 TABLET ORAL ONCE
Refills: 0 | Status: DISCONTINUED | OUTPATIENT
Start: 2022-07-29 | End: 2022-07-29

## 2022-07-29 RX ORDER — METOPROLOL TARTRATE 50 MG
12.5 TABLET ORAL ONCE
Refills: 0 | Status: COMPLETED | OUTPATIENT
Start: 2022-07-29 | End: 2022-07-29

## 2022-07-29 RX ORDER — SODIUM CHLORIDE 9 MG/ML
3 INJECTION INTRAMUSCULAR; INTRAVENOUS; SUBCUTANEOUS EVERY 8 HOURS
Refills: 0 | Status: DISCONTINUED | OUTPATIENT
Start: 2022-07-29 | End: 2022-08-01

## 2022-07-29 RX ORDER — METOPROLOL TARTRATE 50 MG
25 TABLET ORAL EVERY 6 HOURS
Refills: 0 | Status: DISCONTINUED | OUTPATIENT
Start: 2022-07-29 | End: 2022-07-30

## 2022-07-29 RX ADMIN — HEPARIN SODIUM 5000 UNIT(S): 5000 INJECTION INTRAVENOUS; SUBCUTANEOUS at 05:14

## 2022-07-29 RX ADMIN — GABAPENTIN 300 MILLIGRAM(S): 400 CAPSULE ORAL at 05:14

## 2022-07-29 RX ADMIN — Medication 25 MILLIGRAM(S): at 23:30

## 2022-07-29 RX ADMIN — Medication 12.5 MILLIGRAM(S): at 00:10

## 2022-07-29 RX ADMIN — ATORVASTATIN CALCIUM 40 MILLIGRAM(S): 80 TABLET, FILM COATED ORAL at 21:13

## 2022-07-29 RX ADMIN — Medication 25 MILLIGRAM(S): at 11:18

## 2022-07-29 RX ADMIN — HEPARIN SODIUM 5000 UNIT(S): 5000 INJECTION INTRAVENOUS; SUBCUTANEOUS at 21:13

## 2022-07-29 RX ADMIN — Medication 81 MILLIGRAM(S): at 11:18

## 2022-07-29 RX ADMIN — GABAPENTIN 300 MILLIGRAM(S): 400 CAPSULE ORAL at 14:05

## 2022-07-29 RX ADMIN — Medication 12.5 MILLIGRAM(S): at 05:14

## 2022-07-29 RX ADMIN — GABAPENTIN 300 MILLIGRAM(S): 400 CAPSULE ORAL at 21:13

## 2022-07-29 RX ADMIN — OXYCODONE HYDROCHLORIDE 5 MILLIGRAM(S): 5 TABLET ORAL at 23:29

## 2022-07-29 RX ADMIN — POLYETHYLENE GLYCOL 3350 17 GRAM(S): 17 POWDER, FOR SOLUTION ORAL at 11:20

## 2022-07-29 RX ADMIN — Medication 12.5 MILLIGRAM(S): at 06:33

## 2022-07-29 RX ADMIN — Medication 25 MILLIGRAM(S): at 17:04

## 2022-07-29 RX ADMIN — HEPARIN SODIUM 5000 UNIT(S): 5000 INJECTION INTRAVENOUS; SUBCUTANEOUS at 14:05

## 2022-07-29 RX ADMIN — PANTOPRAZOLE SODIUM 40 MILLIGRAM(S): 20 TABLET, DELAYED RELEASE ORAL at 05:14

## 2022-07-29 NOTE — PROGRESS NOTE ADULT - SUBJECTIVE AND OBJECTIVE BOX
SUBJECTIVE: Patient seen sitting up in chair in good spirits. No CP, SOB, fevers or chills. Pain controlled.     Vital Signs Last 24 Hrs  T(C): 36.6 (29 Jul 2022 17:26), Max: 37.7 (29 Jul 2022 01:00)  T(F): 97.9 (29 Jul 2022 17:26), Max: 99.9 (29 Jul 2022 01:00)  HR: 98 (29 Jul 2022 17:00) (90 - 105)  BP: 126/64 (29 Jul 2022 17:00) (105/57 - 146/79)  BP(mean): 88 (29 Jul 2022 17:00) (75 - 103)  RR: 18 (29 Jul 2022 17:00) (18 - 32)  SpO2: 97% (29 Jul 2022 17:00) (93% - 99%)    Parameters below as of 29 Jul 2022 17:00  Patient On (Oxygen Delivery Method): nasal cannula w/ humidification  O2 Flow (L/min): 2      Gen: NAD, resting comfortably in bed  C/V: NSR, chest incision with provena vac in place  Pulm: Nonlabored breathing, no respiratory distress  Abd: soft, NT/ND  Extrem: WWP, no calf edema, SCDs in place. R fem cutdown soft with some ecchymosis, dressing C/D/I, ASHU x1 with minimal serosanguinous output  Vascular: palpable DP + PT bilaterally   Neuro: Sensory and motor function intact  Lines/drains/tubes:    I&O's Summary    28 Jul 2022 07:01  -  29 Jul 2022 07:00  --------------------------------------------------------  IN: 930 mL / OUT: 1990 mL / NET: -1060 mL    29 Jul 2022 07:01  -  29 Jul 2022 18:12  --------------------------------------------------------  IN: 230 mL / OUT: 1421 mL / NET: -1191 mL        LABS:                        8.8    12.39 )-----------( 235      ( 29 Jul 2022 03:57 )             26.4     07-29    133<L>  |  98  |  21  ----------------------------<  109<H>  4.1   |  26  |  1.01    Ca    8.2<L>      29 Jul 2022 03:57  Phos  2.5     07-29  Mg     2.2     07-29    TPro  5.6<L>  /  Alb  2.9<L>  /  TBili  0.8  /  DBili  x   /  AST  38  /  ALT  25  /  AlkPhos  62  07-29    PT/INR - ( 29 Jul 2022 03:57 )   PT: 18.0 sec;   INR: 1.51          PTT - ( 29 Jul 2022 03:57 )  PTT:29.8 sec    CAPILLARY BLOOD GLUCOSE      POCT Blood Glucose.: 101 mg/dL (29 Jul 2022 17:06)  POCT Blood Glucose.: 127 mg/dL (29 Jul 2022 11:26)  POCT Blood Glucose.: 109 mg/dL (29 Jul 2022 07:31)  POCT Blood Glucose.: 114 mg/dL (28 Jul 2022 22:06)    LIVER FUNCTIONS - ( 29 Jul 2022 03:57 )  Alb: 2.9 g/dL / Pro: 5.6 g/dL / ALK PHOS: 62 U/L / ALT: 25 U/L / AST: 38 U/L / GGT: x             RADIOLOGY & ADDITIONAL STUDIES:

## 2022-07-29 NOTE — PROGRESS NOTE ADULT - ASSESSMENT
56 y/o M w/ CAD s/p recent MI s/p CABG and R groin cutdown for pseudoaneurysm repair.      Plan:   Continue to get OOBA as tolerated  Monitor R groin wound/ASHU drain for signs of hematoma, harvest drain removed 7/28  Rest of care per CTICU team  Vascular will continue to follow

## 2022-07-29 NOTE — PROGRESS NOTE ADULT - SUBJECTIVE AND OBJECTIVE BOX
CTICU  CRITICAL  CARE  attending     Hand off received 					   Pertinent clinical, laboratory, radiographic, hemodynamic, echocardiographic, respiratory data, microbiologic data and chart were reviewed and analyzed frequently throughout the course of the day  Patient seen and examined with CTS/ SH attending at bedside  Pt is a 57 yr old male with no sig PMH tx for CABG eval.  Pericardial effusion on TTE, sp IR drainage 7/28. Incidentally found to be COVID positive from pre-op test.     FAMILY HISTORY:  PAST MEDICAL & SURGICAL HISTORY:    Patient is a 57 yr old male with no sig PMH tx for CABG eval.      14 system review was unremarkable    Vital signs, hemodynamic and respiratory parameters were reviewed from the bedside nursing flowsheet.  ICU Vital Signs Last 24 Hrs  T(C): 37.3 (29 Jul 2022 05:00), Max: 37.7 (29 Jul 2022 01:00)  T(F): 99.1 (29 Jul 2022 05:00), Max: 99.9 (29 Jul 2022 01:00)  HR: 97 (29 Jul 2022 08:00) (88 - 108)  BP: 110/58 (29 Jul 2022 08:00) (105/57 - 134/75)  BP(mean): 76 (29 Jul 2022 08:00) (75 - 98)  ABP: --  ABP(mean): --  RR: 27 (29 Jul 2022 08:00) (18 - 32)  SpO2: 94% (29 Jul 2022 08:00) (91% - 98%)    O2 Parameters below as of 29 Jul 2022 08:00  Patient On (Oxygen Delivery Method): nasal cannula w/ humidification  O2 Flow (L/min): 2    Intake and output was reviewed and the fluid balance was calculated  Daily     Daily   I&O's Summary    28 Jul 2022 07:01  -  29 Jul 2022 07:00  --------------------------------------------------------  IN: 930 mL / OUT: 1990 mL / NET: -1060 mL    29 Jul 2022 07:01  -  29 Jul 2022 08:49  --------------------------------------------------------  IN: 0 mL / OUT: 31 mL / NET: -31 mL        All lines and drain sites were assessed  Glycemic trend was reviewed      POCT Blood Glucose.: 109 mg/dL (29 Jul 2022 07:31)      Neuro:  HEENT:  Heart:  Lungs:  Abdomen:  Extremities:      labs  CBC Full  -  ( 29 Jul 2022 03:57 )  WBC Count : 12.39 K/uL  RBC Count : 2.99 M/uL  Hemoglobin : 8.8 g/dL  Hematocrit : 26.4 %  Platelet Count - Automated : 235 K/uL  Mean Cell Volume : 88.3 fl  Mean Cell Hemoglobin : 29.4 pg  Mean Cell Hemoglobin Concentration : 33.3 gm/dL  Auto Neutrophil # : x  Auto Lymphocyte # : x  Auto Monocyte # : x  Auto Eosinophil # : x  Auto Basophil # : x  Auto Neutrophil % : x  Auto Lymphocyte % : x  Auto Monocyte % : x  Auto Eosinophil % : x  Auto Basophil % : x    07-29    133<L>  |  98  |  21  ----------------------------<  109<H>  4.1   |  26  |  1.01    Ca    8.2<L>      29 Jul 2022 03:57  Phos  2.5     07-29  Mg     2.2     07-29    TPro  5.6<L>  /  Alb  2.9<L>  /  TBili  0.8  /  DBili  x   /  AST  38  /  ALT  25  /  AlkPhos  62  07-29    PT/INR - ( 29 Jul 2022 03:57 )   PT: 18.0 sec;   INR: 1.51          PTT - ( 29 Jul 2022 03:57 )  PTT:29.8 sec  The current medications were reviewed   MEDICATIONS  (STANDING):  aspirin  chewable 81 milliGRAM(s) Oral daily  atorvastatin 40 milliGRAM(s) Oral at bedtime  chlorhexidine 2% Cloths 1 Application(s) Topical daily  dextrose 5%. 1000 milliLiter(s) (50 mL/Hr) IV Continuous <Continuous>  dextrose 5%. 1000 milliLiter(s) (100 mL/Hr) IV Continuous <Continuous>  dextrose 50% Injectable 25 Gram(s) IV Push once  dextrose 50% Injectable 12.5 Gram(s) IV Push once  dextrose 50% Injectable 25 Gram(s) IV Push once  gabapentin 300 milliGRAM(s) Oral every 8 hours  glucagon  Injectable 1 milliGRAM(s) IntraMuscular once  heparin   Injectable 5000 Unit(s) SubCutaneous every 8 hours  insulin lispro (ADMELOG) corrective regimen sliding scale   SubCutaneous Before meals and at bedtime  metoprolol tartrate 25 milliGRAM(s) Oral every 6 hours  pantoprazole    Tablet 40 milliGRAM(s) Oral before breakfast  polyethylene glycol 3350 17 Gram(s) Oral daily  sodium chloride 0.9%. 1000 milliLiter(s) (10 mL/Hr) IV Continuous <Continuous>    MEDICATIONS  (PRN):  acetaminophen     Tablet .. 650 milliGRAM(s) Oral every 6 hours PRN Mild Pain (1 - 3)  dextrose Oral Gel 15 Gram(s) Oral once PRN Blood Glucose LESS THAN 70 milliGRAM(s)/deciliter  melatonin 5 milliGRAM(s) Oral at bedtime PRN Insomnia  oxyCODONE    IR 5 milliGRAM(s) Oral every 4 hours PRN Moderate Pain (4 - 6)  oxyCODONE    IR 10 milliGRAM(s) Oral every 6 hours PRN Severe Pain (7 - 10)      Assessment/Plan:  57 yr old male with no sig PMH tx for CABG eval.    POD 4 CABG x 4 (LIMA-LAD, SVG-PDA, SVG-OM, SVG-Dg; EF 45%, Scheinerman 7/25) with repair of femoral pseudoaneurysm likely post IABP  Vasogenic shock required levophed, now off  Acute post operative anemia required pRBCs tx-trend  Pericardial effusion, loculated sp IR drain 7/28, follow cx  RLE bruising-monitor  Aspirin  Statin  Metoprolol  Pain control  Diet as tolerated  Replete lytes prn  Monitor CT output  GI/DVT PPX  Bowel Regimen  Pain control  OOB with PT    Close hemodynamic, ventilatory and drain monitoring and management per post op routine  Monitor for arrhythmias and monitor parameters for organ perfusion  Monitor neurologic status  Head of the bed should remain elevated to 45 deg   Chest PT and IS will be encouraged  Monitor adequacy of oxygenation and ventilation and attempt to wean oxygen  Antibiotic regimen will be tailored to the clinical, laboratory and microbiologic data  Nutritional goals will be met using po eventually, ensure adequate caloric intake and monitor the same  Stress ulcer and VTE prophylaxis will be achieved    Glycemic control is satisfactory  Electrolytes have been repleted as necessary and wound care has been carried out   Pain control has been achieved.   Aggressive physical therapy and early mobility and ambulation goals will be met   The family was updated about the course and plan.    CRITICAL CARE TIME personally provided by me  in evaluation and management, reassessments, review and interpretation of labs and x-rays, ventilator and hemodynamic management, formulating a plan and coordinating care: ___35____ MIN.  Time does not include procedural time.    CTICU ATTENDING     					  Brea Parnell MD CTICU  CRITICAL  CARE  attending     Hand off received 					   Pertinent clinical, laboratory, radiographic, hemodynamic, echocardiographic, respiratory data, microbiologic data and chart were reviewed and analyzed frequently throughout the course of the day  Patient seen and examined with CTS/ SH attending at bedside  Pt is a 57 yr old male with no sig PMH tx for CABG eval.  Pericardial effusion on TTE, sp IR drainage 7/28. Incidentally found to be COVID positive from pre-op test.     FAMILY HISTORY:  PAST MEDICAL & SURGICAL HISTORY:    Patient is a 57 yr old male with no sig PMH tx for CABG eval.      14 system review was unremarkable    Vital signs, hemodynamic and respiratory parameters were reviewed from the bedside nursing flowsheet.  ICU Vital Signs Last 24 Hrs  T(C): 37.3 (29 Jul 2022 05:00), Max: 37.7 (29 Jul 2022 01:00)  T(F): 99.1 (29 Jul 2022 05:00), Max: 99.9 (29 Jul 2022 01:00)  HR: 97 (29 Jul 2022 08:00) (88 - 108)  BP: 110/58 (29 Jul 2022 08:00) (105/57 - 134/75)  BP(mean): 76 (29 Jul 2022 08:00) (75 - 98)  ABP: --  ABP(mean): --  RR: 27 (29 Jul 2022 08:00) (18 - 32)  SpO2: 94% (29 Jul 2022 08:00) (91% - 98%)    O2 Parameters below as of 29 Jul 2022 08:00  Patient On (Oxygen Delivery Method): nasal cannula w/ humidification  O2 Flow (L/min): 2    Intake and output was reviewed and the fluid balance was calculated  Daily     Daily   I&O's Summary    28 Jul 2022 07:01  -  29 Jul 2022 07:00  --------------------------------------------------------  IN: 930 mL / OUT: 1990 mL / NET: -1060 mL    29 Jul 2022 07:01  -  29 Jul 2022 08:49  --------------------------------------------------------  IN: 0 mL / OUT: 31 mL / NET: -31 mL        All lines and drain sites were assessed  Glycemic trend was reviewed      POCT Blood Glucose.: 109 mg/dL (29 Jul 2022 07:31)      Neuro: sitting in chair, pain improved  Heart: s1, s2, pericardial drain in place  Lungs: clear bl  Abdomen: soft, nt/nd  Extremities: ecchymotic R groin, soft,       labs  CBC Full  -  ( 29 Jul 2022 03:57 )  WBC Count : 12.39 K/uL  RBC Count : 2.99 M/uL  Hemoglobin : 8.8 g/dL  Hematocrit : 26.4 %  Platelet Count - Automated : 235 K/uL  Mean Cell Volume : 88.3 fl  Mean Cell Hemoglobin : 29.4 pg  Mean Cell Hemoglobin Concentration : 33.3 gm/dL  Auto Neutrophil # : x  Auto Lymphocyte # : x  Auto Monocyte # : x  Auto Eosinophil # : x  Auto Basophil # : x  Auto Neutrophil % : x  Auto Lymphocyte % : x  Auto Monocyte % : x  Auto Eosinophil % : x  Auto Basophil % : x    07-29    133<L>  |  98  |  21  ----------------------------<  109<H>  4.1   |  26  |  1.01    Ca    8.2<L>      29 Jul 2022 03:57  Phos  2.5     07-29  Mg     2.2     07-29    TPro  5.6<L>  /  Alb  2.9<L>  /  TBili  0.8  /  DBili  x   /  AST  38  /  ALT  25  /  AlkPhos  62  07-29    PT/INR - ( 29 Jul 2022 03:57 )   PT: 18.0 sec;   INR: 1.51          PTT - ( 29 Jul 2022 03:57 )  PTT:29.8 sec  The current medications were reviewed   MEDICATIONS  (STANDING):  aspirin  chewable 81 milliGRAM(s) Oral daily  atorvastatin 40 milliGRAM(s) Oral at bedtime  chlorhexidine 2% Cloths 1 Application(s) Topical daily  dextrose 5%. 1000 milliLiter(s) (50 mL/Hr) IV Continuous <Continuous>  dextrose 5%. 1000 milliLiter(s) (100 mL/Hr) IV Continuous <Continuous>  dextrose 50% Injectable 25 Gram(s) IV Push once  dextrose 50% Injectable 12.5 Gram(s) IV Push once  dextrose 50% Injectable 25 Gram(s) IV Push once  gabapentin 300 milliGRAM(s) Oral every 8 hours  glucagon  Injectable 1 milliGRAM(s) IntraMuscular once  heparin   Injectable 5000 Unit(s) SubCutaneous every 8 hours  insulin lispro (ADMELOG) corrective regimen sliding scale   SubCutaneous Before meals and at bedtime  metoprolol tartrate 25 milliGRAM(s) Oral every 6 hours  pantoprazole    Tablet 40 milliGRAM(s) Oral before breakfast  polyethylene glycol 3350 17 Gram(s) Oral daily  sodium chloride 0.9%. 1000 milliLiter(s) (10 mL/Hr) IV Continuous <Continuous>    MEDICATIONS  (PRN):  acetaminophen     Tablet .. 650 milliGRAM(s) Oral every 6 hours PRN Mild Pain (1 - 3)  dextrose Oral Gel 15 Gram(s) Oral once PRN Blood Glucose LESS THAN 70 milliGRAM(s)/deciliter  melatonin 5 milliGRAM(s) Oral at bedtime PRN Insomnia  oxyCODONE    IR 5 milliGRAM(s) Oral every 4 hours PRN Moderate Pain (4 - 6)  oxyCODONE    IR 10 milliGRAM(s) Oral every 6 hours PRN Severe Pain (7 - 10)      Assessment/Plan:  57 yr old male with no sig PMH tx for CABG eval.    POD 4 CABG x 4 (LIMA-LAD, SVG-PDA, SVG-OM, SVG-Dg; EF 45%, Scheinerman 7/25) with repair of femoral pseudoaneurysm likely post IABP  Acute post operative anemia required pRBCs tx-trend  Pericardial effusion, loculated sp IR drain 7/28, follow cx, monitor output  COVID positive-asymptomatic, isolation x 10d  RLE bruising-monitor  Aspirin  Statin  Metoprolol  Pain control  Diet as tolerated  Replete lytes prn  Dc pleural CT  GI/DVT PPX  Bowel Regimen  Pain control  OOB with PT    Close hemodynamic, ventilatory and drain monitoring and management per post op routine  Monitor for arrhythmias and monitor parameters for organ perfusion  Monitor neurologic status  Head of the bed should remain elevated to 45 deg   Chest PT and IS will be encouraged  Monitor adequacy of oxygenation and ventilation and attempt to wean oxygen  Antibiotic regimen will be tailored to the clinical, laboratory and microbiologic data  Nutritional goals will be met using po eventually, ensure adequate caloric intake and monitor the same  Stress ulcer and VTE prophylaxis will be achieved    Glycemic control is satisfactory  Electrolytes have been repleted as necessary and wound care has been carried out   Pain control has been achieved.   Aggressive physical therapy and early mobility and ambulation goals will be met   The family was updated about the course and plan.    CRITICAL CARE TIME personally provided by me  in evaluation and management, reassessments, review and interpretation of labs and x-rays, ventilator and hemodynamic management, formulating a plan and coordinating care: ___35____ MIN.  Time does not include procedural time.    CTICU ATTENDING     					  Brea Parnell MD

## 2022-07-29 NOTE — PROGRESS NOTE ADULT - SUBJECTIVE AND OBJECTIVE BOX
Assessment: 57M with no significant PMHx, recent COVID infection 3 weeks ago, who initially presented to [facility] ED on 7/19 with exertional chest pain X1 week, relieved with rest. In the ED patient noted to have elevated troponin and was ruled in for NSTEMI. He was given aspirin and Brilinta and taken to the cath lab. Cath revealed 3VCAD: LAD 90%, D1 80%, LCx 90%, OM1 100% thrombotic occlusion with collaterals from RCA, RPDA 80%. IABP was placed and patient was brought to the ICU post cath. IABP was removed the next day and decision made to transfer to [facility] under the care of Dr. Bran for surgical evaluation. S/p CABG x4 7/25, and repair of right common femoral artery pseudoaneurysm. 7/26 TTE Pericardial effusion found. CT 7/28 showing New moderate hemopericardium. IR consulted for pericardial drain placement completed 7/28/22    Dressing c/d/i  Minimal tenderness at site  Flushed easily with 2ccs NS  300 serosanguinous output in the last 24h since drain placement.     Continue to monitor output  IR will continue to follow

## 2022-07-30 LAB
ANION GAP SERPL CALC-SCNC: 8 MMOL/L — SIGNIFICANT CHANGE UP (ref 5–17)
APTT BLD: 27.7 SEC — SIGNIFICANT CHANGE UP (ref 27.5–35.5)
BUN SERPL-MCNC: 20 MG/DL — SIGNIFICANT CHANGE UP (ref 7–23)
CALCIUM SERPL-MCNC: 8.6 MG/DL — SIGNIFICANT CHANGE UP (ref 8.4–10.5)
CHLORIDE SERPL-SCNC: 98 MMOL/L — SIGNIFICANT CHANGE UP (ref 96–108)
CO2 SERPL-SCNC: 28 MMOL/L — SIGNIFICANT CHANGE UP (ref 22–31)
CREAT SERPL-MCNC: 1.1 MG/DL — SIGNIFICANT CHANGE UP (ref 0.5–1.3)
EGFR: 79 ML/MIN/1.73M2 — SIGNIFICANT CHANGE UP
GLUCOSE BLDC GLUCOMTR-MCNC: 115 MG/DL — HIGH (ref 70–99)
GLUCOSE BLDC GLUCOMTR-MCNC: 116 MG/DL — HIGH (ref 70–99)
GLUCOSE BLDC GLUCOMTR-MCNC: 140 MG/DL — HIGH (ref 70–99)
GLUCOSE BLDC GLUCOMTR-MCNC: 146 MG/DL — HIGH (ref 70–99)
GLUCOSE SERPL-MCNC: 117 MG/DL — HIGH (ref 70–99)
HCT VFR BLD CALC: 28.8 % — LOW (ref 39–50)
HGB BLD-MCNC: 9.3 G/DL — LOW (ref 13–17)
INR BLD: 1.36 — HIGH (ref 0.88–1.16)
MAGNESIUM SERPL-MCNC: 2.3 MG/DL — SIGNIFICANT CHANGE UP (ref 1.6–2.6)
MCHC RBC-ENTMCNC: 29 PG — SIGNIFICANT CHANGE UP (ref 27–34)
MCHC RBC-ENTMCNC: 32.3 GM/DL — SIGNIFICANT CHANGE UP (ref 32–36)
MCV RBC AUTO: 89.7 FL — SIGNIFICANT CHANGE UP (ref 80–100)
NRBC # BLD: 0 /100 WBCS — SIGNIFICANT CHANGE UP (ref 0–0)
PLATELET # BLD AUTO: 329 K/UL — SIGNIFICANT CHANGE UP (ref 150–400)
POTASSIUM SERPL-MCNC: 4.3 MMOL/L — SIGNIFICANT CHANGE UP (ref 3.5–5.3)
POTASSIUM SERPL-SCNC: 4.3 MMOL/L — SIGNIFICANT CHANGE UP (ref 3.5–5.3)
PROTHROM AB SERPL-ACNC: 16.2 SEC — HIGH (ref 10.5–13.4)
RBC # BLD: 3.21 M/UL — LOW (ref 4.2–5.8)
RBC # FLD: 16.5 % — HIGH (ref 10.3–14.5)
SODIUM SERPL-SCNC: 134 MMOL/L — LOW (ref 135–145)
WBC # BLD: 13.7 K/UL — HIGH (ref 3.8–10.5)
WBC # FLD AUTO: 13.7 K/UL — HIGH (ref 3.8–10.5)

## 2022-07-30 PROCEDURE — 71045 X-RAY EXAM CHEST 1 VIEW: CPT | Mod: 26

## 2022-07-30 RX ORDER — DIPHENHYDRAMINE HCL 50 MG
25 CAPSULE ORAL EVERY 6 HOURS
Refills: 0 | Status: DISCONTINUED | OUTPATIENT
Start: 2022-07-30 | End: 2022-08-01

## 2022-07-30 RX ORDER — METOPROLOL TARTRATE 50 MG
12.5 TABLET ORAL ONCE
Refills: 0 | Status: COMPLETED | OUTPATIENT
Start: 2022-07-30 | End: 2022-07-30

## 2022-07-30 RX ORDER — BACITRACIN ZINC 500 UNIT/G
1 OINTMENT IN PACKET (EA) TOPICAL DAILY
Refills: 0 | Status: DISCONTINUED | OUTPATIENT
Start: 2022-07-30 | End: 2022-08-01

## 2022-07-30 RX ORDER — METOPROLOL TARTRATE 50 MG
37.5 TABLET ORAL EVERY 6 HOURS
Refills: 0 | Status: DISCONTINUED | OUTPATIENT
Start: 2022-07-30 | End: 2022-07-31

## 2022-07-30 RX ADMIN — Medication 1 APPLICATION(S): at 13:44

## 2022-07-30 RX ADMIN — GABAPENTIN 300 MILLIGRAM(S): 400 CAPSULE ORAL at 21:25

## 2022-07-30 RX ADMIN — GABAPENTIN 300 MILLIGRAM(S): 400 CAPSULE ORAL at 12:13

## 2022-07-30 RX ADMIN — Medication 25 MILLIGRAM(S): at 12:12

## 2022-07-30 RX ADMIN — Medication 81 MILLIGRAM(S): at 11:25

## 2022-07-30 RX ADMIN — OXYCODONE HYDROCHLORIDE 5 MILLIGRAM(S): 5 TABLET ORAL at 10:15

## 2022-07-30 RX ADMIN — HEPARIN SODIUM 5000 UNIT(S): 5000 INJECTION INTRAVENOUS; SUBCUTANEOUS at 05:10

## 2022-07-30 RX ADMIN — OXYCODONE HYDROCHLORIDE 5 MILLIGRAM(S): 5 TABLET ORAL at 21:25

## 2022-07-30 RX ADMIN — OXYCODONE HYDROCHLORIDE 5 MILLIGRAM(S): 5 TABLET ORAL at 09:15

## 2022-07-30 RX ADMIN — Medication 25 MILLIGRAM(S): at 05:09

## 2022-07-30 RX ADMIN — SODIUM CHLORIDE 3 MILLILITER(S): 9 INJECTION INTRAMUSCULAR; INTRAVENOUS; SUBCUTANEOUS at 21:38

## 2022-07-30 RX ADMIN — SODIUM CHLORIDE 3 MILLILITER(S): 9 INJECTION INTRAMUSCULAR; INTRAVENOUS; SUBCUTANEOUS at 06:39

## 2022-07-30 RX ADMIN — CHLORHEXIDINE GLUCONATE 1 APPLICATION(S): 213 SOLUTION TOPICAL at 11:23

## 2022-07-30 RX ADMIN — PANTOPRAZOLE SODIUM 40 MILLIGRAM(S): 20 TABLET, DELAYED RELEASE ORAL at 05:09

## 2022-07-30 RX ADMIN — CHLORHEXIDINE GLUCONATE 1 APPLICATION(S): 213 SOLUTION TOPICAL at 00:07

## 2022-07-30 RX ADMIN — Medication 25 MILLIGRAM(S): at 11:25

## 2022-07-30 RX ADMIN — Medication 5 MILLIGRAM(S): at 13:44

## 2022-07-30 RX ADMIN — HEPARIN SODIUM 5000 UNIT(S): 5000 INJECTION INTRAVENOUS; SUBCUTANEOUS at 21:25

## 2022-07-30 RX ADMIN — SODIUM CHLORIDE 3 MILLILITER(S): 9 INJECTION INTRAMUSCULAR; INTRAVENOUS; SUBCUTANEOUS at 13:29

## 2022-07-30 RX ADMIN — GABAPENTIN 300 MILLIGRAM(S): 400 CAPSULE ORAL at 05:10

## 2022-07-30 RX ADMIN — Medication 5 MILLIGRAM(S): at 21:25

## 2022-07-30 RX ADMIN — OXYCODONE HYDROCHLORIDE 5 MILLIGRAM(S): 5 TABLET ORAL at 22:00

## 2022-07-30 RX ADMIN — OXYCODONE HYDROCHLORIDE 5 MILLIGRAM(S): 5 TABLET ORAL at 00:30

## 2022-07-30 RX ADMIN — SENNA PLUS 2 TABLET(S): 8.6 TABLET ORAL at 21:25

## 2022-07-30 RX ADMIN — HEPARIN SODIUM 5000 UNIT(S): 5000 INJECTION INTRAVENOUS; SUBCUTANEOUS at 13:44

## 2022-07-30 RX ADMIN — Medication 37.5 MILLIGRAM(S): at 17:39

## 2022-07-30 RX ADMIN — Medication 12.5 MILLIGRAM(S): at 12:12

## 2022-07-30 RX ADMIN — SODIUM CHLORIDE 3 MILLILITER(S): 9 INJECTION INTRAMUSCULAR; INTRAVENOUS; SUBCUTANEOUS at 00:07

## 2022-07-30 RX ADMIN — ATORVASTATIN CALCIUM 40 MILLIGRAM(S): 80 TABLET, FILM COATED ORAL at 21:24

## 2022-07-30 NOTE — PROGRESS NOTE ADULT - ASSESSMENT
57 y.o M with no significant PMHx, recent COVID infection 3 weeks ago, who initially presented to [facility] ED on 7/19 with exertional chest pain X1 week, relieved with rest. In the ED patient noted to have elevated troponin and was ruled in for NSTEMI. He was given aspirin and brilinta and taken to the cath lab. Cath revealed 3VCAD: LAD 90%, D1 80%, LCx 90%, OM1 100% thrombotic occlusion with collaterals from RCA, RPDA 80%. IABP was placed and patient was brought to the ICU post cath. IABP was removed the next day and decision made to transfer to [facility] under the care of Dr. Bran for surgical evaluation.   On 7/25/22 patient underwent CABG x4, repair of Right pseudoanuerysm.  Arrived on Levo and sulaiman.  Postoperative course complicated by right groin hematoma.  POD 1 received 2 units PRBC, TTE with small to mod loculated pericardial effusion.  POD 2 received 1 unit PRBC, CT chest with moderate pericardial effusion, IR placed pericardial drain with 250cc output.  Incidentally found to be covid +.  POD 3 Prevena dressing replaced, pleural tube removed, BB titrated, transferred to [facility].  POD 4 titrating BB, pericardial drain remains.    ==== Neurovascular ====  -No delirium, pain well managed on current regimen  -C/w PRNs for Pain control  -Monitor neuro status    ==== Respiratory ====  -Saturates well on RA     -AM CXR stable, repeat in AM  -Encourage IS 10x/hour while awake, Cough and deep breathing exercises  -Monitor respiratory status via SpO2    ==== Cardiovascular ====  Monitor on Tele  Continue aspirin 81mg QD  Continue Statin  Continue Lopressor 37.5mg Q6hrs    ==== GI ====   -Tolerating PO  -Prophylaxis: Protonix  -C/w bowel regimen    ==== /Renal ====  -BUN/Cr: 20/1.10  -Trend Cr on AM labs  -Replete electrolytes as needed    ==== ID ====   Afebrile, asymptomatic  -WBC: 13.7  -Continue to monitor for SIRS/Sepsis syndrome while inpatient    ==== Endocrine ====   -A1C: , no h/o DM  -TSH: , no h/o thyroid disease     ==== Hematologic ====   -H/H: 9.3/28.8  -CBC, chem in AM  -DVT ppx: HSQ 5000u q8h and SCDs    ==== Disposition Planning ====  Telemetry

## 2022-07-30 NOTE — PROGRESS NOTE ADULT - SUBJECTIVE AND OBJECTIVE BOX
Patient discussed on morning rounds with Dr. Bryant    Operation / Date: 7/25/22 CABG x4  7/28 IR pericardial drainage    SUBJECTIVE ASSESSMENT:  57y Male seen and examined at bedside.  Patient with no complaints.  Denies chest pain, shortness of breath.  Able to pull 1000cc on IS.          Vital Signs Last 24 Hrs  T(C): 37.1 (30 Jul 2022 10:50), Max: 37.1 (30 Jul 2022 10:50)  T(F): 98.7 (30 Jul 2022 10:50), Max: 98.7 (30 Jul 2022 10:50)  HR: 98 (30 Jul 2022 08:36) (91 - 100)  BP: 114/67 (30 Jul 2022 08:36) (113/62 - 130/69)  BP(mean): 84 (30 Jul 2022 08:36) (79 - 91)  RR: 18 (30 Jul 2022 08:36) (18 - 34)  SpO2: 93% (30 Jul 2022 08:36) (85% - 100%)    Parameters below as of 30 Jul 2022 08:36  Patient On (Oxygen Delivery Method): room air      I&O's Detail    29 Jul 2022 07:01  -  30 Jul 2022 07:00  --------------------------------------------------------  IN:    Oral Fluid: 230 mL  Total IN: 230 mL    OUT:    Bulb (mL): 65 mL    Bulb (mL): 140 mL    Chest Tube (mL): 36 mL    Voided (mL): 1950 mL  Total OUT: 2191 mL    Total NET: -1961 mL      30 Jul 2022 07:01  -  30 Jul 2022 11:50  --------------------------------------------------------  IN:  Total IN: 0 mL    OUT:    Bulb (mL): 50 mL    Bulb (mL): 10 mL    Voided (mL): 400 mL  Total OUT: 460 mL    Total NET: -460 mL          CHEST TUBE:  No.   ZEENAT DRAIN:  Yes. 1 Groin, 1 pericardial  EPICARDIAL WIRES: Yes.  TIE DOWNS: No.  MCNAMARA: No.    PHYSICAL EXAM:    GEN: NAD, looks comfortable  Psych: Mood appropriate  Neuro: A&Ox3.  No focal deficits.  Moving all extremities.   HEENT: No obvious abnormalities  CV: S1S2, regular, no murmurs appreciated.  No carotid bruits.  No JVD  Lungs: Clear B/L.  No wheezing, rales or rhonchi  ABD: Soft, non-tender, non-distended.  +Bowel sounds  EXT: Warm and well perfused.  No peripheral edema noted  Musculoskeletal: Moving all extremities with normal ROM, no joint swelling  PV: Pedal pulses palpable  Incision: Midsternal incision CDI, stable sternum, prevena in place, last changed 7/29.  Right groin incision dry with ASHU bulb in place    LABS:                        9.3    13.70 )-----------( 329      ( 30 Jul 2022 05:57 )             28.8       COUMADIN:  Yes/No. REASON: .    PT/INR - ( 30 Jul 2022 05:57 )   PT: 16.2 sec;   INR: 1.36          PTT - ( 30 Jul 2022 05:57 )  PTT:27.7 sec    07-30    134<L>  |  98  |  20  ----------------------------<  117<H>  4.3   |  28  |  1.10    Ca    8.6      30 Jul 2022 05:57  Phos  2.5     07-29  Mg     2.3     07-30    TPro  5.6<L>  /  Alb  2.9<L>  /  TBili  0.8  /  DBili  x   /  AST  38  /  ALT  25  /  AlkPhos  62  07-29          MEDICATIONS  (STANDING):  aspirin  chewable 81 milliGRAM(s) Oral daily  atorvastatin 40 milliGRAM(s) Oral at bedtime  BACItracin   Ointment 1 Application(s) Topical daily  bisacodyl 5 milliGRAM(s) Oral at bedtime  chlorhexidine 2% Cloths 1 Application(s) Topical daily  dextrose 5%. 1000 milliLiter(s) (100 mL/Hr) IV Continuous <Continuous>  dextrose 5%. 1000 milliLiter(s) (50 mL/Hr) IV Continuous <Continuous>  dextrose 50% Injectable 25 Gram(s) IV Push once  dextrose 50% Injectable 12.5 Gram(s) IV Push once  dextrose 50% Injectable 25 Gram(s) IV Push once  gabapentin 300 milliGRAM(s) Oral every 8 hours  glucagon  Injectable 1 milliGRAM(s) IntraMuscular once  heparin   Injectable 5000 Unit(s) SubCutaneous every 8 hours  insulin lispro (ADMELOG) corrective regimen sliding scale   SubCutaneous Before meals and at bedtime  metoprolol tartrate 12.5 milliGRAM(s) Oral once  metoprolol tartrate 37.5 milliGRAM(s) Oral every 6 hours  pantoprazole    Tablet 40 milliGRAM(s) Oral before breakfast  polyethylene glycol 3350 17 Gram(s) Oral daily  senna 2 Tablet(s) Oral at bedtime  sodium chloride 0.9% lock flush 3 milliLiter(s) IV Push every 8 hours  sodium chloride 0.9%. 1000 milliLiter(s) (10 mL/Hr) IV Continuous <Continuous>    MEDICATIONS  (PRN):  acetaminophen     Tablet .. 650 milliGRAM(s) Oral every 6 hours PRN Mild Pain (1 - 3)  dextrose Oral Gel 15 Gram(s) Oral once PRN Blood Glucose LESS THAN 70 milliGRAM(s)/deciliter  melatonin 5 milliGRAM(s) Oral at bedtime PRN Insomnia  oxyCODONE    IR 5 milliGRAM(s) Oral every 4 hours PRN Moderate Pain (4 - 6)  oxyCODONE    IR 10 milliGRAM(s) Oral every 6 hours PRN Severe Pain (7 - 10)        RADIOLOGY & ADDITIONAL TESTS:    < from: Xray Chest 1 View- PORTABLE-Routine (Xray Chest 1 View- PORTABLE-Routine in AM.) (07.30.22 @ 05:37) >  Stable heart size. Mid sternotomy wires. Pericardial drain in   place. Scattered opacities in the left lung. The right lung appears   clear. No pleural effusion.    < end of copied text >

## 2022-07-30 NOTE — PROGRESS NOTE ADULT - SUBJECTIVE AND OBJECTIVE BOX
Subjective: Patient seen and examined seated in bedside chair. No complaints. Denies right groin or lower extremity pain or tenderness. Reports increased swelling of the right calf and foot.     ROS:   Denies Headache, blurred vision, Chest Pain, SOB, Abdominal pain, nausea or vomiting     Social   aspirin  chewable 81  heparin   Injectable 5000  metoprolol tartrate 12.5  metoprolol tartrate 37.5      Allergies    No Known Allergies    Intolerances        Vital Signs Last 24 Hrs  T(C): 37.1 (30 Jul 2022 10:50), Max: 37.1 (30 Jul 2022 10:50)  T(F): 98.7 (30 Jul 2022 10:50), Max: 98.7 (30 Jul 2022 10:50)  HR: 98 (30 Jul 2022 08:36) (91 - 100)  BP: 114/67 (30 Jul 2022 08:36) (113/62 - 130/69)  BP(mean): 84 (30 Jul 2022 08:36) (79 - 91)  RR: 18 (30 Jul 2022 08:36) (18 - 34)  SpO2: 93% (30 Jul 2022 08:36) (85% - 100%)    Parameters below as of 30 Jul 2022 08:36  Patient On (Oxygen Delivery Method): room air      I&O's Summary    29 Jul 2022 07:01  -  30 Jul 2022 07:00  --------------------------------------------------------  IN: 230 mL / OUT: 2191 mL / NET: -1961 mL    30 Jul 2022 07:01  -  30 Jul 2022 12:08  --------------------------------------------------------  IN: 0 mL / OUT: 460 mL / NET: -460 mL      Gen: NAD, resting comfortably in bed  C/V: NSR, chest incision with provena vac in place  Pulm: Nonlabored breathing, no respiratory distress  Abd: soft, NT/ND  Extrem: Right calf and foot swelling. Nontender, soft compartments, no discoloration. R fem cutdown soft with resorbing ecchymosis, dressing C/D/I, ASHU x1 with minimal serosanguinous output  Vascular: palpable DP bilaterally   Neuro: Sensory and motor function intact        LABS:                        9.3    13.70 )-----------( 329      ( 30 Jul 2022 05:57 )             28.8     07-30    134<L>  |  98  |  20  ----------------------------<  117<H>  4.3   |  28  |  1.10    Ca    8.6      30 Jul 2022 05:57  Phos  2.5     07-29  Mg     2.3     07-30    TPro  5.6<L>  /  Alb  2.9<L>  /  TBili  0.8  /  DBili  x   /  AST  38  /  ALT  25  /  AlkPhos  62  07-29    PT/INR - ( 30 Jul 2022 05:57 )   PT: 16.2 sec;   INR: 1.36          PTT - ( 30 Jul 2022 05:57 )  PTT:27.7 sec            Assessment and Plan:   · Assessment	  56 y/o M w/ CAD s/p recent MI s/p CABG and R groin cutdown for pseudoaneurysm repair.      Plan:   Continue to get OOB as tolerated  Monitor R groin wound/AHSU drain for signs of hematoma, harvest drain removed 7/28  Increasing right leg swelling - recommend venous duplex of RLE   Elevate RLE while at rest    Rest of care per CTICU team  Vascular will continue to follow

## 2022-07-31 LAB
ANION GAP SERPL CALC-SCNC: 10 MMOL/L — SIGNIFICANT CHANGE UP (ref 5–17)
BUN SERPL-MCNC: 17 MG/DL — SIGNIFICANT CHANGE UP (ref 7–23)
CALCIUM SERPL-MCNC: 8.4 MG/DL — SIGNIFICANT CHANGE UP (ref 8.4–10.5)
CHLORIDE SERPL-SCNC: 97 MMOL/L — SIGNIFICANT CHANGE UP (ref 96–108)
CO2 SERPL-SCNC: 26 MMOL/L — SIGNIFICANT CHANGE UP (ref 22–31)
CREAT SERPL-MCNC: 1.11 MG/DL — SIGNIFICANT CHANGE UP (ref 0.5–1.3)
EGFR: 78 ML/MIN/1.73M2 — SIGNIFICANT CHANGE UP
GLUCOSE BLDC GLUCOMTR-MCNC: 104 MG/DL — HIGH (ref 70–99)
GLUCOSE BLDC GLUCOMTR-MCNC: 109 MG/DL — HIGH (ref 70–99)
GLUCOSE BLDC GLUCOMTR-MCNC: 131 MG/DL — HIGH (ref 70–99)
GLUCOSE BLDC GLUCOMTR-MCNC: 143 MG/DL — HIGH (ref 70–99)
GLUCOSE SERPL-MCNC: 113 MG/DL — HIGH (ref 70–99)
HCT VFR BLD CALC: 28.2 % — LOW (ref 39–50)
HGB BLD-MCNC: 9.2 G/DL — LOW (ref 13–17)
MAGNESIUM SERPL-MCNC: 2.1 MG/DL — SIGNIFICANT CHANGE UP (ref 1.6–2.6)
MCHC RBC-ENTMCNC: 29.4 PG — SIGNIFICANT CHANGE UP (ref 27–34)
MCHC RBC-ENTMCNC: 32.6 GM/DL — SIGNIFICANT CHANGE UP (ref 32–36)
MCV RBC AUTO: 90.1 FL — SIGNIFICANT CHANGE UP (ref 80–100)
NRBC # BLD: 0 /100 WBCS — SIGNIFICANT CHANGE UP (ref 0–0)
PHOSPHATE SERPL-MCNC: 3.2 MG/DL — SIGNIFICANT CHANGE UP (ref 2.5–4.5)
PLATELET # BLD AUTO: 371 K/UL — SIGNIFICANT CHANGE UP (ref 150–400)
POTASSIUM SERPL-MCNC: 3.9 MMOL/L — SIGNIFICANT CHANGE UP (ref 3.5–5.3)
POTASSIUM SERPL-SCNC: 3.9 MMOL/L — SIGNIFICANT CHANGE UP (ref 3.5–5.3)
RBC # BLD: 3.13 M/UL — LOW (ref 4.2–5.8)
RBC # FLD: 16.8 % — HIGH (ref 10.3–14.5)
SODIUM SERPL-SCNC: 133 MMOL/L — LOW (ref 135–145)
WBC # BLD: 16.54 K/UL — HIGH (ref 3.8–10.5)
WBC # FLD AUTO: 16.54 K/UL — HIGH (ref 3.8–10.5)

## 2022-07-31 PROCEDURE — 93308 TTE F-UP OR LMTD: CPT | Mod: 26

## 2022-07-31 PROCEDURE — 71045 X-RAY EXAM CHEST 1 VIEW: CPT | Mod: 26

## 2022-07-31 PROCEDURE — 93971 EXTREMITY STUDY: CPT | Mod: 26,RT

## 2022-07-31 RX ORDER — POTASSIUM CHLORIDE 20 MEQ
20 PACKET (EA) ORAL ONCE
Refills: 0 | Status: COMPLETED | OUTPATIENT
Start: 2022-07-31 | End: 2022-07-31

## 2022-07-31 RX ORDER — METOPROLOL TARTRATE 50 MG
12.5 TABLET ORAL ONCE
Refills: 0 | Status: COMPLETED | OUTPATIENT
Start: 2022-07-31 | End: 2022-07-31

## 2022-07-31 RX ORDER — FUROSEMIDE 40 MG
20 TABLET ORAL ONCE
Refills: 0 | Status: COMPLETED | OUTPATIENT
Start: 2022-07-31 | End: 2022-07-31

## 2022-07-31 RX ORDER — METOPROLOL TARTRATE 50 MG
50 TABLET ORAL EVERY 6 HOURS
Refills: 0 | Status: DISCONTINUED | OUTPATIENT
Start: 2022-07-31 | End: 2022-08-01

## 2022-07-31 RX ADMIN — Medication 37.5 MILLIGRAM(S): at 00:12

## 2022-07-31 RX ADMIN — Medication 12.5 MILLIGRAM(S): at 18:54

## 2022-07-31 RX ADMIN — ATORVASTATIN CALCIUM 40 MILLIGRAM(S): 80 TABLET, FILM COATED ORAL at 21:20

## 2022-07-31 RX ADMIN — Medication 1 APPLICATION(S): at 14:45

## 2022-07-31 RX ADMIN — Medication 20 MILLIGRAM(S): at 11:37

## 2022-07-31 RX ADMIN — OXYCODONE HYDROCHLORIDE 10 MILLIGRAM(S): 5 TABLET ORAL at 04:30

## 2022-07-31 RX ADMIN — GABAPENTIN 300 MILLIGRAM(S): 400 CAPSULE ORAL at 21:20

## 2022-07-31 RX ADMIN — OXYCODONE HYDROCHLORIDE 10 MILLIGRAM(S): 5 TABLET ORAL at 03:32

## 2022-07-31 RX ADMIN — Medication 650 MILLIGRAM(S): at 01:00

## 2022-07-31 RX ADMIN — Medication 37.5 MILLIGRAM(S): at 17:45

## 2022-07-31 RX ADMIN — Medication 37.5 MILLIGRAM(S): at 05:57

## 2022-07-31 RX ADMIN — GABAPENTIN 300 MILLIGRAM(S): 400 CAPSULE ORAL at 05:57

## 2022-07-31 RX ADMIN — HEPARIN SODIUM 5000 UNIT(S): 5000 INJECTION INTRAVENOUS; SUBCUTANEOUS at 05:56

## 2022-07-31 RX ADMIN — Medication 20 MILLIEQUIVALENT(S): at 11:40

## 2022-07-31 RX ADMIN — Medication 5 MILLIGRAM(S): at 05:57

## 2022-07-31 RX ADMIN — Medication 81 MILLIGRAM(S): at 11:37

## 2022-07-31 RX ADMIN — OXYCODONE HYDROCHLORIDE 10 MILLIGRAM(S): 5 TABLET ORAL at 21:20

## 2022-07-31 RX ADMIN — Medication 650 MILLIGRAM(S): at 23:06

## 2022-07-31 RX ADMIN — GABAPENTIN 300 MILLIGRAM(S): 400 CAPSULE ORAL at 14:45

## 2022-07-31 RX ADMIN — SODIUM CHLORIDE 3 MILLILITER(S): 9 INJECTION INTRAMUSCULAR; INTRAVENOUS; SUBCUTANEOUS at 14:46

## 2022-07-31 RX ADMIN — HEPARIN SODIUM 5000 UNIT(S): 5000 INJECTION INTRAVENOUS; SUBCUTANEOUS at 21:21

## 2022-07-31 RX ADMIN — Medication 650 MILLIGRAM(S): at 00:21

## 2022-07-31 RX ADMIN — Medication 37.5 MILLIGRAM(S): at 11:37

## 2022-07-31 RX ADMIN — Medication 20 MILLIEQUIVALENT(S): at 07:22

## 2022-07-31 RX ADMIN — HEPARIN SODIUM 5000 UNIT(S): 5000 INJECTION INTRAVENOUS; SUBCUTANEOUS at 14:26

## 2022-07-31 RX ADMIN — SODIUM CHLORIDE 3 MILLILITER(S): 9 INJECTION INTRAMUSCULAR; INTRAVENOUS; SUBCUTANEOUS at 05:42

## 2022-07-31 RX ADMIN — Medication 5 MILLIGRAM(S): at 23:06

## 2022-07-31 RX ADMIN — SENNA PLUS 2 TABLET(S): 8.6 TABLET ORAL at 21:20

## 2022-07-31 RX ADMIN — SODIUM CHLORIDE 3 MILLILITER(S): 9 INJECTION INTRAMUSCULAR; INTRAVENOUS; SUBCUTANEOUS at 21:21

## 2022-07-31 RX ADMIN — Medication 25 MILLIGRAM(S): at 09:34

## 2022-07-31 RX ADMIN — Medication 50 MILLIGRAM(S): at 23:04

## 2022-07-31 RX ADMIN — PANTOPRAZOLE SODIUM 40 MILLIGRAM(S): 20 TABLET, DELAYED RELEASE ORAL at 05:58

## 2022-07-31 NOTE — PROGRESS NOTE ADULT - SUBJECTIVE AND OBJECTIVE BOX
Subjective: PT states that he is doing better, family at bedside. PT states the swelling improved alot in the RLE.     ROS:   Denies Headache, blurred vision, Chest Pain, SOB, Abdominal pain, nausea or vomiting     Social   aspirin  chewable 81  heparin   Injectable 5000  metoprolol tartrate 37.5      Allergies    No Known Allergies    Intolerances        Vital Signs Last 24 Hrs  T(C): 36.6 (31 Jul 2022 09:46), Max: 37.7 (30 Jul 2022 17:46)  T(F): 97.8 (31 Jul 2022 09:46), Max: 99.8 (30 Jul 2022 17:46)  HR: 86 (31 Jul 2022 11:18) (86 - 94)  BP: 130/62 (31 Jul 2022 11:18) (110/62 - 147/67)  BP(mean): 86 (31 Jul 2022 11:18) (74 - 97)  RR: 12 (31 Jul 2022 11:18) (12 - 18)  SpO2: 96% (31 Jul 2022 11:18) (93% - 97%)    Parameters below as of 31 Jul 2022 11:18  Patient On (Oxygen Delivery Method): room air      I&O's Summary    30 Jul 2022 07:01  -  31 Jul 2022 07:00  --------------------------------------------------------  IN: 840 mL / OUT: 1445 mL / NET: -605 mL    31 Jul 2022 07:01  -  31 Jul 2022 12:27  --------------------------------------------------------  IN: 0 mL / OUT: 300 mL / NET: -300 mL        Physical Exam:  Gen: NAD, resting comfortably in bed  C/V: NSR, chest incision with provena vac in place  Pulm: Nonlabored breathing, no respiratory distress  Abd: soft, NT/ND  Extrem: Right calf and foot swelling. Nontender, soft compartments, no discoloration. R fem cutdown soft with resorbing ecchymosis, dressing C/D/I, ASHU x1 with minimal serosanguinous output  Vascular: palpable DP bilaterally   Neuro: Sensory and motor function intact      LABS:                        9.2    16.54 )-----------( 371      ( 31 Jul 2022 05:28 )             28.2     07-31    133<L>  |  97  |  17  ----------------------------<  113<H>  3.9   |  26  |  1.11    Ca    8.4      31 Jul 2022 05:28  Phos  3.2     07-31  Mg     2.1     07-31      PT/INR - ( 30 Jul 2022 05:57 )   PT: 16.2 sec;   INR: 1.36          PTT - ( 30 Jul 2022 05:57 )  PTT:27.7 sec    Radiology and Additional Studies:    · Assessment	  58 y/o M w/ CAD s/p recent MI s/p CABG and R groin cutdown for pseudoaneurysm repair.      Plan:   Continue to get OOB as tolerated  Monitor R groin wound/ASHU drain for signs of hematoma, harvest drain removed 7/28  Increasing right leg swelling - recommend venous duplex of RLE   Elevate RLE while at rest    Rest of care per CTICU team  Vascular will continue to follow

## 2022-07-31 NOTE — PROGRESS NOTE ADULT - SUBJECTIVE AND OBJECTIVE BOX
Patient discussed on morning rounds with Dr. Bryant    Operation / Date:  7/25/22 CABG x4  7/28 IR pericardial drainage    SUBJECTIVE ASSESSMENT:  57y Male seen and examined at bedside.  Patient with no complaints.  Denies chest pain, shortness of breath, nausea, vomiting.        Vital Signs Last 24 Hrs  T(C): 36.6 (31 Jul 2022 09:46), Max: 37.7 (30 Jul 2022 17:46)  T(F): 97.8 (31 Jul 2022 09:46), Max: 99.8 (30 Jul 2022 17:46)  HR: 86 (31 Jul 2022 09:16) (86 - 98)  BP: 113/59 (31 Jul 2022 09:16) (110/62 - 147/67)  BP(mean): 74 (31 Jul 2022 09:16) (74 - 97)  RR: 12 (31 Jul 2022 09:16) (12 - 18)  SpO2: 97% (31 Jul 2022 09:16) (93% - 97%)    Parameters below as of 31 Jul 2022 09:16  Patient On (Oxygen Delivery Method): room air      I&O's Detail    30 Jul 2022 07:01  -  31 Jul 2022 07:00  --------------------------------------------------------  IN:    Oral Fluid: 840 mL  Total IN: 840 mL    OUT:    Bulb (mL): 125 mL    Bulb (mL): 20 mL    Voided (mL): 1300 mL  Total OUT: 1445 mL    Total NET: -605 mL      31 Jul 2022 07:01  -  31 Jul 2022 11:06  --------------------------------------------------------  IN:  Total IN: 0 mL    OUT:    Bulb (mL): 0 mL    Bulb (mL): 0 mL    Voided (mL): 300 mL  Total OUT: 300 mL    Total NET: -300 mL          CHEST TUBE:  No.   ZEENAT DRAIN:  Yes.  EPICARDIAL WIRES: Yes.  TIE DOWNS: No.  MCNAMARA: No.    PHYSICAL EXAM:    GEN: NAD, looks comfortable  Psych: Mood appropriate  Neuro: A&Ox3.  No focal deficits.  Moving all extremities.   HEENT: No obvious abnormalities  CV: S1S2, regular, no murmurs appreciated.  No carotid bruits.  No JVD  Lungs: Clear B/L.  No wheezing, rales or rhonchi  ABD: Soft, non-tender, non-distended.  +Bowel sounds  EXT: Warm and well perfused. 1+ peripheral edema noted  Musculoskeletal: Moving all extremities with normal ROM, no joint swelling  PV: Pedal pulses palpable  Incision: Midsternal incision CDI, stable sternum, prevena in place, last changed 7/29.  Right groin incision dry with ASHU bulb in place      LABS:                        9.2    16.54 )-----------( 371      ( 31 Jul 2022 05:28 )             28.2       COUMADIN:  Yes/No. REASON: .    PT/INR - ( 30 Jul 2022 05:57 )   PT: 16.2 sec;   INR: 1.36          PTT - ( 30 Jul 2022 05:57 )  PTT:27.7 sec    07-31    133<L>  |  97  |  17  ----------------------------<  113<H>  3.9   |  26  |  1.11    Ca    8.4      31 Jul 2022 05:28  Phos  3.2     07-31  Mg     2.1     07-31            MEDICATIONS  (STANDING):  aspirin  chewable 81 milliGRAM(s) Oral daily  atorvastatin 40 milliGRAM(s) Oral at bedtime  BACItracin   Ointment 1 Application(s) Topical daily  bisacodyl 5 milliGRAM(s) Oral every 12 hours  chlorhexidine 2% Cloths 1 Application(s) Topical daily  dextrose 5%. 1000 milliLiter(s) (100 mL/Hr) IV Continuous <Continuous>  dextrose 5%. 1000 milliLiter(s) (50 mL/Hr) IV Continuous <Continuous>  dextrose 50% Injectable 25 Gram(s) IV Push once  dextrose 50% Injectable 12.5 Gram(s) IV Push once  dextrose 50% Injectable 25 Gram(s) IV Push once  gabapentin 300 milliGRAM(s) Oral every 8 hours  glucagon  Injectable 1 milliGRAM(s) IntraMuscular once  heparin   Injectable 5000 Unit(s) SubCutaneous every 8 hours  insulin lispro (ADMELOG) corrective regimen sliding scale   SubCutaneous Before meals and at bedtime  metoprolol tartrate 37.5 milliGRAM(s) Oral every 6 hours  pantoprazole    Tablet 40 milliGRAM(s) Oral before breakfast  polyethylene glycol 3350 17 Gram(s) Oral daily  senna 2 Tablet(s) Oral at bedtime  sodium chloride 0.9% lock flush 3 milliLiter(s) IV Push every 8 hours  sodium chloride 0.9%. 1000 milliLiter(s) (10 mL/Hr) IV Continuous <Continuous>    MEDICATIONS  (PRN):  acetaminophen     Tablet .. 650 milliGRAM(s) Oral every 6 hours PRN Mild Pain (1 - 3)  dextrose Oral Gel 15 Gram(s) Oral once PRN Blood Glucose LESS THAN 70 milliGRAM(s)/deciliter  diphenhydrAMINE 25 milliGRAM(s) Oral every 6 hours PRN Rash and/or Itching  melatonin 5 milliGRAM(s) Oral at bedtime PRN Insomnia  oxyCODONE    IR 10 milliGRAM(s) Oral every 6 hours PRN Severe Pain (7 - 10)  oxyCODONE    IR 5 milliGRAM(s) Oral every 4 hours PRN Moderate Pain (4 - 6)        RADIOLOGY & ADDITIONAL TESTS:

## 2022-07-31 NOTE — PROGRESS NOTE ADULT - ASSESSMENT
57 y.o M with no significant PMHx, recent COVID infection 3 weeks ago, who initially presented to [facility] ED on 7/19 with exertional chest pain X1 week, relieved with rest. In the ED patient noted to have elevated troponin and was ruled in for NSTEMI. He was given aspirin and brilinta and taken to the cath lab. Cath revealed 3VCAD: LAD 90%, D1 80%, LCx 90%, OM1 100% thrombotic occlusion with collaterals from RCA, RPDA 80%. IABP was placed and patient was brought to the ICU post cath. IABP was removed the next day and decision made to transfer to [facility] under the care of Dr. Bran for surgical evaluation.   On 7/25/22 patient underwent CABG x4, repair of Right pseudoanuerysm.  Arrived on Levo and sulaiman.  Postoperative course complicated by right groin hematoma.  POD 1 received 2 units PRBC, TTE with small to mod loculated pericardial effusion.  POD 2 received 1 unit PRBC, CT chest with moderate pericardial effusion, IR placed pericardial drain with 250cc output.  Incidentally found to be covid +.  POD 3 Prevena dressing replaced, pleural tube removed, BB titrated, transferred to [facility].  POD 4 titrating BB, pericardial drain remains.  POD 5 ECHO pending    ==== Neurovascular ====  -No delirium, pain well managed on current regimen  -C/w PRNs for Pain control  -Monitor neuro status    ==== Respiratory ====  -Saturates well on RA     -AM CXR stable, repeat in AM  -Encourage IS 10x/hour while awake, Cough and deep breathing exercises  -Monitor respiratory status via SpO2    ==== Cardiovascular ====  Monitor on Tele  Continue aspirin 81mg QD  Continue Statin  Continue Lopressor 37.5mg Q6hrs  ECHO today, possible pericardial drain removal    ==== GI ====   -Tolerating PO  -Prophylaxis: Protonix  -C/w bowel regimen    ==== /Renal ====  -BUN/Cr: 17/1.11  -Trend Cr on AM labs  -Replete electrolytes as needed    ==== ID ====   Afebrile, asymptomatic  -WBC: 16.54  -Continue to monitor for SIRS/Sepsis syndrome while inpatient    ==== Endocrine ====   -A1C: no h/o DM  -TSH: no h/o thyroid disease     ==== Hematologic ====   -H/H: 9.2/28.2  -CBC, chem in AM  -DVT ppx: HSQ 5000u q8h and SCDs    ==== Disposition Planning ====  Telemetry

## 2022-08-01 ENCOUNTER — TRANSCRIPTION ENCOUNTER (OUTPATIENT)
Age: 57
End: 2022-08-01

## 2022-08-01 VITALS — TEMPERATURE: 98 F

## 2022-08-01 LAB
ANION GAP SERPL CALC-SCNC: 12 MMOL/L — SIGNIFICANT CHANGE UP (ref 5–17)
APPEARANCE UR: CLEAR — SIGNIFICANT CHANGE UP
BILIRUB UR-MCNC: NEGATIVE — SIGNIFICANT CHANGE UP
BUN SERPL-MCNC: 21 MG/DL — SIGNIFICANT CHANGE UP (ref 7–23)
CALCIUM SERPL-MCNC: 8.9 MG/DL — SIGNIFICANT CHANGE UP (ref 8.4–10.5)
CHLORIDE SERPL-SCNC: 100 MMOL/L — SIGNIFICANT CHANGE UP (ref 96–108)
CO2 SERPL-SCNC: 25 MMOL/L — SIGNIFICANT CHANGE UP (ref 22–31)
COLOR SPEC: YELLOW — SIGNIFICANT CHANGE UP
CREAT SERPL-MCNC: 1.15 MG/DL — SIGNIFICANT CHANGE UP (ref 0.5–1.3)
DIFF PNL FLD: NEGATIVE — SIGNIFICANT CHANGE UP
EGFR: 75 ML/MIN/1.73M2 — SIGNIFICANT CHANGE UP
GLUCOSE BLDC GLUCOMTR-MCNC: 106 MG/DL — HIGH (ref 70–99)
GLUCOSE BLDC GLUCOMTR-MCNC: 116 MG/DL — HIGH (ref 70–99)
GLUCOSE SERPL-MCNC: 114 MG/DL — HIGH (ref 70–99)
GLUCOSE UR QL: NEGATIVE — SIGNIFICANT CHANGE UP
HCT VFR BLD CALC: 29.9 % — LOW (ref 39–50)
HGB BLD-MCNC: 9.7 G/DL — LOW (ref 13–17)
KETONES UR-MCNC: NEGATIVE — SIGNIFICANT CHANGE UP
LEUKOCYTE ESTERASE UR-ACNC: NEGATIVE — SIGNIFICANT CHANGE UP
MAGNESIUM SERPL-MCNC: 2.2 MG/DL — SIGNIFICANT CHANGE UP (ref 1.6–2.6)
MCHC RBC-ENTMCNC: 29.2 PG — SIGNIFICANT CHANGE UP (ref 27–34)
MCHC RBC-ENTMCNC: 32.4 GM/DL — SIGNIFICANT CHANGE UP (ref 32–36)
MCV RBC AUTO: 90.1 FL — SIGNIFICANT CHANGE UP (ref 80–100)
NITRITE UR-MCNC: NEGATIVE — SIGNIFICANT CHANGE UP
NRBC # BLD: 0 /100 WBCS — SIGNIFICANT CHANGE UP (ref 0–0)
PH UR: 5.5 — SIGNIFICANT CHANGE UP (ref 5–8)
PHOSPHATE SERPL-MCNC: 4.1 MG/DL — SIGNIFICANT CHANGE UP (ref 2.5–4.5)
PLATELET # BLD AUTO: 408 K/UL — HIGH (ref 150–400)
POTASSIUM SERPL-MCNC: 4.7 MMOL/L — SIGNIFICANT CHANGE UP (ref 3.5–5.3)
POTASSIUM SERPL-SCNC: 4.7 MMOL/L — SIGNIFICANT CHANGE UP (ref 3.5–5.3)
PROT UR-MCNC: NEGATIVE MG/DL — SIGNIFICANT CHANGE UP
RBC # BLD: 3.32 M/UL — LOW (ref 4.2–5.8)
RBC # FLD: 16.4 % — HIGH (ref 10.3–14.5)
SODIUM SERPL-SCNC: 137 MMOL/L — SIGNIFICANT CHANGE UP (ref 135–145)
SP GR SPEC: 1.02 — SIGNIFICANT CHANGE UP (ref 1–1.03)
UROBILINOGEN FLD QL: 0.2 E.U./DL — SIGNIFICANT CHANGE UP
WBC # BLD: 18.54 K/UL — HIGH (ref 3.8–10.5)
WBC # FLD AUTO: 18.54 K/UL — HIGH (ref 3.8–10.5)

## 2022-08-01 PROCEDURE — 93306 TTE W/DOPPLER COMPLETE: CPT

## 2022-08-01 PROCEDURE — 71045 X-RAY EXAM CHEST 1 VIEW: CPT

## 2022-08-01 PROCEDURE — 93926 LOWER EXTREMITY STUDY: CPT

## 2022-08-01 PROCEDURE — 82042 OTHER SOURCE ALBUMIN QUAN EA: CPT

## 2022-08-01 PROCEDURE — 84157 ASSAY OF PROTEIN OTHER: CPT

## 2022-08-01 PROCEDURE — 97116 GAIT TRAINING THERAPY: CPT

## 2022-08-01 PROCEDURE — 85610 PROTHROMBIN TIME: CPT

## 2022-08-01 PROCEDURE — 86850 RBC ANTIBODY SCREEN: CPT

## 2022-08-01 PROCEDURE — 86923 COMPATIBILITY TEST ELECTRIC: CPT

## 2022-08-01 PROCEDURE — 71250 CT THORAX DX C-: CPT

## 2022-08-01 PROCEDURE — 82962 GLUCOSE BLOOD TEST: CPT

## 2022-08-01 PROCEDURE — 85027 COMPLETE CBC AUTOMATED: CPT

## 2022-08-01 PROCEDURE — 84100 ASSAY OF PHOSPHORUS: CPT

## 2022-08-01 PROCEDURE — 83605 ASSAY OF LACTIC ACID: CPT

## 2022-08-01 PROCEDURE — 97161 PT EVAL LOW COMPLEX 20 MIN: CPT

## 2022-08-01 PROCEDURE — 84295 ASSAY OF SERUM SODIUM: CPT

## 2022-08-01 PROCEDURE — 85025 COMPLETE CBC W/AUTO DIFF WBC: CPT

## 2022-08-01 PROCEDURE — 85730 THROMBOPLASTIN TIME PARTIAL: CPT

## 2022-08-01 PROCEDURE — 93005 ELECTROCARDIOGRAM TRACING: CPT

## 2022-08-01 PROCEDURE — 86901 BLOOD TYPING SEROLOGIC RH(D): CPT

## 2022-08-01 PROCEDURE — 84484 ASSAY OF TROPONIN QUANT: CPT

## 2022-08-01 PROCEDURE — C1729: CPT

## 2022-08-01 PROCEDURE — 97530 THERAPEUTIC ACTIVITIES: CPT

## 2022-08-01 PROCEDURE — 83880 ASSAY OF NATRIURETIC PEPTIDE: CPT

## 2022-08-01 PROCEDURE — 82330 ASSAY OF CALCIUM: CPT

## 2022-08-01 PROCEDURE — 94002 VENT MGMT INPAT INIT DAY: CPT

## 2022-08-01 PROCEDURE — 93971 EXTREMITY STUDY: CPT

## 2022-08-01 PROCEDURE — 84132 ASSAY OF SERUM POTASSIUM: CPT

## 2022-08-01 PROCEDURE — 36430 TRANSFUSION BLD/BLD COMPNT: CPT

## 2022-08-01 PROCEDURE — 85347 COAGULATION TIME ACTIVATED: CPT

## 2022-08-01 PROCEDURE — 87205 SMEAR GRAM STAIN: CPT

## 2022-08-01 PROCEDURE — P9040: CPT

## 2022-08-01 PROCEDURE — C1751: CPT

## 2022-08-01 PROCEDURE — C1894: CPT

## 2022-08-01 PROCEDURE — P9016: CPT

## 2022-08-01 PROCEDURE — U0005: CPT

## 2022-08-01 PROCEDURE — 82945 GLUCOSE OTHER FLUID: CPT

## 2022-08-01 PROCEDURE — P9045: CPT

## 2022-08-01 PROCEDURE — 80053 COMPREHEN METABOLIC PANEL: CPT

## 2022-08-01 PROCEDURE — 87070 CULTURE OTHR SPECIMN AEROBIC: CPT

## 2022-08-01 PROCEDURE — C1781: CPT

## 2022-08-01 PROCEDURE — 85576 BLOOD PLATELET AGGREGATION: CPT

## 2022-08-01 PROCEDURE — 87635 SARS-COV-2 COVID-19 AMP PRB: CPT

## 2022-08-01 PROCEDURE — 83615 LACTATE (LD) (LDH) ENZYME: CPT

## 2022-08-01 PROCEDURE — C1889: CPT

## 2022-08-01 PROCEDURE — 33017 PRCRD DRG 6YR+ W/O CGEN CAR: CPT

## 2022-08-01 PROCEDURE — 93321 DOPPLER ECHO F-UP/LMTD STD: CPT

## 2022-08-01 PROCEDURE — 74176 CT ABD & PELVIS W/O CONTRAST: CPT

## 2022-08-01 PROCEDURE — 86900 BLOOD TYPING SEROLOGIC ABO: CPT

## 2022-08-01 PROCEDURE — 89051 BODY FLUID CELL COUNT: CPT

## 2022-08-01 PROCEDURE — 80048 BASIC METABOLIC PNL TOTAL CA: CPT

## 2022-08-01 PROCEDURE — 94150 VITAL CAPACITY TEST: CPT

## 2022-08-01 PROCEDURE — P9047: CPT

## 2022-08-01 PROCEDURE — 93880 EXTRACRANIAL BILAT STUDY: CPT

## 2022-08-01 PROCEDURE — 93308 TTE F-UP OR LMTD: CPT

## 2022-08-01 PROCEDURE — 81003 URINALYSIS AUTO W/O SCOPE: CPT

## 2022-08-01 PROCEDURE — 88304 TISSUE EXAM BY PATHOLOGIST: CPT

## 2022-08-01 PROCEDURE — C1769: CPT

## 2022-08-01 PROCEDURE — 80061 LIPID PANEL: CPT

## 2022-08-01 PROCEDURE — 83735 ASSAY OF MAGNESIUM: CPT

## 2022-08-01 PROCEDURE — 82803 BLOOD GASES ANY COMBINATION: CPT

## 2022-08-01 PROCEDURE — U0003: CPT

## 2022-08-01 PROCEDURE — 36415 COLL VENOUS BLD VENIPUNCTURE: CPT

## 2022-08-01 PROCEDURE — 71045 X-RAY EXAM CHEST 1 VIEW: CPT | Mod: 26

## 2022-08-01 PROCEDURE — 93308 TTE F-UP OR LMTD: CPT | Mod: 26

## 2022-08-01 PROCEDURE — 93306 TTE W/DOPPLER COMPLETE: CPT | Mod: 26

## 2022-08-01 PROCEDURE — 83036 HEMOGLOBIN GLYCOSYLATED A1C: CPT

## 2022-08-01 PROCEDURE — 86891 AUTOLOGOUS BLOOD OP SALVAGE: CPT

## 2022-08-01 PROCEDURE — 84443 ASSAY THYROID STIM HORMONE: CPT

## 2022-08-01 PROCEDURE — 87075 CULTR BACTERIA EXCEPT BLOOD: CPT

## 2022-08-01 PROCEDURE — 82533 TOTAL CORTISOL: CPT

## 2022-08-01 RX ORDER — PANTOPRAZOLE SODIUM 20 MG/1
1 TABLET, DELAYED RELEASE ORAL
Qty: 30 | Refills: 0
Start: 2022-08-01 | End: 2022-08-30

## 2022-08-01 RX ORDER — POLYETHYLENE GLYCOL 3350 17 G/17G
17 POWDER, FOR SOLUTION ORAL
Qty: 119 | Refills: 0
Start: 2022-08-01 | End: 2022-08-07

## 2022-08-01 RX ORDER — FUROSEMIDE 40 MG
1 TABLET ORAL
Qty: 14 | Refills: 0
Start: 2022-08-01 | End: 2022-08-14

## 2022-08-01 RX ORDER — ATORVASTATIN CALCIUM 80 MG/1
1 TABLET, FILM COATED ORAL
Qty: 30 | Refills: 0
Start: 2022-08-01 | End: 2022-08-30

## 2022-08-01 RX ORDER — METOPROLOL TARTRATE 50 MG
1 TABLET ORAL
Qty: 60 | Refills: 0
Start: 2022-08-01 | End: 2022-08-30

## 2022-08-01 RX ORDER — POTASSIUM CHLORIDE 20 MEQ
1 PACKET (EA) ORAL
Qty: 14 | Refills: 0
Start: 2022-08-01 | End: 2022-08-14

## 2022-08-01 RX ORDER — OXYCODONE HYDROCHLORIDE 5 MG/1
1 TABLET ORAL
Qty: 20 | Refills: 0
Start: 2022-08-01 | End: 2022-08-05

## 2022-08-01 RX ORDER — ACETAMINOPHEN 500 MG
2 TABLET ORAL
Qty: 0 | Refills: 0 | DISCHARGE
Start: 2022-08-01

## 2022-08-01 RX ORDER — ASPIRIN/CALCIUM CARB/MAGNESIUM 324 MG
1 TABLET ORAL
Qty: 30 | Refills: 0
Start: 2022-08-01 | End: 2022-08-30

## 2022-08-01 RX ADMIN — HEPARIN SODIUM 5000 UNIT(S): 5000 INJECTION INTRAVENOUS; SUBCUTANEOUS at 05:24

## 2022-08-01 RX ADMIN — Medication 50 MILLIGRAM(S): at 05:24

## 2022-08-01 RX ADMIN — Medication 5 MILLIGRAM(S): at 05:24

## 2022-08-01 RX ADMIN — PANTOPRAZOLE SODIUM 40 MILLIGRAM(S): 20 TABLET, DELAYED RELEASE ORAL at 06:01

## 2022-08-01 RX ADMIN — Medication 81 MILLIGRAM(S): at 12:31

## 2022-08-01 RX ADMIN — SODIUM CHLORIDE 3 MILLILITER(S): 9 INJECTION INTRAMUSCULAR; INTRAVENOUS; SUBCUTANEOUS at 05:21

## 2022-08-01 RX ADMIN — HEPARIN SODIUM 5000 UNIT(S): 5000 INJECTION INTRAVENOUS; SUBCUTANEOUS at 13:43

## 2022-08-01 RX ADMIN — CHLORHEXIDINE GLUCONATE 1 APPLICATION(S): 213 SOLUTION TOPICAL at 05:25

## 2022-08-01 RX ADMIN — GABAPENTIN 300 MILLIGRAM(S): 400 CAPSULE ORAL at 12:34

## 2022-08-01 RX ADMIN — Medication 1 APPLICATION(S): at 12:31

## 2022-08-01 RX ADMIN — Medication 650 MILLIGRAM(S): at 00:05

## 2022-08-01 RX ADMIN — SODIUM CHLORIDE 3 MILLILITER(S): 9 INJECTION INTRAMUSCULAR; INTRAVENOUS; SUBCUTANEOUS at 12:34

## 2022-08-01 RX ADMIN — GABAPENTIN 300 MILLIGRAM(S): 400 CAPSULE ORAL at 05:24

## 2022-08-01 RX ADMIN — Medication 50 MILLIGRAM(S): at 12:31

## 2022-08-01 NOTE — DISCHARGE NOTE PROVIDER - NSDCCPCAREPLAN_GEN_ALL_CORE_FT
PRINCIPAL DISCHARGE DIAGNOSIS  Diagnosis: CAD (coronary artery disease)  Assessment and Plan of Treatment:       SECONDARY DISCHARGE DIAGNOSES  Diagnosis: Pseudoaneurysm of right femoral artery  Assessment and Plan of Treatment:

## 2022-08-01 NOTE — DISCHARGE NOTE PROVIDER - NSDCFUADDINST_GEN_ALL_CORE_FT
-You may take Tylenol over-the-counter as indicated on bottle as needed for mild to moderate pain. You may take Oxycodone 5mg as prescribed as needed for severe pain only.     -You may take over-the-counter miralax as indicated on bottle as needed for constipation while taking narcotic pain medication.    -Walk daily as tolerated and use your incentive spirometer 10 times every hour while you are awake.     -Please weigh yourself daily. If you notice over a 3 pound weight gain in 3 days, this is a sign you are likely retaining too much fluid. It is imperative you call our right away with unexplained rapid weight gain.      -Please continue to wear the compression stockings given to you in the hospital at home. This is a way to prevent fluid from building up in your legs.     -No driving or strenuous activity/exercise until cleared by your surgeon.    -Gently clean your incisions with unscented/antibacterial soap and water, pat dry, DO NOT SCRUB.  You may leave them open to air.    -Call your doctor if you have shortness of breath, chest pain not relieved by pain medication, dizziness, fever >100.5, or increased redness or drainage from incisions.   -You may take Tylenol over-the-counter as indicated on bottle as needed for mild to moderate pain. You may take Oxycodone 5mg as prescribed as needed for severe pain only.     -You may take over-the-counter miralax as indicated on bottle as needed for constipation while taking narcotic pain medication.    -Staples to R groin will be removed at follow-up appointment.     -Walk daily as tolerated and use your incentive spirometer 10 times every hour while you are awake.     -Please weigh yourself daily. If you notice over a 3 pound weight gain in 3 days, this is a sign you are likely retaining too much fluid. It is imperative you call our right away with unexplained rapid weight gain.      -Please continue to wear the compression stockings given to you in the hospital at home. This is a way to prevent fluid from building up in your legs.     -No driving or strenuous activity/exercise until cleared by your surgeon.    -Gently clean your incisions with unscented/antibacterial soap and water, pat dry, DO NOT SCRUB.  You may leave them open to air.    -Call your doctor if you have shortness of breath, chest pain not relieved by pain medication, dizziness, fever >100.5, or increased redness or drainage from incisions.   -You may take Tylenol over-the-counter as indicated on bottle as needed for mild to moderate pain. You may take Oxycodone 5mg as prescribed as needed for severe pain only.     -You may take over-the-counter miralax as indicated on bottle as needed for constipation while taking narcotic pain medication.    -Staples to Right groin will be removed at follow-up appointment. Drain to R upper thigh to remain in place until follow-up vascular appointment. Please record outputs from drain daily.    -Walk daily as tolerated and use your incentive spirometer 10 times every hour while you are awake.     -Please weigh yourself daily. If you notice over a 3 pound weight gain in 3 days, this is a sign you are likely retaining too much fluid. It is imperative you call our right away with unexplained rapid weight gain.      -Please continue to wear the compression stockings given to you in the hospital at home. This is a way to prevent fluid from building up in your legs.     -No driving or strenuous activity/exercise until cleared by your surgeon.    -Gently clean your incisions with unscented/antibacterial soap and water, pat dry, DO NOT SCRUB.  You may leave them open to air.    -Call your doctor if you have shortness of breath, chest pain not relieved by pain medication, dizziness, fever >100.5, or increased redness or drainage from incisions.

## 2022-08-01 NOTE — DISCHARGE NOTE PROVIDER - NSDCCPTREATMENT_GEN_ALL_CORE_FT
PRINCIPAL PROCEDURE  Procedure: CABG, with NICK  Findings and Treatment: LIMA to LAD, SVG to PDA, SVG to OM, SVG to Diagonal      SECONDARY PROCEDURE  Procedure: Repair of femoral pseudoaneurysm  Findings and Treatment:

## 2022-08-01 NOTE — DISCHARGE NOTE PROVIDER - PROVIDER TOKENS
PROVIDER:[TOKEN:[[id number]],SCHEDULEDAPPT:[08/15/2022],SCHEDULEDAPPTTIME:[02:30 PM]],PROVIDER:[TOKEN:[[id number]],SCHEDULEDAPPT:[08/15/2022],SCHEDULEDAPPTTIME:[01:30 PM]],PROVIDER:[TOKEN:[[id number]],SCHEDULEDAPPT:[08/16/2022],SCHEDULEDAPPTTIME:[10:00 AM]]

## 2022-08-01 NOTE — DISCHARGE NOTE PROVIDER - NSDCFUADDAPPT_GEN_ALL_CORE_FT
Please follow-up with Dr. Benavides in his office on 8/8/2022 for R upper leg drain removal. Call office to make appointment.

## 2022-08-01 NOTE — DISCHARGE NOTE PROVIDER - NSDCMRMEDTOKEN_GEN_ALL_CORE_FT
aspirin 81 mg oral tablet, chewable: 1 tab(s) orally once a day  Lasix 40 mg oral tablet: 1 tab(s) orally once a day   Lipitor 40 mg oral tablet: 1 tab(s) orally once a day (at bedtime)  Lopressor 100 mg oral tablet: 1 tab(s) orally 2 times a day  oxyCODONE 5 mg oral tablet: 1 tab(s) orally every 6 hours, As Needed -for severe pain MDD:4  polyethylene glycol 3350 oral powder for reconstitution: 17 gram(s) orally once a day. Hold for loose stool  potassium chloride 20 mEq oral tablet, extended release: 1 tab(s) orally once a day. Please only take with Lasix  Protonix 40 mg oral delayed release tablet: 1 tab(s) orally once a day (before a meal)  Tylenol 325 mg oral tablet: 2 tab(s) orally every 6 hours, As needed, Mild Pain (1 - 3)

## 2022-08-01 NOTE — PROGRESS NOTE ADULT - ASSESSMENT
This is a 57yoM with newly diagnosed CAD presenting with NSTEMI S/P CABG (LIMA to LAD, SVG to PDA, SVG to OM, SVG to Diagonal) with right common femoral artery (CFA) pseudoaneurysm S/P cutdown with primary repair on 7/25/22.     - Continue ***  - Vascular Surgery (Team 3C) following This is a 57yoM with newly diagnosed CAD presenting with NSTEMI S/P CABG (LIMA to LAD, SVG to PDA, SVG to OM, SVG to Diagonal) with right common femoral artery (CFA) pseudoaneurysm S/P cutdown with primary repair on 7/25/22.     - Continue drain after discharge. Please have him record outputs every day and write them down  - F/U next Monday with Dr. Benavides  - Vascular Surgery (Team 3C) aware of discharge today.

## 2022-08-01 NOTE — DISCHARGE NOTE PROVIDER - CARE PROVIDERS DIRECT ADDRESSES
,[email or web address]

## 2022-08-01 NOTE — DISCHARGE NOTE NURSING/CASE MANAGEMENT/SOCIAL WORK - PATIENT PORTAL LINK FT
You can access the FollowMyHealth Patient Portal offered by [facility] by registering at the following website: [email or web address]. By joining [facility]’s FollowMyHealth portal, you will also be able to view your health information using other applications (apps) compatible with our system.

## 2022-08-01 NOTE — DISCHARGE NOTE PROVIDER - HOSPITAL COURSE
Over 35 minutes was spent with the patient reviewing the discharge material including medications, follow up appointments, recovery, concerning symptoms, and how to contact their health care providers if they have questions   58 YO Male w/o no significant PMHx, recent COVID infection 3 weeks ago, who initially presented to [facility] ED on 7/19 c/o exertional chest pain X1 week, relieved with rest. In the ED patient noted to have elevated troponin and was ruled in for NSTEMI. He was given aspirin and brilinta and taken to the cath lab. Cath revealed 3VCAD: LAD 90%, D1 80%, LCx 90%, OM1 100% thrombotic occlusion with collaterals from RCA, RPDA 80%. IABP was placed and patient was brought to the ICU post cath. IABP was removed the next day and decision made to transfer to [facility] under the care of Dr. Bran for surgical evaluation. On 7/25/22 patient underwent CABG x4 (LIMA to LAD, SVG to OM, SVG to Diag) and repair of Right pseudoanuerysm. Patient recovered in CTICU on Levo and sulaiman. Postoperative course complicated by right groin hematoma.  POD 1 received 2 units PRBC for low H&H, TTE significant for small to mod loculated pericardial effusion w/o evidence of tamponade. POD 2 received 1 unit PRBC for low H&H. TTE repeated and again demonstrated moderate pericardiac effusion without tamponade. Pressors weaned off and beta blockers started. On POD3 CT chest revealed moderate hemopericardium and IR placed pericardial drain with 250cc output. Incidentally found to be COVID positive pre-operatively. Prevena dressing replaced at incision site. POD 4 beta blockers titrated and L chest tube removed. CXR negative for pneumothorax. POD 5 patient transferred to [facility]. POD 6 Echo was negative for pericardial effusion and pericardial drain was removed. RLE venous duplex performed for RLE swelling was negative for DVT. On POD7 patient had a repeat echo which was _____. Patient taken off COVID isolation. Patient cleared by Dr. Bran for discharge home.     Over 35 minutes was spent with the patient reviewing the discharge material including medications, follow up appointments, recovery, concerning symptoms, and how to contact their health care providers if they have questions   58 YO Male w/o no significant PMHx, recent COVID infection 3 weeks ago, who initially presented to [facility] ED on 7/19 c/o exertional chest pain X1 week, relieved with rest. In the ED patient noted to have elevated troponin and was ruled in for NSTEMI. He was given aspirin and brilinta and taken to the cath lab. Cath revealed 3VCAD: LAD 90%, D1 80%, LCx 90%, OM1 100% thrombotic occlusion with collaterals from RCA, RPDA 80%. IABP was placed and patient was brought to the ICU post cath. IABP was removed the next day and decision made to transfer to [facility] under the care of Dr. Bran for surgical evaluation. On 7/25/22 patient underwent CABG x4 (LIMA to LAD, SVG to OM, SVG to Diag) and repair of Right pseudoanuerysm. Patient recovered in CTICU on Levo and sulaiman. Postoperative course complicated by right groin hematoma.  POD 1 received 2 units PRBC for low H&H, TTE significant for small to mod loculated pericardial effusion w/o evidence of tamponade. POD 2 received 1 unit PRBC for low H&H. TTE repeated and again demonstrated moderate pericardiac effusion without tamponade. Pressors weaned off and beta blockers started. On POD3 CT chest revealed moderate hemopericardium and IR placed pericardial drain with 250cc output. Incidentally found to be COVID positive pre-operatively. Prevena dressing replaced at incision site. POD 4 beta blockers titrated and L chest tube removed. CXR negative for pneumothorax. POD 5 patient transferred to [facility]. POD 6 Echo was negative for pericardial effusion and pericardial drain was removed. RLE venous duplex performed for RLE swelling was negative for DVT. On POD7 patient had a repeat echo which was _____. Patient taken off COVID isolation, ASHU to R upper thigh removed. Patient cleared by Dr. Bran for discharge home.     Over 35 minutes was spent with the patient reviewing the discharge material including medications, follow up appointments, recovery, concerning symptoms, and how to contact their health care providers if they have questions   58 YO Male w/o no significant PMHx, recent COVID infection 3 weeks ago, who initially presented to [facility] ED on 7/19 c/o exertional chest pain X1 week, relieved with rest. In the ED patient noted to have elevated troponin and was ruled in for NSTEMI. He was given aspirin and brilinta and taken to the cath lab. Cath revealed 3VCAD: LAD 90%, D1 80%, LCx 90%, OM1 100% thrombotic occlusion with collaterals from RCA, RPDA 80%. IABP was placed and patient was brought to the ICU post cath. IABP was removed the next day and decision made to transfer to [facility] under the care of Dr. Bran for surgical evaluation. On 7/25/22 patient underwent CABG x4 (LIMA to LAD, SVG to OM, SVG to Diag) and repair of Right pseudoanuerysm. Patient recovered in CTICU on Levo and sulaiman. Postoperative course complicated by right groin hematoma.  POD 1 received 2 units PRBC for low H&H, TTE significant for small to mod loculated pericardial effusion w/o evidence of tamponade. POD 2 received 1 unit PRBC for low H&H. TTE repeated and again demonstrated moderate pericardiac effusion without tamponade. Pressors weaned off and beta blockers started. On POD3 CT chest revealed moderate hemopericardium and IR placed pericardial drain with 250cc output. Incidentally found to be COVID positive pre-operatively. Prevena dressing replaced at incision site. POD 4 beta blockers titrated and L chest tube removed. CXR negative for pneumothorax. POD 5 patient transferred to [facility]. POD 6 Echo was negative for pericardial effusion and pericardial drain was removed. RLE venous duplex performed for RLE swelling was negative for DVT. On POD7 patient had a repeat echo which was _____. Patient taken off COVID isolation, ASHU to R upper thigh ___. Patient cleared by Dr. Bran for discharge home.     Over 35 minutes was spent with the patient reviewing the discharge material including medications, follow up appointments, recovery, concerning symptoms, and how to contact their health care providers if they have questions   58 YO Male w/o no significant PMHx, recent COVID infection 3 weeks ago, who initially presented to [facility] ED on 7/19 c/o exertional chest pain X1 week, relieved with rest. In the ED patient noted to have elevated troponin and was ruled in for NSTEMI. He was given aspirin and brilinta and taken to the cath lab. Cath revealed 3VCAD: LAD 90%, D1 80%, LCx 90%, OM1 100% thrombotic occlusion with collaterals from RCA, RPDA 80%. IABP was placed and patient was brought to the ICU post cath. IABP was removed the next day and decision made to transfer to [facility] under the care of Dr. Bran for surgical evaluation. On 7/25/22 patient underwent CABG x4 (LIMA to LAD, SVG to OM, SVG to Diag) and repair of Right pseudoanuerysm. Patient recovered in CTICU on Levo and sulaiman. Postoperative course complicated by right groin hematoma.  POD 1 received 2 units PRBC for low H&H, TTE significant for small to mod loculated pericardial effusion w/o evidence of tamponade. POD 2 received 1 unit PRBC for low H&H. TTE repeated and again demonstrated moderate pericardiac effusion without tamponade. Pressors weaned off and beta blockers started. On POD3 CT chest revealed moderate hemopericardium and IR placed pericardial drain with 250cc output. Incidentally found to be COVID positive pre-operatively. Prevena dressing replaced at incision site. POD 4 beta blockers titrated and L chest tube removed. CXR negative for pneumothorax. POD 5 patient transferred to [facility]. POD 6 Echo was negative for pericardial effusion and pericardial drain was removed. RLE venous duplex performed for RLE swelling was negative for DVT. On POD7 patient had a repeat echo which was negative for pericardial effusion. Patient taken off COVID isolation, ASHU to R upper thigh and R groin staples remain in place. Patient cleared by Dr. Bran for discharge home.     Over 35 minutes was spent with the patient reviewing the discharge material including medications, follow up appointments, recovery, concerning symptoms, and how to contact their health care providers if they have questions

## 2022-08-01 NOTE — PROGRESS NOTE ADULT - PROVIDER SPECIALTY LIST ADULT
CT Surgery
Vascular Surgery
Vascular Surgery
CT Surgery
Critical Care
Vascular Surgery
CT Surgery
CT Surgery
Critical Care
Intervent Radiology
Vascular Surgery
Vascular Surgery

## 2022-08-01 NOTE — PROGRESS NOTE ADULT - SUBJECTIVE AND OBJECTIVE BOX
Operation / Date:  22 CABG x4   IR pericardial drainage    SUBJECTIVE ASSESSMENT: Patient seen and examined at bedside. Patient admits to some occasional sternotomy incisional pain. Patient had a small BM this AM, passing flatus. Denies chest pain, SOB, palpitations, N/V/D.     Vital Signs Last 24 Hrs  T(C): 36.3 (01 Aug 2022 09:34), Max: 36.8 (2022 13:25)  T(F): 97.3 (01 Aug 2022 09:34), Max: 98.2 (2022 13:25)  HR: 86 (01 Aug 2022 09:17) (74 - 92)  BP: 115/67 (01 Aug 2022 09:17) (100/59 - 131/65)  BP(mean): 82 (01 Aug 2022 09:17) (75 - 90)  RR: 18 (01 Aug 2022 09:17) (12 - 18)  SpO2: 98% (01 Aug 2022 09:17) (97% - 98%)    Parameters below as of 01 Aug 2022 09:17  Patient On (Oxygen Delivery Method): room air    I&O's Detail    2022 07:01  -  01 Aug 2022 07:00  --------------------------------------------------------  IN:  Total IN: 0 mL    OUT:    Bulb (mL): 0 mL    Bulb (mL): 50 mL    Voided (mL): 2075 mL  Total OUT: 2125 mL    Total NET: -2125 mL    CHEST TUBE: None  ZEENAT DRAIN: None  EPICARDIAL WIRES: In place  TIE DOWNS: None   MCNAMARA: None    PHYSICAL EXAM:  GENERAL: NAD, sitting in chair comfortably  HEAD:  Atraumatic, Normocephalic  EYES: EOMI, PERRLA, conjunctiva and sclera clear  ENT: Moist mucous membranes  CHEST/LUNG: Clear to auscultation bilaterally; No rales, rhonchi, wheezing, or rubs. Unlabored respirations. Sternotomy well-approximated and healing well.   HEART: Regular rate and rhythm; No murmurs, rubs, or gallops  ABDOMEN: Bowel sounds present; Soft, Nontender, Nondistended. No hepatomegally  EXTREMITIES:  2+ Peripheral Pulses, brisk capillary refill. No clubbing, cyanosis, or edema  NERVOUS SYSTEM:  Alert & Oriented X3, speech clear. No deficits     LABS:                        9.7    18.54 )-----------( 408      ( 01 Aug 2022 05:30 )             29.9     08-    137  |  100  |  21  ----------------------------<  114<H>  4.7   |  25  |  1.15    Ca    8.9      01 Aug 2022 05:30  Phos  4.1     08-  Mg     2.2     08-    Urinalysis Basic - ( 01 Aug 2022 08:11 )    Color: Yellow / Appearance: Clear / S.025 / pH: x  Gluc: x / Ketone: NEGATIVE  / Bili: Negative / Urobili: 0.2 E.U./dL   Blood: x / Protein: NEGATIVE mg/dL / Nitrite: NEGATIVE   Leuk Esterase: NEGATIVE / RBC: x / WBC x   Sq Epi: x / Non Sq Epi: x / Bacteria: x    MEDICATIONS  (STANDING):  aspirin  chewable 81 milliGRAM(s) Oral daily  atorvastatin 40 milliGRAM(s) Oral at bedtime  BACItracin   Ointment 1 Application(s) Topical daily  bisacodyl 5 milliGRAM(s) Oral every 12 hours  chlorhexidine 2% Cloths 1 Application(s) Topical daily  dextrose 5%. 1000 milliLiter(s) (50 mL/Hr) IV Continuous <Continuous>  dextrose 5%. 1000 milliLiter(s) (100 mL/Hr) IV Continuous <Continuous>  dextrose 50% Injectable 25 Gram(s) IV Push once  dextrose 50% Injectable 12.5 Gram(s) IV Push once  dextrose 50% Injectable 25 Gram(s) IV Push once  gabapentin 300 milliGRAM(s) Oral every 8 hours  glucagon  Injectable 1 milliGRAM(s) IntraMuscular once  heparin   Injectable 5000 Unit(s) SubCutaneous every 8 hours  insulin lispro (ADMELOG) corrective regimen sliding scale   SubCutaneous Before meals and at bedtime  metoprolol tartrate 50 milliGRAM(s) Oral every 6 hours  pantoprazole    Tablet 40 milliGRAM(s) Oral before breakfast  polyethylene glycol 3350 17 Gram(s) Oral daily  senna 2 Tablet(s) Oral at bedtime  sodium chloride 0.9% lock flush 3 milliLiter(s) IV Push every 8 hours  sodium chloride 0.9%. 1000 milliLiter(s) (10 mL/Hr) IV Continuous <Continuous>    MEDICATIONS  (PRN):  acetaminophen     Tablet .. 650 milliGRAM(s) Oral every 6 hours PRN Mild Pain (1 - 3)  dextrose Oral Gel 15 Gram(s) Oral once PRN Blood Glucose LESS THAN 70 milliGRAM(s)/deciliter  diphenhydrAMINE 25 milliGRAM(s) Oral every 6 hours PRN Rash and/or Itching  melatonin 5 milliGRAM(s) Oral at bedtime PRN Insomnia  oxyCODONE    IR 5 milliGRAM(s) Oral every 4 hours PRN Moderate Pain (4 - 6)  oxyCODONE    IR 10 milliGRAM(s) Oral every 6 hours PRN Severe Pain (7 - 10)    RADIOLOGY & ADDITIONAL TESTS:  < from: US Duplex Venous Lower Ext Ltd, Right (22 @ 19:22) >  FINDINGS:    Evaluation of the right proximal femoral and deep femoral veins is   limited due to overlying staples. These veins however appear patent on   Doppler imaging.  There is normal compressibility of the right common femoral, mid and   distal femoral and popliteal veins.  The contralateral common femoral vein is patent.  Doppler examination shows normal spontaneous and phasic flow.    No calf vein thrombosis is detected.    IMPRESSION:  Within the limitations of the examination, no right lower extremity deep   venous thrombosis is visualized.    ECHO pending     Operation / Date:  22 CABG x4    Surgeon: Dr. Bran    SUBJECTIVE ASSESSMENT AND HOSPITAL COURSE:  56 YO Male w/o no significant PMHx, recent COVID infection 3 weeks ago, who initially presented to [facility] ED on  c/o exertional chest pain X1 week, relieved with rest. In the ED patient noted to have elevated troponin and was ruled in for NSTEMI. He was given aspirin and brilinta and taken to the cath lab. Cath revealed 3VCAD: LAD 90%, D1 80%, LCx 90%, OM1 100% thrombotic occlusion with collaterals from RCA, RPDA 80%. IABP was placed and patient was brought to the ICU post cath. IABP was removed the next day and decision made to transfer to [facility] under the care of Dr. Bran for surgical evaluation. On 22 patient underwent CABG x4 (LIMA to LAD, SVG to OM, SVG to Diag) and repair of Right pseudoanuerysm. Patient recovered in CTICU on Levo and sulaiman. Postoperative course complicated by right groin hematoma.  POD 1 received 2 units PRBC for low H&H, TTE significant for small to mod loculated pericardial effusion w/o evidence of tamponade. POD 2 received 1 unit PRBC for low H&H. TTE repeated and again demonstrated moderate pericardiac effusion without tamponade. Pressors weaned off and beta blockers started. On POD3 CT chest revealed moderate hemopericardium and IR placed pericardial drain with 250cc output. Incidentally found to be COVID positive pre-operatively. Prevena dressing replaced at incision site. POD 4 beta blockers titrated and L chest tube removed. CXR negative for pneumothorax. POD 5 patient transferred to [facility]. POD 6 Echo was negative for pericardial effusion and pericardial drain was removed. RLE venous duplex performed for RLE swelling was negative for DVT. On POD7 patient had a repeat echo which was _____. Patient taken off COVID isolation, ASHU to R upper thigh removed. Patient cleared by Dr. Bran for discharge home.     Vital Signs Last 24 Hrs  T(C): 36.3 (01 Aug 2022 09:34), Max: 36.8 (2022 21:24)  T(F): 97.3 (01 Aug 2022 09:34), Max: 98.2 (2022 21:24)  HR: 86 (01 Aug 2022 09:17) (74 - 92)  BP: 115/67 (01 Aug 2022 09:17) (100/59 - 131/65)  BP(mean): 82 (01 Aug 2022 09:17) (75 - 90)  RR: 18 (01 Aug 2022 09:17) (12 - 18)  SpO2: 98% (01 Aug 2022 09:17) (97% - 98%)    Parameters below as of 01 Aug 2022 09:17  Patient On (Oxygen Delivery Method): room air    EPICARDIAL WIRES REMOVED: YES  TIE DOWNS REMOVED: N/A    PHYSICAL EXAM:   GENERAL: NAD, sitting in chair comfortably  HEAD:  Atraumatic, Normocephalic  EYES: EOMI, PERRLA, conjunctiva and sclera clear  ENT: Moist mucous membranes  CHEST/LUNG: Clear to auscultation bilaterally; No rales, rhonchi, wheezing, or rubs. Unlabored respirations. Sternotomy well-approximated and healing well.   HEART: Regular rate and rhythm; No murmurs, rubs, or gallops  ABDOMEN: Bowel sounds present; Soft, Nontender, Nondistended. No hepatomegally  EXTREMITIES: +1 pitting edema to bilateral ankles & forefoot. no calf tenderness bilaterally. Incision sites well approximated, healing well. R upper thigh drain in place, SS output in bulb. 2+ Peripheral Pulses, brisk capillary refill. No clubbing, cyanosis  NERVOUS SYSTEM:  Alert & Oriented X3, speech clear. No deficits       LABS:                        9.7    18.54 )-----------( 408      ( 01 Aug 2022 05:30 )             29.9     COUMADIN: No        137  |  100  |  21  ----------------------------<  114<H>  4.7   |  25  |  1.15    Ca    8.9      01 Aug 2022 05:30  Phos  4.1     08-  Mg     2.2     08-    Urinalysis Basic - ( 01 Aug 2022 08:11 )    Color: Yellow / Appearance: Clear / S.025 / pH: x  Gluc: x / Ketone: NEGATIVE  / Bili: Negative / Urobili: 0.2 E.U./dL   Blood: x / Protein: NEGATIVE mg/dL / Nitrite: NEGATIVE   Leuk Esterase: NEGATIVE / RBC: x / WBC x   Sq Epi: x / Non Sq Epi: x / Bacteria: x    Discharge CXR:  < from: Xray Chest 1 View- PORTABLE-Routine (Xray Chest 1 View- PORTABLE-Routine in AM.) (22 @ 06:52) >  FINDINGS: Stable heartsize. Median sternotomy wires are present. Small   left pleural effusion. Decreased opacity in the left lung. Improved   pulmonary vascular congestion. No pneumothorax. Pericardial drain remains   in place.    IMPRESSION: Improved pulmonary vascular congestion. Decreased opacity of   the left lung.    < end of copied text >      Discharge ECHO: results pending

## 2022-08-01 NOTE — PROGRESS NOTE ADULT - SUBJECTIVE AND OBJECTIVE BOX
IDENTIFICATION:  This is a 57yoM with newly diagnosed CAD presenting with NSTEMI S/P CABG (LIMA to LAD, SVG to PDA, SVG to OM, SVG to Diagonal) with right common femoral artery (CFA) pseudoaneurysm S/P cutdown with primary repair on 22.     EVENTS:   - LE Duplex US: No DVT    SUBJECTIVE:   - Notes no new pain in extremities  - Denies CP, SOB  - Denies new concerns    MEDICATIONS  (STANDING):  aspirin  chewable 81 milliGRAM(s) Oral daily  atorvastatin 40 milliGRAM(s) Oral at bedtime  BACItracin   Ointment 1 Application(s) Topical daily  bisacodyl 5 milliGRAM(s) Oral every 12 hours  chlorhexidine 2% Cloths 1 Application(s) Topical daily  dextrose 5%. 1000 milliLiter(s) (50 mL/Hr) IV Continuous <Continuous>  dextrose 5%. 1000 milliLiter(s) (100 mL/Hr) IV Continuous <Continuous>  dextrose 50% Injectable 25 Gram(s) IV Push once  dextrose 50% Injectable 12.5 Gram(s) IV Push once  dextrose 50% Injectable 25 Gram(s) IV Push once  gabapentin 300 milliGRAM(s) Oral every 8 hours  glucagon  Injectable 1 milliGRAM(s) IntraMuscular once  heparin   Injectable 5000 Unit(s) SubCutaneous every 8 hours  insulin lispro (ADMELOG) corrective regimen sliding scale   SubCutaneous Before meals and at bedtime  metoprolol tartrate 50 milliGRAM(s) Oral every 6 hours  pantoprazole    Tablet 40 milliGRAM(s) Oral before breakfast  polyethylene glycol 3350 17 Gram(s) Oral daily  senna 2 Tablet(s) Oral at bedtime  sodium chloride 0.9% lock flush 3 milliLiter(s) IV Push every 8 hours  sodium chloride 0.9%. 1000 milliLiter(s) (10 mL/Hr) IV Continuous <Continuous>    MEDICATIONS  (PRN):  acetaminophen     Tablet .. 650 milliGRAM(s) Oral every 6 hours PRN Mild Pain (1 - 3)  dextrose Oral Gel 15 Gram(s) Oral once PRN Blood Glucose LESS THAN 70 milliGRAM(s)/deciliter  diphenhydrAMINE 25 milliGRAM(s) Oral every 6 hours PRN Rash and/or Itching  melatonin 5 milliGRAM(s) Oral at bedtime PRN Insomnia  oxyCODONE    IR 5 milliGRAM(s) Oral every 4 hours PRN Moderate Pain (4 - 6)  oxyCODONE    IR 10 milliGRAM(s) Oral every 6 hours PRN Severe Pain (7 - 10)      Vital Signs Last 24 Hrs  T(C): 36.3 (01 Aug 2022 09:34), Max: 36.8 (2022 13:25)  T(F): 97.3 (01 Aug 2022 09:34), Max: 98.2 (2022 13:25)  HR: 86 (01 Aug 2022 09:17) (74 - 92)  BP: 115/67 (01 Aug 2022 09:17) (100/59 - 131/65)  BP(mean): 82 (01 Aug 2022 09:17) (75 - 90)  RR: 18 (01 Aug 2022 09:17) (12 - 18)  SpO2: 98% (01 Aug 2022 09:17) (97% - 98%)    Parameters below as of 01 Aug 2022 09:17  Patient On (Oxygen Delivery Method): room air        Neurologic: AAOx3  Cardiac: Normal rate, regular rhythm  Vascular: Doppler signals multiphasic in DP/PT bilaterally  Pulm: Breathing comfortably  Abd: Soft, non-distended  Incision: Groin incision without erythema/edema/induration.   Drain: Thin fluid  : No [place]  Skin: No rashes  Extremities: No edema.  Psychiatric: Interacting normally.     I&O's Detail    2022 07:01  -  01 Aug 2022 07:00  --------------------------------------------------------  IN:  Total IN: 0 mL    OUT:    Bulb (mL): 0 mL    Bulb (mL): 50 mL    Voided (mL): 2075 mL  Total OUT: 2125 mL    Total NET: -2125 mL          LABS:                        9.7    18.54 )-----------( 408      ( 01 Aug 2022 05:30 )             29.9     08-01    137  |  100  |  21  ----------------------------<  114<H>  4.7   |  25  |  1.15    Ca    8.9      01 Aug 2022 05:30  Phos  4.1     08-  Mg     2.2     08-        Urinalysis Basic - ( 01 Aug 2022 08:11 )    Color: Yellow / Appearance: Clear / S.025 / pH: x  Gluc: x / Ketone: NEGATIVE  / Bili: Negative / Urobili: 0.2 E.U./dL   Blood: x / Protein: NEGATIVE mg/dL / Nitrite: NEGATIVE   Leuk Esterase: NEGATIVE / RBC: x / WBC x   Sq Epi: x / Non Sq Epi: x / Bacteria: x

## 2022-08-01 NOTE — DISCHARGE NOTE PROVIDER - NSDCFUSCHEDAPPT_GEN_ALL_CORE_FT
Valentin Benavides  [facility]  VASCULAR [address]  Scheduled Appointment: 08/15/2022    SYLVIA Bran  [facility]  CTSURG [address]  Scheduled Appointment: 08/15/2022    Evans Mena  [facility]  HEARTVASC [address]  Scheduled Appointment: 08/16/2022

## 2022-08-01 NOTE — PROGRESS NOTE ADULT - ASSESSMENT
58 YO Male w/o no significant PMHx, recent COVID infection 3 weeks ago, who initially presented to [facility] ED on 7/19 c/o exertional chest pain X1 week, relieved with rest. In the ED patient noted to have elevated troponin and was ruled in for NSTEMI. He was given aspirin and brilinta and taken to the cath lab. Cath revealed 3VCAD: LAD 90%, D1 80%, LCx 90%, OM1 100% thrombotic occlusion with collaterals from RCA, RPDA 80%. IABP was placed and patient was brought to the ICU post cath. IABP was removed the next day and decision made to transfer to [facility] under the care of Dr. Bran for surgical evaluation. On 7/25/22 patient underwent CABG x4 (LIMA to LAD, SVG to OM, SVG to Diag), repair of Right pseudoanuerysm. Patient recovered in CTICU on Levo and sulaiman.  Postoperative course complicated by right groin hematoma.  POD 1 received 2 units PRBC, TTE with small to mod loculated pericardial effusion.  POD 2 received 1 unit PRBC, CT chest with moderate pericardial effusion, IR placed pericardial drain with 250cc output.  Incidentally found to be covid +.  POD 3 Prevena dressing replaced, pleural tube removed, BB titrated, transferred to [facility].  POD 4 titrating BB, pericardial drain remains.  POD 5 ECHO pending Russell Bran (MD)  Thoracic and Cardiac Surgery  [address] [place] [address]  Phone: [phone]  Fax: [phone]  Scheduled Appointment: 08/15/2022 02:30 PM    Valentin Benavides)  Vascular Surgery  [address] [place] [address]  Phone: [phone]  Fax: [phone]  Scheduled Appointment: 08/15/2022 01:30 PM    Evans Mena)  Cardiovascular Disease; Internal Medicine  [address] [place] [address]  Phone: [phone]  Fax: [phone]  Scheduled Appointment: 08/16/2022 10:00 AM

## 2022-08-01 NOTE — DISCHARGE NOTE PROVIDER - CARE PROVIDER_API CALL
Russell Bran (MD)  Thoracic and Cardiac Surgery  [address], [address] [place] [address]  Phone: [phone]  Fax: [phone]  Scheduled Appointment: 08/15/2022 02:30 PM    Valentin Benavides)  Vascular Surgery  [address], [address] [place] [address]  Phone: [phone]  Fax: [phone]  Scheduled Appointment: 08/15/2022 01:30 PM    Evans Mena)  Cardiovascular Disease; Internal Medicine  [address] [place] [address]  Phone: [phone]  Fax: [phone]  Scheduled Appointment: 08/16/2022 10:00 AM

## 2022-08-01 NOTE — DISCHARGE NOTE NURSING/CASE MANAGEMENT/SOCIAL WORK - NSDCPEFALRISK_GEN_ALL_CORE
For information on Fall & Injury Prevention, visit: [email or web address] OR  [email or web address] OR  https://www.cdc.gov/steadi/patient.html

## 2022-08-03 ENCOUNTER — APPOINTMENT (OUTPATIENT)
Dept: CARE COORDINATION | Facility: HOME HEALTH | Age: 57
End: 2022-08-03

## 2022-08-03 VITALS
SYSTOLIC BLOOD PRESSURE: 122 MMHG | HEART RATE: 88 BPM | OXYGEN SATURATION: 99 % | RESPIRATION RATE: 12 BRPM | DIASTOLIC BLOOD PRESSURE: 60 MMHG | WEIGHT: 242 LBS

## 2022-08-03 LAB — SURGICAL PATHOLOGY STUDY: SIGNIFICANT CHANGE UP

## 2022-08-03 PROCEDURE — 99024 POSTOP FOLLOW-UP VISIT: CPT

## 2022-08-03 NOTE — REVIEW OF SYSTEMS
[Heart Rate Is Slow] : the heart rate was not slow [Heart Rate Is Fast] : the heart rate was not fast [Chest Pain] : no chest pain [Palpitations] : no palpitations [Leg Claudication] : no intermittent leg claudication [Lower Ext Edema] : no extremity edema [Shortness Of Breath] : no shortness of breath [Wheezing] : no wheezing [Cough] : no cough [SOB on Exertion] : no shortness of breath during exertion [Orthopnea] : no orthopnea [PND] : no PND [Suicidal] : not suicidal [Sleep Disturbances] : no sleep disturbances [Anxiety] : anxiety [Depression] : no depression [Change In Personality] : no personality change [Emotional Problems] : no emotional problems [Negative] : Heme/Lymph [FreeTextEntry5] : verbalized incisional chest discomfort at night, alleviated with oxycodone

## 2022-08-03 NOTE — PHYSICAL EXAM
[Sclera] : the sclera and conjunctiva were normal [PERRL With Normal Accommodation] : pupils were equal in size, round, and reactive to light [Extraocular Movements] : extraocular movements were intact [Neck Appearance] : the appearance of the neck was normal [] : no respiratory distress [Respiration, Rhythm And Depth] : normal respiratory rhythm and effort [Exaggerated Use Of Accessory Muscles For Inspiration] : no accessory muscle use [Auscultation Breath Sounds / Voice Sounds] : lungs were clear to auscultation bilaterally [Apical Impulse] : the apical impulse was normal [Heart Rate And Rhythm] : heart rate was normal and rhythm regular [Heart Sounds] : normal S1 and S2 [Heart Sounds Gallop] : no gallops [Murmurs] : no murmurs [Heart Sounds Pericardial Friction Rub] : no pericardial rub [Examination Of The Chest] : the chest was normal in appearance [Chest Visual Inspection Thoracic Asymmetry] : no chest asymmetry [Diminished Respiratory Excursion] : normal chest expansion [2+] : left 2+ [Breast Appearance] : normal in appearance [Bowel Sounds] : normal bowel sounds [Abdomen Soft] : soft [Abdomen Tenderness] : non-tender [Abnormal Walk] : normal gait [Nail Clubbing] : no clubbing  or cyanosis of the fingernails [Musculoskeletal - Swelling] : no joint swelling seen [Skin Color & Pigmentation] : normal skin color and pigmentation [Skin Turgor] : normal skin turgor [FreeTextEntry1] : Right groin incision with staples, scant serosanguineous oozing noted, right thigh ASHU with serous drainage. No ecchymosis noted, non- tender on palpation. Left SVG site clean, dry, and inatct, well approximated.  [Oriented To Time, Place, And Person] : oriented to person, place, and time [Impaired Insight] : insight and judgment were intact [Affect] : the affect was normal [Mood] : the mood was normal [Memory Recent] : recent memory was not impaired [Memory Remote] : remote memory was not impaired

## 2022-08-03 NOTE — HISTORY OF PRESENT ILLNESS
[FreeTextEntry1] : 56 YO Male w/o no significant PMHx, recent COVID infection 3 weeks ago, who initially presented to [facility] ED on 7/19 c/o exertional chest pain X1 week, relieved with rest. In the ED patient noted to have elevated troponin and was ruled in for NSTEMI. He was given aspirin and brilinta and taken to the cath \par lab. Cath revealed 3VCAD: LAD 90%, D1 80%, LCx 90%, OM1 100% thrombotic occlusion with collaterals from RCA, RPDA 80%. IABP was placed and patient was brought to the ICU post cath. IABP was removed the next day and decision made to transfer to [facility] under the care of Dr. Bran for surgical evaluation. \par On 7/25/22 patient underwent CABG x4 (LIMA to LAD, SVG to OM, SVG to Diag) and repair of Right pseudoanuerysm. Postoperative course complicated by right groin hematoma. On POD3 CT chest \par revealed moderate hemopericardium and IR placed pericardial drain with 250cc \par output. Prevena dressing replaced at incision site. POD 6 Echo was negative for pericardial effusion and pericardial drain was removed. RLE venous duplex performed for RLE swelling was negative for DVT. On \par POD7 patient had a repeat echo which was negative for pericardial effusion. ASHU to R upper thigh and R groin staples remain in place. \par Pt is recovering at home with his wife. He verbalizes feeling well, climbing stairs and walking outside without difficulty. Pt verbalized some feelings of anxiety since his hospitalization.\par NW initiated yesterday.\par Last BM 8/1/22\par

## 2022-08-08 ENCOUNTER — APPOINTMENT (OUTPATIENT)
Dept: VASCULAR SURGERY | Facility: CLINIC | Age: 57
End: 2022-08-08

## 2022-08-08 VITALS
BODY MASS INDEX: 31.32 KG/M2 | SYSTOLIC BLOOD PRESSURE: 138 MMHG | HEIGHT: 74 IN | DIASTOLIC BLOOD PRESSURE: 83 MMHG | HEART RATE: 80 BPM | WEIGHT: 244 LBS

## 2022-08-08 LAB
CORTICOSTEROID BINDING GLOBULIN RESULT: 1.4 MG/DL — LOW
CORTIS F/TOTAL MFR SERPL: 61 % — SIGNIFICANT CHANGE UP
CORTIS SERPL-MCNC: 24 UG/DL — HIGH
CORTISOL, FREE RESULT: 15 UG/DL — HIGH

## 2022-08-08 PROCEDURE — 99024 POSTOP FOLLOW-UP VISIT: CPT

## 2022-08-09 DIAGNOSIS — I21.4 NON-ST ELEVATION (NSTEMI) MYOCARDIAL INFARCTION: ICD-10-CM

## 2022-08-09 DIAGNOSIS — U07.1 COVID-19: ICD-10-CM

## 2022-08-09 DIAGNOSIS — I51.9 HEART DISEASE, UNSPECIFIED: ICD-10-CM

## 2022-08-09 DIAGNOSIS — T81.718A COMPLICATION OF OTHER ARTERY FOLLOWING A PROCEDURE, NOT ELSEWHERE CLASSIFIED, INITIAL ENCOUNTER: ICD-10-CM
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
## 2022-08-09 DIAGNOSIS — I31.3 PERICARDIAL EFFUSION (NONINFLAMMATORY): ICD-10-CM

## 2022-08-09 DIAGNOSIS — E66.9 OBESITY, UNSPECIFIED: ICD-10-CM

## 2022-08-09 DIAGNOSIS — I25.110 ATHEROSCLEROTIC HEART DISEASE OF NATIVE CORONARY ARTERY WITH UNSTABLE ANGINA PECTORIS: ICD-10-CM

## 2022-08-09 DIAGNOSIS — R57.1 HYPOVOLEMIC SHOCK: ICD-10-CM

## 2022-08-09 DIAGNOSIS — Z86.16 PERSONAL HISTORY OF COVID-19: ICD-10-CM
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
## 2022-08-09 DIAGNOSIS — I31.2 HEMOPERICARDIUM, NOT ELSEWHERE CLASSIFIED: ICD-10-CM

## 2022-08-09 DIAGNOSIS — E87.2 ACIDOSIS: ICD-10-CM

## 2022-08-09 DIAGNOSIS — R57.8 OTHER SHOCK: ICD-10-CM

## 2022-08-09 DIAGNOSIS — D62 ACUTE POSTHEMORRHAGIC ANEMIA: ICD-10-CM

## 2022-08-09 DIAGNOSIS — I72.4 ANEURYSM OF ARTERY OF LOWER EXTREMITY: ICD-10-CM

## 2022-08-10 NOTE — ADDENDUM
[FreeTextEntry1] : I, Dr. Valentin Benavides, personally performed the evaluation and management (E/M) services for this established patient who presents today with (an) existing condition(s).  That E/M includes conducting the examination, assessing all conditions, and (re)establishing/reinforcing a plan of care.  Today, my ACP, Steff SAUNDERS, was here to observe my evaluation and management services for this condition to be followed going forward.\par \par The documentation for this encounter was entered by Brian Syed acting as scribe for Dr. Valentin Benavides.\par \par \par

## 2022-08-10 NOTE — PHYSICAL EXAM
[2+] : left 2+ [Alert] : alert [Calm] : calm [Ankle Swelling (On Exam)] : not present [Abdomen Tenderness] : ~T ~M No abdominal tenderness [de-identified] : WN/WD [de-identified] : NC/AT [de-identified] : supple [FreeTextEntry1] : R groin: staples intact, small incision dehiscence ~2cm, clean and no drainage. No signs of infection.\par ASHU to R upper thigh remain in place.  [de-identified] : +FROM 5/5x4

## 2022-08-10 NOTE — HISTORY OF PRESENT ILLNESS
[FreeTextEntry1] : 56 yo male w/o no significant PMHx, recent COVID infection, who initially presented to [facility] ED on 7/19 c/o exertional chest pain X1 week, relieved with rest. In the ED patient noted to have elevated troponin and was \par ruled in for NSTEMI. Cath revealed 3VCAD: LAD 90%, D1 80%, LCx 90%, OM1 100% thrombotic occlusion with collaterals from RCA, RPDA 80%.\par On 7/25/22 patient underwent CABG x4 (LIMA to LAD, SVG to OM, SVG to Diag) and repair of Right Femoral artery secondary to pseudoaneurysm after IABP placement.  ASHU to R upper thigh and R groin staples remain in place and he returns today for a post op visit. Patient is feeling well, walking frequently and lost some weight. He reports ASHU drain minimal, less than 20 cc's, this morning and last night. Patients wife asked if he can shower and lift weights any time soon. He is wearing compression stockings regularly. Denies rest pain, fever or chills,

## 2022-08-10 NOTE — ASSESSMENT
[Ulcer Care] : ulcer care [FreeTextEntry1] : 58 yo male w/o no significant PMHx, recent COVID infection, who initially presented to [facility] ED on 7/19 c/o exertional chest pain X1 week, relieved with rest. In the ED patient noted to have elevated troponin and was \par ruled in for NSTEMI. Cath revealed 3VCAD: LAD 90%, D1 80%, LCx 90%, OM1 100% thrombotic occlusion with collaterals from RCA, RPDA 80%.\par On 7/25/22 patient underwent CABG x4 (LIMA to LAD, SVG to OM, SVG to Diag) and repair of Right Femoral artery secondary to pseudoaneurysm after IABP placement.  ASHU to R upper thigh and R groin staples remain in place and he returns today for a post op visit. Patient is feeling well overall, slowly recovering after surgeries.\par \par On exam, small incision dehiscence ~2cm clean at the R groin, staples in place, ASHU drain to the R upper thigh in place. No drainage or signs of infection. \par ASHU drain and all staples were removed from the R groin today. We recommended cleaning R groin with peroxide and dress with gauze daily. Patient can shower, clean groin with soap and water. No heaving lifting, or using the pool for the next 6 weeks. C/w compression stockings.\par FU in 1 week for sutures removal.\par

## 2022-08-15 ENCOUNTER — OUTPATIENT (OUTPATIENT)
Dept: OUTPATIENT SERVICES | Facility: HOSPITAL | Age: 57
LOS: 1 days | End: 2022-08-15
Payer: COMMERCIAL

## 2022-08-15 ENCOUNTER — APPOINTMENT (OUTPATIENT)
Dept: CARDIOTHORACIC SURGERY | Facility: CLINIC | Age: 57
End: 2022-08-15

## 2022-08-15 ENCOUNTER — APPOINTMENT (OUTPATIENT)
Dept: VASCULAR SURGERY | Facility: CLINIC | Age: 57
End: 2022-08-15

## 2022-08-15 VITALS
BODY MASS INDEX: 31.32 KG/M2 | DIASTOLIC BLOOD PRESSURE: 87 MMHG | HEART RATE: 73 BPM | WEIGHT: 244 LBS | SYSTOLIC BLOOD PRESSURE: 131 MMHG | HEIGHT: 74 IN

## 2022-08-15 VITALS
TEMPERATURE: 97 F | OXYGEN SATURATION: 99 % | HEIGHT: 74 IN | HEART RATE: 75 BPM | RESPIRATION RATE: 16 BRPM | BODY MASS INDEX: 30.16 KG/M2 | SYSTOLIC BLOOD PRESSURE: 150 MMHG | WEIGHT: 235 LBS | DIASTOLIC BLOOD PRESSURE: 78 MMHG

## 2022-08-15 DIAGNOSIS — U07.1 COVID-19: ICD-10-CM

## 2022-08-15 LAB
ALBUMIN SERPL ELPH-MCNC: 4.4 G/DL — SIGNIFICANT CHANGE UP (ref 3.3–5)
ALP SERPL-CCNC: 134 U/L — HIGH (ref 40–120)
ALT FLD-CCNC: 35 U/L — SIGNIFICANT CHANGE UP (ref 10–45)
ANION GAP SERPL CALC-SCNC: 9 MMOL/L — SIGNIFICANT CHANGE UP (ref 5–17)
AST SERPL-CCNC: 24 U/L — SIGNIFICANT CHANGE UP (ref 10–40)
BILIRUB SERPL-MCNC: 0.7 MG/DL — SIGNIFICANT CHANGE UP (ref 0.2–1.2)
BUN SERPL-MCNC: 20 MG/DL — SIGNIFICANT CHANGE UP (ref 7–23)
CALCIUM SERPL-MCNC: 9.7 MG/DL — SIGNIFICANT CHANGE UP (ref 8.4–10.5)
CHLORIDE SERPL-SCNC: 100 MMOL/L — SIGNIFICANT CHANGE UP (ref 96–108)
CO2 SERPL-SCNC: 28 MMOL/L — SIGNIFICANT CHANGE UP (ref 22–31)
CREAT SERPL-MCNC: 1.14 MG/DL — SIGNIFICANT CHANGE UP (ref 0.5–1.3)
EGFR: 76 ML/MIN/1.73M2 — SIGNIFICANT CHANGE UP
GLUCOSE SERPL-MCNC: 104 MG/DL — HIGH (ref 70–99)
POTASSIUM SERPL-MCNC: 4.8 MMOL/L — SIGNIFICANT CHANGE UP (ref 3.5–5.3)
POTASSIUM SERPL-SCNC: 4.8 MMOL/L — SIGNIFICANT CHANGE UP (ref 3.5–5.3)
PROT SERPL-MCNC: 7.9 G/DL — SIGNIFICANT CHANGE UP (ref 6–8.3)
SODIUM SERPL-SCNC: 137 MMOL/L — SIGNIFICANT CHANGE UP (ref 135–145)

## 2022-08-15 PROCEDURE — 36415 COLL VENOUS BLD VENIPUNCTURE: CPT

## 2022-08-15 PROCEDURE — 99024 POSTOP FOLLOW-UP VISIT: CPT

## 2022-08-15 PROCEDURE — 80053 COMPREHEN METABOLIC PANEL: CPT

## 2022-08-15 PROCEDURE — 71046 X-RAY EXAM CHEST 2 VIEWS: CPT | Mod: 26

## 2022-08-15 PROCEDURE — 71046 X-RAY EXAM CHEST 2 VIEWS: CPT

## 2022-08-16 ENCOUNTER — APPOINTMENT (OUTPATIENT)
Dept: HEART AND VASCULAR | Facility: CLINIC | Age: 57
End: 2022-08-16

## 2022-08-16 VITALS
BODY MASS INDEX: 30.16 KG/M2 | OXYGEN SATURATION: 98 % | SYSTOLIC BLOOD PRESSURE: 135 MMHG | TEMPERATURE: 98.7 F | WEIGHT: 235 LBS | HEART RATE: 77 BPM | DIASTOLIC BLOOD PRESSURE: 70 MMHG | HEIGHT: 74 IN

## 2022-08-16 PROCEDURE — 93000 ELECTROCARDIOGRAM COMPLETE: CPT

## 2022-08-16 PROCEDURE — 99214 OFFICE O/P EST MOD 30 MIN: CPT | Mod: 25

## 2022-08-16 PROCEDURE — 99204 OFFICE O/P NEW MOD 45 MIN: CPT | Mod: 25

## 2022-08-16 RX ORDER — OXYCODONE 5 MG/1
5 TABLET ORAL EVERY 6 HOURS
Refills: 0 | Status: DISCONTINUED | COMMUNITY
Start: 2022-08-02 | End: 2022-08-16

## 2022-08-17 ENCOUNTER — NON-APPOINTMENT (OUTPATIENT)
Age: 57
End: 2022-08-17

## 2022-08-17 NOTE — DISCUSSION/SUMMARY
[EKG obtained to assist in diagnosis and management of assessed problem(s)] : EKG obtained to assist in diagnosis and management of assessed problem(s) [FreeTextEntry1] : 56 y/o M with PMHx of CAD s/p CABGx4, Rt CFA PSA repair (c/b pericardial effusion) 7/25/2022, HTN, HLD here for follow up\par \par # CAD\par Revascularized CABGx4\par Doing well post op, feels better every day\par Continue ASA, Statin, BB\par \par # ICM\par EF 45%\par Repeat Echo in 3-4 month post revasc\par Continue BB\par Start Lisinopril 5mg daily\par \par # HLD\par Lipitor 40mg daily\par \par # HTN\par BPs at goal\par Conitnue current regimen\par \par Follow with Vascular surgery until complete healing of Rt CFA wound

## 2022-08-17 NOTE — HISTORY OF PRESENT ILLNESS
[FreeTextEntry1] : 56 y/o M with PMHx of CAD s/p CABGx4, Rt CFA PSA repair (c/b pericardial effusion) 7/25/2022, HTN, HLD here for follow up post surgery / hospitalization\par \par Cath 7/19/2022: 3V CAD (90% prox LAD, 80% prox D1, 90% prox LCx, 100% OM, 80% RPDA)\par s/p CABG x4 (LIMA to LAD, SVGs to D1, OM and RPDA)\par TTE 8/1/2022: EF 45%, no effusion \par EKG 8/16/2022: NSR HR 67, Non-specific ST-T changes\par \par Patient reports that he has been doing well. Denies chest pain, SOB\par Healing without issues, still has some drainage from Rt CFA repair site, keeping clean. Denies fevers, chills\par Following with CTS and Vascular surgery

## 2022-08-17 NOTE — PHYSICAL EXAM
[Well Developed] : well developed [Well Nourished] : well nourished [No Acute Distress] : no acute distress [Normal Conjunctiva] : normal conjunctiva [Normal Venous Pressure] : normal venous pressure [No Carotid Bruit] : no carotid bruit [Normal S1, S2] : normal S1, S2 [No Murmur] : no murmur [No Rub] : no rub [No Gallop] : no gallop [Clear Lung Fields] : clear lung fields [Good Air Entry] : good air entry [No Respiratory Distress] : no respiratory distress  [Soft] : abdomen soft [Non Tender] : non-tender [No Masses/organomegaly] : no masses/organomegaly [Normal Bowel Sounds] : normal bowel sounds [Normal Gait] : normal gait [No Edema] : no edema [No Cyanosis] : no cyanosis [No Clubbing] : no clubbing [No Varicosities] : no varicosities [No Rash] : no rash [No Skin Lesions] : no skin lesions [Moves all extremities] : moves all extremities [No Focal Deficits] : no focal deficits [Normal Speech] : normal speech [Alert and Oriented] : alert and oriented [Normal memory] : normal memory [de-identified] : Rt femoral site has open wound, no drainage or erythema

## 2022-08-17 NOTE — REVIEW OF SYSTEMS
[Feeling Fatigued] : feeling fatigued [Negative] : Heme/Lymph [SOB] : no shortness of breath [Dyspnea on exertion] : not dyspnea during exertion [Chest Discomfort] : no chest discomfort [Lower Ext Edema] : no extremity edema [Leg Claudication] : no intermittent leg claudication [Palpitations] : no palpitations [Orthopnea] : no orthopnea [PND] : no PND [Syncope] : no syncope

## 2022-08-17 NOTE — CARDIOLOGY SUMMARY
[de-identified] : 8/1/2022\par CONCLUSIONS:\par \par  1. Mildly reduced left ventricular systolic function.\par  2. Basal inferoseptal segment, apical lateral segment, basal and mid \par inferolateral wall, and basal inferior segment are abnormal.\par  3. Mild symmetric left ventricular hypertrophy.\par  4. Mildly reduced right ventricular systolic function.\par  5. Normal right ventricular size.\par  6. No significant valvular disease.\par  7. No evidence of pulmonary hypertension.\par  8. No pericardial effusion. [de-identified] : 7/19/2022\par High risk NSTEMI\par 3V CAD\par Mild-mod LV dysfunction\par \par LM: Normal\par LAD: 90% prox stenosis; D1 large, 80% prox stenosis\par LCX: 90% prox stenosis; OM1 100% thrombotic occlusion with collaterals from RCA\par RCA: Mild luminal irregularites, dominant vessel\par RPDA: 80% prox stenosis\par \par LVEF 40-45%, lateral wall hypokiensis\par EDP 22mmHg, No AS, Mild MR\par \par IABP inserted via Rt CFA

## 2022-08-19 NOTE — PHYSICAL EXAM
[2+] : left 2+ [Alert] : alert [Calm] : calm [Ankle Swelling (On Exam)] : not present [Abdomen Tenderness] : ~T ~M No abdominal tenderness [de-identified] : WN/WD [de-identified] : NC/AT [de-identified] : supple [FreeTextEntry1] : R groin:  small incision dehiscence ~2cm, clean and no drainage. Sutures in place. No signs of infection.\par  [de-identified] : +FROM 5/5x4

## 2022-08-19 NOTE — ADDENDUM
[FreeTextEntry1] : I, Dr. Valentin Benavides, personally performed the evaluation and management (E/M) services for this established patient who presents today with (an) existing condition(s).  That E/M includes conducting the examination, assessing all conditions, and (re)establishing/reinforcing a plan of care.  Today, my ACP, Steff SAUNDERS, was here to observe my evaluation and management services for this condition to be followed going forward.\par \par The documentation for this encounter was entered by Brian Syed acting as scribe for Dr. Valentin Benavides.\par \par

## 2022-08-19 NOTE — REASON FOR VISIT
[Spouse] : spouse [de-identified] : R common femoral artery repair, sartorius muscle flap [de-identified] : 7/25/22 [de-identified] : 56 yo male w/o no significant PMHx, recent COVID infection, who initially presented to [facility] ED on 7/19 c/o exertional chest pain X1 week, relieved with rest. In the ED patient noted to have elevated troponin and was \par ruled in for NSTEMI. On 7/25/22 patient underwent CABG x4 (LIMA to LAD, SVG to OM, SVG to Diag) and repair of Right Femoral artery secondary to pseudoaneurysm after IABP placement. He was seen on 8/8/22 for a post op visit, staples and ASHU drain removed. Patient is feeling well, no new complaints. He continues to have drainage from R groin incision. Denies swelling,rest pain, foul smell, fever or chills.

## 2022-08-19 NOTE — DISCUSSION/SUMMARY
[FreeTextEntry1] : 58 yo male, s/p recent  NSTEMI. On 7/25/22 patient underwent CABG x4 (LIMA to LAD, SVG to OM, SVG to Diag) and repair of Right Femoral artery secondary to pseudoaneurysm after IABP placement. He was seen on 8/8/22 for a post op visit, staples and ASHU drain removed. Patient is feeling well, denies rest pain, fever or chills. \par R groin incision healing well, no signs of infection. Wound was cleaned with peroxide and removed stitches in office. \par Continue cleaning w/ peroxide and wet to dry dressing. \par FU in 2 weeks. \par

## 2022-08-20 LAB
CULTURE RESULTS: NO GROWTH — SIGNIFICANT CHANGE UP
SPECIMEN SOURCE: SIGNIFICANT CHANGE UP

## 2022-08-25 ENCOUNTER — NON-APPOINTMENT (OUTPATIENT)
Age: 57
End: 2022-08-25

## 2022-08-25 RX ORDER — FUROSEMIDE 40 MG/1
40 TABLET ORAL DAILY
Refills: 0 | Status: DISCONTINUED | COMMUNITY
Start: 2022-08-02 | End: 2022-08-25

## 2022-08-29 ENCOUNTER — APPOINTMENT (OUTPATIENT)
Dept: VASCULAR SURGERY | Facility: CLINIC | Age: 57
End: 2022-08-29

## 2022-08-29 VITALS
DIASTOLIC BLOOD PRESSURE: 82 MMHG | SYSTOLIC BLOOD PRESSURE: 134 MMHG | HEIGHT: 74 IN | BODY MASS INDEX: 30.16 KG/M2 | WEIGHT: 235 LBS | HEART RATE: 67 BPM

## 2022-08-29 PROCEDURE — 99024 POSTOP FOLLOW-UP VISIT: CPT

## 2022-08-30 ENCOUNTER — TRANSCRIPTION ENCOUNTER (OUTPATIENT)
Age: 57
End: 2022-08-30

## 2022-09-01 NOTE — HISTORY OF PRESENT ILLNESS
[FreeTextEntry1] : 56 yo male w/o no significant PMHx, recent COVID infection, who initially presented to [facility] ED on 7/19 c/o exertional chest pain X1 week, relieved with rest. In the ED patient noted to have elevated troponin and was \par ruled in for NSTEMI. On 7/25/22 patient underwent CABG x4 (LIMA to LAD, SVG to OM, SVG to Diag) and repair of Right Femoral artery secondary to pseudoaneurysm after IABP placement. He was seen on 8/8/22 for a post op visit, staples and ASHU drain removed. Patient is feeling well, no new complaints. He reports occasional numbness from the toes and inner thighs. He continues to have drainage from R groin incision, but states that it's less than before. Denies swelling,rest pain, foul smell, fever or chills. \par

## 2022-09-01 NOTE — PHYSICAL EXAM
[Alert] : alert [Calm] : calm [2+] : left 2+ [Ankle Swelling (On Exam)] : not present [Abdomen Tenderness] : ~T ~M No abdominal tenderness [de-identified] : WN/WD [de-identified] : NC/AT [de-identified] : supple [FreeTextEntry1] : R groin: small incision dehiscence ~1cm, clean and no drainage. No signs of infection.\par  [de-identified] : +FROM 5/5x4

## 2022-09-01 NOTE — ASSESSMENT
[Ulcer Care] : ulcer care [FreeTextEntry1] : 58 yo male, s/p recent NSTEMI. On 7/25/22 patient underwent CABG x4 (LIMA to LAD, SVG to OM, SVG to Diag) and repair of Right Femoral artery secondary to pseudoaneurysm after IABP placement. He was seen on 8/8/22 for a post op visit, staples and ASHU drain removed. Patient is feeling well, denies rest pain, fever or chills. On exam, palpable pedal pulses.R groin incision healing well, no signs of infection. We recommend to keep groin incision clean, wash with soap and wrap with dry gauze. Patient may FU as needed.

## 2022-09-19 ENCOUNTER — APPOINTMENT (OUTPATIENT)
Dept: CARDIOTHORACIC SURGERY | Facility: CLINIC | Age: 57
End: 2022-09-19

## 2022-09-19 VITALS
WEIGHT: 230 LBS | HEART RATE: 60 BPM | HEIGHT: 74 IN | BODY MASS INDEX: 29.52 KG/M2 | RESPIRATION RATE: 16 BRPM | OXYGEN SATURATION: 99 % | TEMPERATURE: 97 F | SYSTOLIC BLOOD PRESSURE: 178 MMHG | DIASTOLIC BLOOD PRESSURE: 87 MMHG

## 2022-09-19 DIAGNOSIS — I72.4 ANEURYSM OF ARTERY OF LOWER EXTREMITY: ICD-10-CM

## 2022-09-19 DIAGNOSIS — Z09 ENCOUNTER FOR FOLLOW-UP EXAMINATION AFTER COMPLETED TREATMENT FOR CONDITIONS OTHER THAN MALIGNANT NEOPLASM: ICD-10-CM

## 2022-09-19 DIAGNOSIS — I25.110 ATHEROSCLEROTIC HEART DISEASE OF NATIVE CORONARY ARTERY WITH UNSTABLE ANGINA PECTORIS: ICD-10-CM

## 2022-09-19 DIAGNOSIS — Z95.1 PRESENCE OF AORTOCORONARY BYPASS GRAFT: ICD-10-CM

## 2022-09-19 PROCEDURE — 99024 POSTOP FOLLOW-UP VISIT: CPT

## 2022-09-20 PROBLEM — I72.4 PSEUDOANEURYSM OF FEMORAL ARTERY: Status: ACTIVE | Noted: 2022-08-10

## 2022-09-20 PROBLEM — Z95.1 S/P CABG X 4: Status: ACTIVE | Noted: 2022-08-03

## 2022-09-20 PROBLEM — Z09 POSTOP CHECK: Status: ACTIVE | Noted: 2022-08-08

## 2022-09-20 PROBLEM — I25.110 CORONARY ARTERY DISEASE INVOLVING NATIVE CORONARY ARTERY OF NATIVE HEART WITH UNSTABLE ANGINA PECTORIS: Status: ACTIVE | Noted: 2022-08-03

## 2022-09-20 RX ORDER — FUROSEMIDE 40 MG/1
40 TABLET ORAL
Qty: 30 | Refills: 0 | Status: COMPLETED | COMMUNITY
Start: 2022-08-15 | End: 2022-09-20

## 2022-09-20 RX ORDER — POTASSIUM CHLORIDE 1500 MG/1
20 TABLET, EXTENDED RELEASE ORAL
Refills: 0 | Status: COMPLETED | COMMUNITY
Start: 2022-08-02 | End: 2022-09-20

## 2022-09-20 RX ORDER — POTASSIUM CHLORIDE 1500 MG/1
20 TABLET, FILM COATED, EXTENDED RELEASE ORAL
Qty: 30 | Refills: 0 | Status: COMPLETED | COMMUNITY
Start: 2022-08-15 | End: 2022-09-20

## 2022-09-22 ENCOUNTER — APPOINTMENT (OUTPATIENT)
Dept: PULMONOLOGY | Facility: CLINIC | Age: 57
End: 2022-09-22

## 2022-09-22 VITALS
DIASTOLIC BLOOD PRESSURE: 90 MMHG | BODY MASS INDEX: 29.77 KG/M2 | WEIGHT: 232 LBS | SYSTOLIC BLOOD PRESSURE: 140 MMHG | HEART RATE: 70 BPM | OXYGEN SATURATION: 99 % | HEIGHT: 74 IN

## 2022-09-22 DIAGNOSIS — G47.33 OBSTRUCTIVE SLEEP APNEA (ADULT) (PEDIATRIC): ICD-10-CM

## 2022-09-22 DIAGNOSIS — R06.83 SNORING: ICD-10-CM

## 2022-09-22 DIAGNOSIS — R06.81 APNEA, NOT ELSEWHERE CLASSIFIED: ICD-10-CM

## 2022-09-22 DIAGNOSIS — Z91.89 OTHER SPECIFIED PERSONAL RISK FACTORS, NOT ELSEWHERE CLASSIFIED: ICD-10-CM

## 2022-09-22 DIAGNOSIS — R40.0 SOMNOLENCE: ICD-10-CM

## 2022-09-22 PROCEDURE — 99203 OFFICE O/P NEW LOW 30 MIN: CPT

## 2022-09-22 PROCEDURE — 99213 OFFICE O/P EST LOW 20 MIN: CPT

## 2022-09-22 RX ORDER — ACETAMINOPHEN 325 MG/1
325 TABLET ORAL
Refills: 0 | Status: DISCONTINUED | COMMUNITY
Start: 2022-08-02 | End: 2022-09-22

## 2022-09-22 RX ORDER — PANTOPRAZOLE 40 MG/1
40 TABLET, DELAYED RELEASE ORAL DAILY
Qty: 90 | Refills: 3 | Status: DISCONTINUED | COMMUNITY
Start: 2022-08-02 | End: 2022-09-22

## 2022-09-22 NOTE — ASSESSMENT
[FreeTextEntry1] : 56 yo M w/ recently diagnosed CAD (MI in 7/2022 followed by CABG x4 (LIMA to LAD, SVG to OM, SVG to Diag)) with course complicated by a right pseudoaneurysm, referred by CT surgery for TAYLOR evaluation.\par \par r/o TAYLOR - constellation of his symptoms and physical examination are suggestive of obstructive sleep apnea. He does have CAD with a documented low EF of 45% as of echocardiogram last month. However he is being evaluated with another echo soon and on examination has no evidence of hypervolemia. He still is at risk for central sleep apnea though and to further assess, will start eval with home sleep test (this will also potentially deliver therapy sooner). If results are inconclusive, then will move on to Split night study.\par The ramifications of obstructive sleep apnea and its potential therapeutic modalities were discussed with the patient. The dangers of drowsy driving were discussed with the patient. The patient was warned to avoid drowsy driving. The patient will followup after results of this study are available.

## 2022-09-22 NOTE — CONSULT LETTER
[Dear  ___] : Dear  [unfilled], [Consult Letter:] : I had the pleasure of evaluating your patient, [unfilled]. [Consult Closing:] : Thank you very much for allowing me to participate in the care of this patient.  If you have any questions, please do not hesitate to contact me. [Sincerely,] : Sincerely, [FreeTextEntry2] : Dr. Bran (CT Surgery) [FreeTextEntry1] : Upon evaluation of Mr. Knight. There is high suspicion of obstructive sleep apnea with slight possibility of central sleep apnea due to his cardiac comorbidities. We will first screen with a home sleep apnea test and move on to treatment as soon as possible afterwards. [FreeTextEntry3] : Jessica Coppola

## 2022-09-22 NOTE — HISTORY OF PRESENT ILLNESS
[Never] : never [Awakes Unrefreshed] : awakes unrefreshed [Awakes with Dry Mouth] : awakes with dry mouth [Daytime Somnolence] : daytime somnolence [Difficulty Maintaining Sleep] : difficulty maintaining sleep [Nonrestorative Sleep] : nonrestorative sleep [Tired while Driving] : tired while driving [Unintentional Sleep while Active] : unintentional sleep while active [Witnessed Apneas] : witnessed apneas [TextBox_4] : 56 yo M w/ recently diagnosed CAD (MI in 7/2022 followed by CABG x4 (LIMA to LAD, SVG to OM, SVG to Diag)) with course complicated by a right pseudoaneurysm, referred by CT surgery for TAYLOR evaluation. He has no non-sleep related complaints including chest pain, dyspnea, palpitations, etc. and feels generally well. \par He is sleepy during the daytime with an ESS of 16. He also wakes up sometimes in the middle of the night but is unsure what this is due to (sometimes accompanied by urinary urgency but does not know whether this is why he wakes up). Wife at bedside states that he chronically snores and has moments of cessation in his breathing. He also tosses and turns in the middle of the night. She has also seen him gasp for air in the middle of the night. He has lost about 30lbs since his surgery and this has made these symptoms better though. [Cataplexy] : denies cataplexy [Dysesthesias] : denies dysesthesias [Frequent Nocturnal Awakening] : denies frequent nocturnal awakening [Hypersomnolence] : denies hypersomnolence [Hypnopompic Hallucinations] : denies hypnopompic hallucinations [Recent  Weight Gain] : no recent weight gain [Sleep Paralysis] : no history of sleep paralysis [Unusual Movements] : no unusual movements [Unusual Sleep Behavior] : no unusual sleep behavior

## 2022-09-22 NOTE — PHYSICAL EXAM
[No Acute Distress] : no acute distress [Normal Oropharynx] : normal oropharynx [Elongated Uvula] : elongated uvula [Erythema] : erythema [III] : Mallampati Class: III [3+] : Right Tonsil: 3+ [Normal Appearance] : normal appearance [No Neck Mass] : no neck mass [Normal Rate/Rhythm] : normal rate/rhythm [Normal S1, S2] : normal s1, s2 [No Murmurs] : no murmurs [No Resp Distress] : no resp distress [Clear to Auscultation Bilaterally] : clear to auscultation bilaterally [No Abnormalities] : no abnormalities [Benign] : benign [Normal Gait] : normal gait [No Clubbing] : no clubbing [No Cyanosis] : no cyanosis [No Edema] : no edema [FROM] : FROM [Normal Color/ Pigmentation] : normal color/ pigmentation [No Focal Deficits] : no focal deficits [Oriented x3] : oriented x3 [Normal Affect] : normal affect

## 2022-10-27 ENCOUNTER — NON-APPOINTMENT (OUTPATIENT)
Age: 57
End: 2022-10-27

## 2022-10-27 DIAGNOSIS — K21.9 GASTRO-ESOPHAGEAL REFLUX DISEASE W/OUT ESOPHAGITIS: ICD-10-CM

## 2022-10-27 RX ORDER — PANTOPRAZOLE 40 MG/1
40 TABLET, DELAYED RELEASE ORAL DAILY
Qty: 30 | Refills: 1 | Status: ACTIVE | COMMUNITY
Start: 2022-10-27 | End: 1900-01-01

## 2022-11-01 PROBLEM — Z91.89 AT RISK FOR SLEEP APNEA: Status: RESOLVED | Noted: 2022-09-22 | Resolved: 2022-11-01

## 2022-11-01 PROBLEM — G47.33 OBSTRUCTIVE SLEEP APNEA, ADULT: Status: ACTIVE | Noted: 2022-11-01

## 2022-11-15 ENCOUNTER — NON-APPOINTMENT (OUTPATIENT)
Age: 57
End: 2022-11-15

## 2022-11-15 ENCOUNTER — APPOINTMENT (OUTPATIENT)
Dept: HEART AND VASCULAR | Facility: CLINIC | Age: 57
End: 2022-11-15

## 2022-11-15 VITALS
TEMPERATURE: 98.7 F | HEIGHT: 74 IN | WEIGHT: 235 LBS | DIASTOLIC BLOOD PRESSURE: 85 MMHG | SYSTOLIC BLOOD PRESSURE: 135 MMHG | BODY MASS INDEX: 30.16 KG/M2

## 2022-11-15 PROCEDURE — 93000 ELECTROCARDIOGRAM COMPLETE: CPT

## 2022-11-15 PROCEDURE — 99214 OFFICE O/P EST MOD 30 MIN: CPT | Mod: 25

## 2022-11-15 RX ORDER — METOPROLOL SUCCINATE 50 MG/1
50 TABLET, EXTENDED RELEASE ORAL DAILY
Qty: 90 | Refills: 3 | Status: ACTIVE | COMMUNITY
Start: 2022-11-15 | End: 1900-01-01

## 2022-11-15 RX ORDER — METOPROLOL TARTRATE 100 MG/1
100 TABLET, FILM COATED ORAL
Qty: 180 | Refills: 3 | Status: DISCONTINUED | COMMUNITY
Start: 2022-08-02 | End: 2022-11-15

## 2022-11-16 NOTE — REVIEW OF SYSTEMS
[Depression] : depression [Negative] : Heme/Lymph [SOB] : no shortness of breath [Dyspnea on exertion] : not dyspnea during exertion [Chest Discomfort] : no chest discomfort [Lower Ext Edema] : no extremity edema [Leg Claudication] : no intermittent leg claudication [Palpitations] : no palpitations [Orthopnea] : no orthopnea [PND] : no PND [Syncope] : no syncope

## 2022-11-16 NOTE — PHYSICAL EXAM
[Well Developed] : well developed [Well Nourished] : well nourished [No Acute Distress] : no acute distress [Normal Conjunctiva] : normal conjunctiva [Normal Venous Pressure] : normal venous pressure [No Carotid Bruit] : no carotid bruit [Normal S1, S2] : normal S1, S2 [No Murmur] : no murmur [No Rub] : no rub [No Gallop] : no gallop [Clear Lung Fields] : clear lung fields [Good Air Entry] : good air entry [No Respiratory Distress] : no respiratory distress  [Soft] : abdomen soft [Non Tender] : non-tender [No Masses/organomegaly] : no masses/organomegaly [Normal Bowel Sounds] : normal bowel sounds [Normal Gait] : normal gait [No Edema] : no edema [No Cyanosis] : no cyanosis [No Clubbing] : no clubbing [No Varicosities] : no varicosities [Normal Radial B/L] : normal radial B/L [Normal PT B/L] : normal PT B/L [No Rash] : no rash [No Skin Lesions] : no skin lesions [Moves all extremities] : moves all extremities [No Focal Deficits] : no focal deficits [Normal Speech] : normal speech [Alert and Oriented] : alert and oriented [Normal memory] : normal memory [de-identified] : Well healed Rt CFA access site

## 2022-11-16 NOTE — DISCUSSION/SUMMARY
[FreeTextEntry1] : 58 y/o M with PMHx of CAD s/p CABGx4, Rt CFA PSA repair (c/b pericardial effusion) 7/25/2022, HTN, HLD here for follow up post surgery / hospitalization\par \par # CAD\par Revascularized CABGx4\par Doing well post op, feels better every day\par Continue ASA, Statin, BB\par \par # ICM\par EF 46%\par Continue BB\par Continue Lisinopril 5mg daily\par \par # HLD\par Lipitor 40mg daily\par \par # HTN\par BPs at goal\par Conitnue current regimen [EKG obtained to assist in diagnosis and management of assessed problem(s)] : EKG obtained to assist in diagnosis and management of assessed problem(s)

## 2022-11-16 NOTE — HISTORY OF PRESENT ILLNESS
[FreeTextEntry1] : 56 y/o M with PMHx of CAD s/p CABGx4, Rt CFA PSA repair (c/b pericardial effusion) 7/25/2022, HTN, HLD here for follow up post surgery / hospitalization\par \par EKG 8/16/2022: NSR HR 67, Non-specific ST-T changes\par EKG 11/15/2022: NSR, HR 58, IVCD\par \par Cath 7/19/2022: 3V CAD (90% prox LAD, 80% prox D1, 90% prox LCx, 100% OM, 80% RPDA)\par s/p CABG x4 (LIMA to LAD, SVGs to D1, OM and RPDA)\par \par TTE 8/1/2022: EF 45%, no effusion \par TTE 11/15/2022: EF 46%. inferior akinesis, mild MR\par \par Patient reports that he has been doing well. Denies chest pain, SOB\par Has resumed exercise and has good exercise tolerance

## 2023-01-27 ENCOUNTER — NON-APPOINTMENT (OUTPATIENT)
Age: 58
End: 2023-01-27

## 2023-05-16 ENCOUNTER — NON-APPOINTMENT (OUTPATIENT)
Age: 58
End: 2023-05-16

## 2023-05-16 ENCOUNTER — APPOINTMENT (OUTPATIENT)
Dept: HEART AND VASCULAR | Facility: CLINIC | Age: 58
End: 2023-05-16
Payer: COMMERCIAL

## 2023-05-16 VITALS
OXYGEN SATURATION: 98 % | DIASTOLIC BLOOD PRESSURE: 90 MMHG | WEIGHT: 240 LBS | HEART RATE: 82 BPM | SYSTOLIC BLOOD PRESSURE: 155 MMHG | BODY MASS INDEX: 30.8 KG/M2 | HEIGHT: 74 IN

## 2023-05-16 DIAGNOSIS — E78.5 HYPERLIPIDEMIA, UNSPECIFIED: ICD-10-CM

## 2023-05-16 PROCEDURE — 93000 ELECTROCARDIOGRAM COMPLETE: CPT

## 2023-05-16 PROCEDURE — 99214 OFFICE O/P EST MOD 30 MIN: CPT | Mod: 25

## 2023-05-16 RX ORDER — ROSUVASTATIN CALCIUM 5 MG/1
5 TABLET, FILM COATED ORAL DAILY
Qty: 90 | Refills: 3 | Status: ACTIVE | COMMUNITY
Start: 2023-05-16 | End: 1900-01-01

## 2023-05-16 RX ORDER — LISINOPRIL 5 MG/1
5 TABLET ORAL
Qty: 90 | Refills: 3 | Status: ACTIVE | COMMUNITY
Start: 2022-11-16 | End: 1900-01-01

## 2023-05-16 RX ORDER — ASPIRIN ENTERIC COATED TABLETS 81 MG 81 MG/1
81 TABLET, DELAYED RELEASE ORAL
Qty: 90 | Refills: 3 | Status: ACTIVE | COMMUNITY
Start: 2022-08-02 | End: 1900-01-01

## 2023-05-16 RX ORDER — ATORVASTATIN CALCIUM 40 MG/1
40 TABLET, FILM COATED ORAL
Qty: 90 | Refills: 1 | Status: DISCONTINUED | COMMUNITY
Start: 2022-08-02 | End: 2023-05-16

## 2023-05-17 LAB
CHOLEST SERPL-MCNC: 245 MG/DL
ESTIMATED AVERAGE GLUCOSE: 108 MG/DL
HBA1C MFR BLD HPLC: 5.4 %
HDLC SERPL-MCNC: 56 MG/DL
LDLC SERPL CALC-MCNC: 168 MG/DL
NONHDLC SERPL-MCNC: 189 MG/DL
TRIGL SERPL-MCNC: 106 MG/DL
TSH SERPL-ACNC: 2.89 UIU/ML

## 2023-05-17 NOTE — DISCUSSION/SUMMARY
[FreeTextEntry1] : 57 y/o M with PMHx of CAD s/p CABGx4, Rt CFA PSA repair (c/b pericardial effusion) 7/25/2022, HTN, HLD here for follow up post surgery / hospitalization\par \par # CAD\par Revascularized CABGx4\par Doing well post op, feels better every day\par Counseled regarding Continuing ASA, will change from Lipitor to Crestor 5mg daily\par \par # ICM\par EF 46%\par Continue BB\par Continue Lisinopril 5mg daily\par \par # HLD\par Check lipid panel\par Will change from Lipitor to Crestor 5mg daily\par \par # HTN\par BPs at goal\par Continue Lisinopril [EKG obtained to assist in diagnosis and management of assessed problem(s)] : EKG obtained to assist in diagnosis and management of assessed problem(s)

## 2023-05-17 NOTE — HISTORY OF PRESENT ILLNESS
[FreeTextEntry1] : 59 y/o M with PMHx of CAD s/p CABGx4 (c/b pericardial effusion) 7/25/2022, Rt CFA PSA repair , HTN, HLD here for follow up post surgery / hospitalization\par \par EKG 8/16/2022: NSR HR 67, Non-specific ST-T changes\par EKG 11/15/2022: NSR, HR 58, IVCD\par EKG 5/16/2023: NSR, HR 73, IVCD\par \par Cath 7/19/2022: 3V CAD (90% prox LAD, 80% prox D1, 90% prox LCx, 100% OM, 80% RPDA)\par s/p CABG x4 (LIMA to LAD, SVGs to D1, OM and RPDA)\par \par TTE 8/1/2022: EF 45%, no effusion \par TTE 11/15/2022: EF 46%. inferior akinesis, mild MR\par \par Patient reports that he has been doing well. Denies chest pain, SOB\par Has resumed exercise and has good exercise tolerance \par \par Recent trip to [facility], was feeling lightheaded, stopped taking BB, Statin and Aspirin

## 2024-06-18 ENCOUNTER — NON-APPOINTMENT (OUTPATIENT)
Age: 59
End: 2024-06-18

## 2024-08-06 ENCOUNTER — APPOINTMENT (OUTPATIENT)
Dept: HEART AND VASCULAR | Facility: CLINIC | Age: 59
End: 2024-08-06

## 2024-08-06 ENCOUNTER — NON-APPOINTMENT (OUTPATIENT)
Age: 59
End: 2024-08-06

## 2024-08-06 PROCEDURE — 99214 OFFICE O/P EST MOD 30 MIN: CPT | Mod: 25

## 2024-08-06 PROCEDURE — 93000 ELECTROCARDIOGRAM COMPLETE: CPT

## 2024-08-12 NOTE — HISTORY OF PRESENT ILLNESS
[FreeTextEntry1] : 60 y/o M with PMHx of CAD s/p CABGx4 (c/b pericardial effusion) 7/25/2022, Rt CFA PSA repair , HTN, HLD here for follow up post surgery / hospitalization  EKG 8/16/2022: NSR HR 67, Non-specific ST-T changes EKG 11/15/2022: NSR, HR 58, IVCD EKG 5/16/2023: NSR, HR 73, IVCD EKG 8/6/2024: NSR, HR 75, VICD  Cath 7/19/2022: 3V CAD (90% prox LAD, 80% prox D1, 90% prox LCx, 100% OM, 80% RPDA) s/p CABG x4 (LIMA to LAD, SVGs to D1, OM and RPDA)  TTE 8/1/2022: EF 45%, no effusion  TTE 11/15/2022: EF 46%. inferior akinesis, mild MR  Patient reports that he has been doing well. Denies chest pain, SOB Has resumed exercise and has good exercise tolerance

## 2024-08-12 NOTE — DISCUSSION/SUMMARY
[FreeTextEntry1] : 60 y/o M with PMHx of CAD s/p CABGx4, Rt CFA PSA repair (c/b pericardial effusion) 7/25/2022, HTN, HLD here for follow up post surgery / hospitalization  # CAD Revascularized CABGx4 Doing well post op, feels better every day Counseled regarding Continuing ASA Crestor 5mg daily  # ICM EF 46% Continue BB Continue Lisinopril 5mg daily Repeat Echo next visit  # HLD Crestor 5mg daily  # HTN BPs above goal, will monitor further at home Continue Lisinopril. [EKG obtained to assist in diagnosis and management of assessed problem(s)] : EKG obtained to assist in diagnosis and management of assessed problem(s)

## 2025-07-01 ENCOUNTER — APPOINTMENT (OUTPATIENT)
Dept: HEART AND VASCULAR | Facility: CLINIC | Age: 60
End: 2025-07-01

## (undated) DEVICE — DRAPE IOBAN 33" X 23"

## (undated) DEVICE — FOLEY TRAY 16FR 5CC LF LUBRISIL ADVANCE TEMP CLOSED

## (undated) DEVICE — CATH NG SALEM SUMP 16FR

## (undated) DEVICE — SUT PROLENE 6-0 30" C-1

## (undated) DEVICE — GOWN LG

## (undated) DEVICE — SENSOR TERUMO SENSMART 8204CA ADULT/PED

## (undated) DEVICE — SUT VICRYL 3-0 27" MH

## (undated) DEVICE — ELCTR BOVIE TIP BLADE VALLEYLAB 6.5"

## (undated) DEVICE — PACK SCHEINERMAN CABAG

## (undated) DEVICE — DRSG BIOPATCH DISK W CHG 1" W 4.0MM HOLE

## (undated) DEVICE — SUT SILK 2-0 30" TIES

## (undated) DEVICE — CHEST DRAIN OASIS DRY SUCTION WATER SEAL

## (undated) DEVICE — SUT VICRYL 4-0 18" PS-2 UNDYED

## (undated) DEVICE — SUT PLEDGET SOFT LARGE 3/8" X 3/16" X 1/16" X6

## (undated) DEVICE — SUT VICRYL 3-0 27" SH

## (undated) DEVICE — BLADE SCALPEL SAFETY #15 WITH PLASTIC GREEN HANDLE

## (undated) DEVICE — ELCTR STRYKER NEPTUNE SMOKE EVACUATION PENCIL (GREEN)

## (undated) DEVICE — SUT PROLENE BV 130-5 8-0

## (undated) DEVICE — CATH IV SAFE BC 24G X 0.75" (YELLOW)

## (undated) DEVICE — ELCTR BOVIE TIP BLADE INSULATED 6.5" EDGE

## (undated) DEVICE — BLADE STERN SYS 6 305X1MM

## (undated) DEVICE — SUT SILK 3-0 30" BB

## (undated) DEVICE — ELCTR BOVIE TIP BLADE MEGADYNE E-Z CLEAN 4" EXTENDED

## (undated) DEVICE — POSITIONER FOAM EGG CRATE ULNAR 2PCS (PINK)

## (undated) DEVICE — DRSG MEPILEX 10 X 25CM (4 X 10") AG

## (undated) DEVICE — SOL ANTI FOG

## (undated) DEVICE — DRSG ACE BANDAGE 6"

## (undated) DEVICE — SUT PROLENE 2-0 36" MH

## (undated) DEVICE — CONNECTOR CARDIAC 1:1 FOR HUBLESS DRAINS

## (undated) DEVICE — SUCTION CATH AIRLIFE CONTROL VALVE TRIFLO 14FR

## (undated) DEVICE — DRSG KIT CVC TEGADERM ADVANCED

## (undated) DEVICE — PACING CABLE (BROWN) A/V TEMP SCREW DOWN 12FT

## (undated) DEVICE — MULTIPLE PERFUSION SET FEMALE 1 INLET LEG W 4 LEGS / VENT 15" (RED/RED)

## (undated) DEVICE — DRSG STOCKINETTE IMPERVIOUS XL

## (undated) DEVICE — GOWN SLEEVES

## (undated) DEVICE — Device

## (undated) DEVICE — ELCTR BOVIE TIP BLADE INSULATED 3" EDGE

## (undated) DEVICE — GAUZE SPONGE 12PLY DMT MT 8X4

## (undated) DEVICE — SUT SILK 2-0 18" SH (POP-OFF)

## (undated) DEVICE — PACK SCHEINERMAN OPEN HEART A

## (undated) DEVICE — STOPCOCK 3 WAY

## (undated) DEVICE — DRSG ACE BANDAGE 6" LF STERILE

## (undated) DEVICE — SUT SILK 4-0 12-30" TIES

## (undated) DEVICE — SYR ASEPTO

## (undated) DEVICE — BLADE SCALPEL SAFETY #10 WITH PLASTIC GREEN HANDLE

## (undated) DEVICE — TUBING STRYKER PNEUMOCLEAR HIGH FLOW

## (undated) DEVICE — SUT VICRYL 1 27" CT-1

## (undated) DEVICE — DRSG ALLEVYN LIFE 6 X 6 (PINK)

## (undated) DEVICE — SUT PROLENE 3-0 36" SH

## (undated) DEVICE — SUT STAINLESS STEEL 6 4-18" CCS

## (undated) DEVICE — GETINGE VASOVIEW 7 ENDOSCOPIC VESSEL HARVESTING SYSTEM

## (undated) DEVICE — SUMP INTRACARDIAC/PERICARDIAL 20FR 1/4" ADULT

## (undated) DEVICE — CATH TRIOX OXIMETRY 8F 3 LUMENS

## (undated) DEVICE — SPECIMEN CONTAINER 100ML

## (undated) DEVICE — DRSG TRACH DRAINAGE 4X4

## (undated) DEVICE — DRSG TEGADERM 4X4.75"

## (undated) DEVICE — SYS BLOOD/FLUID WARMING W/ NEEDLELESS ASP PORT

## (undated) DEVICE — DRAPE PROBE COVER 5" X 96"

## (undated) DEVICE — DRAPE SLUSH / WARMER 44 X 66"

## (undated) DEVICE — TUBING BRAT 2 SUCTION ASSEMBLY TWIST LOCK

## (undated) DEVICE — FOLEY TRAY 16FR 5CC LTX URINE METER TEMP SENSING CLOSED

## (undated) DEVICE — BLADE SCALPEL SAFETY #11 WITH PLASTIC GREEN HANDLE

## (undated) DEVICE — CONTAINER SPECIMEN URINE 120ML

## (undated) DEVICE — URETERAL CATH RED RUBBER 16FR (ORANGE)

## (undated) DEVICE — SUT PERMAHAND 1 10-30"

## (undated) DEVICE — STEALTH CLAMP INSERT FIBRA/FIBRA 90MM

## (undated) DEVICE — PREP SCRUB BRUSH W CHG 4%

## (undated) DEVICE — PACK PROC CV DRAPE

## (undated) DEVICE — BLOWER MISTER AXIUS WITH IV SET

## (undated) DEVICE — TUBING TRUWAVE PRESSURE MALE/FEMALE 72"

## (undated) DEVICE — DRAIN RESERVOIR FOR JACKSON PRATT 100CC CARDINAL

## (undated) DEVICE — DRAIN JACKSON PRATT 10MM FLAT 3/4 NO TROCAR

## (undated) DEVICE — PREP CHLORAPREP HI-LITE ORANGE 26ML

## (undated) DEVICE — SUT PROLENE 4-0 36" BB

## (undated) DEVICE — STAPLER SKIN MULTI DIRECTION W35

## (undated) DEVICE — DRAPE LIGHT HANDLE COVER (GREEN)

## (undated) DEVICE — DRAPE TOWEL BLUE 17" X 24"

## (undated) DEVICE — CATH CV TRAY INSR ST UNIV

## (undated) DEVICE — GEL AQUSNC PACKET 20GR

## (undated) DEVICE — PACING CABLE (BLUE) ATRIAL TEMP SCREW DOWN 12FT

## (undated) DEVICE — DRAPE OR CAMERA COVER

## (undated) DEVICE — TUBING SUCTION NONCONDUCTIVE 6MM X 12FT

## (undated) DEVICE — DRSG DERMABOND 0.7ML

## (undated) DEVICE — CONNECTOR STRAIGHT 1/2 X 1/2"

## (undated) DEVICE — ELCTR BOVIE PENCIL HANDPIECE ROCKER SWITCH 15FT

## (undated) DEVICE — SUT SILK 5-0 60" TIES

## (undated) DEVICE — PUNCH VASC STD 7.75" HANDLE 4MM DISP

## (undated) DEVICE — SUT SILK 1 30" MH

## (undated) DEVICE — GLV 7.5 PROTEXIS (WHITE)

## (undated) DEVICE — DRSG STOCKINETTE IMPERVIOUS MED

## (undated) DEVICE — SUT PROLENE 7-0 30" BV-1